# Patient Record
Sex: FEMALE | Race: WHITE | Employment: FULL TIME | URBAN - METROPOLITAN AREA
[De-identification: names, ages, dates, MRNs, and addresses within clinical notes are randomized per-mention and may not be internally consistent; named-entity substitution may affect disease eponyms.]

---

## 2019-12-24 ENCOUNTER — HOSPITAL ENCOUNTER (OUTPATIENT)
Dept: PREADMISSION TESTING | Age: 54
Discharge: HOME | DRG: 470 | End: 2019-12-28
Payer: COMMERCIAL

## 2019-12-24 VITALS
TEMPERATURE: 98.1 F | WEIGHT: 258.6 LBS | RESPIRATION RATE: 16 BRPM | BODY MASS INDEX: 44.15 KG/M2 | SYSTOLIC BLOOD PRESSURE: 161 MMHG | OXYGEN SATURATION: 98 % | HEART RATE: 68 BPM | HEIGHT: 64 IN | DIASTOLIC BLOOD PRESSURE: 69 MMHG

## 2019-12-24 LAB
ABO/RH: NORMAL
ANTIBODY SCREEN: NORMAL

## 2019-12-24 PROCEDURE — 86850 RBC ANTIBODY SCREEN: CPT

## 2019-12-24 PROCEDURE — 86901 BLOOD TYPING SEROLOGIC RH(D): CPT

## 2019-12-24 PROCEDURE — 82985 ASSAY OF GLYCATED PROTEIN: CPT

## 2019-12-24 PROCEDURE — 86900 BLOOD TYPING SEROLOGIC ABO: CPT

## 2019-12-24 RX ORDER — SODIUM CHLORIDE 0.9 % (FLUSH) 0.9 %
10 SYRINGE (ML) INJECTION PRN
Status: CANCELLED | OUTPATIENT
Start: 2019-12-30

## 2019-12-24 RX ORDER — CARVEDILOL 6.25 MG/1
6.25 TABLET ORAL 2 TIMES DAILY
COMMUNITY
Start: 2018-02-01

## 2019-12-24 RX ORDER — LISINOPRIL 5 MG/1
2.5 TABLET ORAL DAILY
COMMUNITY
Start: 2018-02-01

## 2019-12-24 RX ORDER — LIDOCAINE HYDROCHLORIDE 10 MG/ML
1 INJECTION, SOLUTION EPIDURAL; INFILTRATION; INTRACAUDAL; PERINEURAL
Status: CANCELLED | OUTPATIENT
Start: 2019-12-30 | End: 2019-12-30

## 2019-12-24 RX ORDER — SPIRONOLACTONE 25 MG/1
25 TABLET ORAL DAILY
COMMUNITY

## 2019-12-24 RX ORDER — VITAMIN E (DL,TOCOPHERYL ACET) 180 MG
500 CAPSULE ORAL DAILY
COMMUNITY

## 2019-12-24 RX ORDER — SERTRALINE HYDROCHLORIDE 100 MG/1
100 TABLET, FILM COATED ORAL DAILY
COMMUNITY

## 2019-12-24 RX ORDER — FUROSEMIDE 40 MG/1
40 TABLET ORAL DAILY
COMMUNITY
Start: 2018-07-22

## 2019-12-24 RX ORDER — SODIUM CHLORIDE, SODIUM LACTATE, POTASSIUM CHLORIDE, CALCIUM CHLORIDE 600; 310; 30; 20 MG/100ML; MG/100ML; MG/100ML; MG/100ML
INJECTION, SOLUTION INTRAVENOUS CONTINUOUS
Status: CANCELLED | OUTPATIENT
Start: 2019-12-30

## 2019-12-24 RX ORDER — SODIUM CHLORIDE 0.9 % (FLUSH) 0.9 %
10 SYRINGE (ML) INJECTION EVERY 12 HOURS SCHEDULED
Status: CANCELLED | OUTPATIENT
Start: 2019-12-30

## 2019-12-24 RX ORDER — POTASSIUM CHLORIDE 20 MEQ/1
40 TABLET, EXTENDED RELEASE ORAL DAILY
COMMUNITY

## 2019-12-27 LAB — FRUCTOSAMINE: 219 UMOL/L (ref 170–285)

## 2019-12-30 ENCOUNTER — HOSPITAL ENCOUNTER (OUTPATIENT)
Dept: GENERAL RADIOLOGY | Age: 54
Setting detail: SURGERY ADMIT
Discharge: HOME | DRG: 470 | End: 2020-01-01
Attending: ORTHOPAEDIC SURGERY
Payer: COMMERCIAL

## 2019-12-30 ENCOUNTER — ANESTHESIA (OUTPATIENT)
Dept: OPERATING ROOM | Age: 54
DRG: 470 | End: 2019-12-30
Payer: COMMERCIAL

## 2019-12-30 ENCOUNTER — APPOINTMENT (OUTPATIENT)
Dept: GENERAL RADIOLOGY | Age: 54
DRG: 470 | End: 2019-12-30
Attending: ORTHOPAEDIC SURGERY
Payer: COMMERCIAL

## 2019-12-30 ENCOUNTER — ANESTHESIA EVENT (OUTPATIENT)
Dept: OPERATING ROOM | Age: 54
DRG: 470 | End: 2019-12-30
Payer: COMMERCIAL

## 2019-12-30 ENCOUNTER — HOSPITAL ENCOUNTER (INPATIENT)
Age: 54
LOS: 2 days | Discharge: HOME HEALTH | DRG: 470 | End: 2020-01-01
Attending: ORTHOPAEDIC SURGERY | Admitting: ORTHOPAEDIC SURGERY
Payer: COMMERCIAL

## 2019-12-30 VITALS
OXYGEN SATURATION: 99 % | DIASTOLIC BLOOD PRESSURE: 50 MMHG | TEMPERATURE: 98.6 F | SYSTOLIC BLOOD PRESSURE: 83 MMHG | RESPIRATION RATE: 15 BRPM

## 2019-12-30 PROBLEM — M16.12 OSTEOARTHRITIS OF LEFT HIP: Status: ACTIVE | Noted: 2019-12-30

## 2019-12-30 PROCEDURE — 3700000001 HC ADD 15 MINUTES (ANESTHESIA): Performed by: ORTHOPAEDIC SURGERY

## 2019-12-30 PROCEDURE — 6360000002 HC RX W HCPCS: Performed by: ANESTHESIOLOGY

## 2019-12-30 PROCEDURE — C1776 JOINT DEVICE (IMPLANTABLE): HCPCS | Performed by: ORTHOPAEDIC SURGERY

## 2019-12-30 PROCEDURE — 2580000003 HC RX 258: Performed by: ORTHOPAEDIC SURGERY

## 2019-12-30 PROCEDURE — 7100000000 HC PACU RECOVERY - FIRST 15 MIN: Performed by: ORTHOPAEDIC SURGERY

## 2019-12-30 PROCEDURE — 3700000000 HC ANESTHESIA ATTENDED CARE: Performed by: ORTHOPAEDIC SURGERY

## 2019-12-30 PROCEDURE — 64450 NJX AA&/STRD OTHER PN/BRANCH: CPT | Performed by: ANESTHESIOLOGY

## 2019-12-30 PROCEDURE — 0SRB0JZ REPLACEMENT OF LEFT HIP JOINT WITH SYNTHETIC SUBSTITUTE, OPEN APPROACH: ICD-10-PCS | Performed by: ORTHOPAEDIC SURGERY

## 2019-12-30 PROCEDURE — 6370000000 HC RX 637 (ALT 250 FOR IP): Performed by: ORTHOPAEDIC SURGERY

## 2019-12-30 PROCEDURE — 2709999900 HC NON-CHARGEABLE SUPPLY: Performed by: ORTHOPAEDIC SURGERY

## 2019-12-30 PROCEDURE — 2500000003 HC RX 250 WO HCPCS: Performed by: NURSE ANESTHETIST, CERTIFIED REGISTERED

## 2019-12-30 PROCEDURE — 1210000000 HC MED SURG R&B

## 2019-12-30 PROCEDURE — 6360000002 HC RX W HCPCS: Performed by: ORTHOPAEDIC SURGERY

## 2019-12-30 PROCEDURE — C1729 CATH, DRAINAGE: HCPCS | Performed by: ORTHOPAEDIC SURGERY

## 2019-12-30 PROCEDURE — 7100000001 HC PACU RECOVERY - ADDTL 15 MIN: Performed by: ORTHOPAEDIC SURGERY

## 2019-12-30 PROCEDURE — 3600000014 HC SURGERY LEVEL 4 ADDTL 15MIN: Performed by: ORTHOPAEDIC SURGERY

## 2019-12-30 PROCEDURE — 3600000004 HC SURGERY LEVEL 4 BASE: Performed by: ORTHOPAEDIC SURGERY

## 2019-12-30 PROCEDURE — 73502 X-RAY EXAM HIP UNI 2-3 VIEWS: CPT

## 2019-12-30 PROCEDURE — 6360000002 HC RX W HCPCS: Performed by: NURSE ANESTHETIST, CERTIFIED REGISTERED

## 2019-12-30 PROCEDURE — 2580000003 HC RX 258: Performed by: NURSE ANESTHETIST, CERTIFIED REGISTERED

## 2019-12-30 PROCEDURE — 72170 X-RAY EXAM OF PELVIS: CPT

## 2019-12-30 PROCEDURE — 2500000003 HC RX 250 WO HCPCS: Performed by: NURSE PRACTITIONER

## 2019-12-30 DEVICE — CUP ACET DIA52MM HIP GRIPTION PRI CEMENTLESS FIX SECT SER: Type: IMPLANTABLE DEVICE | Site: HIP | Status: FUNCTIONAL

## 2019-12-30 DEVICE — IMPLANTABLE DEVICE: Type: IMPLANTABLE DEVICE | Site: HIP | Status: FUNCTIONAL

## 2019-12-30 DEVICE — SCREW BNE L25MM DIA6.5MM CANC HIP S STL GRIPTION FULL THRD: Type: IMPLANTABLE DEVICE | Site: HIP | Status: FUNCTIONAL

## 2019-12-30 DEVICE — LINER ACET OD52MM ID36MM HIP ALTRX PINN: Type: IMPLANTABLE DEVICE | Site: HIP | Status: FUNCTIONAL

## 2019-12-30 RX ORDER — POTASSIUM CHLORIDE 20 MEQ/1
40 TABLET, EXTENDED RELEASE ORAL DAILY
Status: DISCONTINUED | OUTPATIENT
Start: 2019-12-30 | End: 2020-01-01 | Stop reason: HOSPADM

## 2019-12-30 RX ORDER — METOCLOPRAMIDE HYDROCHLORIDE 5 MG/ML
10 INJECTION INTRAMUSCULAR; INTRAVENOUS
Status: DISCONTINUED | OUTPATIENT
Start: 2019-12-30 | End: 2019-12-30 | Stop reason: HOSPADM

## 2019-12-30 RX ORDER — ONDANSETRON 2 MG/ML
4 INJECTION INTRAMUSCULAR; INTRAVENOUS EVERY 6 HOURS PRN
Status: DISCONTINUED | OUTPATIENT
Start: 2019-12-30 | End: 2020-01-01 | Stop reason: HOSPADM

## 2019-12-30 RX ORDER — SENNA AND DOCUSATE SODIUM 50; 8.6 MG/1; MG/1
1 TABLET, FILM COATED ORAL 2 TIMES DAILY
Status: DISCONTINUED | OUTPATIENT
Start: 2019-12-30 | End: 2020-01-01 | Stop reason: HOSPADM

## 2019-12-30 RX ORDER — EPINEPHRINE 1 MG/ML
INJECTION, SOLUTION, CONCENTRATE INTRAVENOUS PRN
Status: DISCONTINUED | OUTPATIENT
Start: 2019-12-30 | End: 2019-12-30 | Stop reason: SDUPTHER

## 2019-12-30 RX ORDER — SODIUM CHLORIDE 9 MG/ML
INJECTION, SOLUTION INTRAVENOUS CONTINUOUS
Status: DISCONTINUED | OUTPATIENT
Start: 2019-12-30 | End: 2020-01-01 | Stop reason: HOSPADM

## 2019-12-30 RX ORDER — ROPIVACAINE HYDROCHLORIDE 5 MG/ML
INJECTION, SOLUTION EPIDURAL; INFILTRATION; PERINEURAL PRN
Status: DISCONTINUED | OUTPATIENT
Start: 2019-12-30 | End: 2019-12-30 | Stop reason: SDUPTHER

## 2019-12-30 RX ORDER — SODIUM CHLORIDE 0.9 % (FLUSH) 0.9 %
10 SYRINGE (ML) INJECTION EVERY 12 HOURS SCHEDULED
Status: DISCONTINUED | OUTPATIENT
Start: 2019-12-30 | End: 2019-12-30 | Stop reason: HOSPADM

## 2019-12-30 RX ORDER — SODIUM CHLORIDE, SODIUM LACTATE, POTASSIUM CHLORIDE, CALCIUM CHLORIDE 600; 310; 30; 20 MG/100ML; MG/100ML; MG/100ML; MG/100ML
INJECTION, SOLUTION INTRAVENOUS CONTINUOUS
Status: DISCONTINUED | OUTPATIENT
Start: 2019-12-30 | End: 2019-12-30

## 2019-12-30 RX ORDER — SODIUM CHLORIDE, SODIUM LACTATE, POTASSIUM CHLORIDE, CALCIUM CHLORIDE 600; 310; 30; 20 MG/100ML; MG/100ML; MG/100ML; MG/100ML
INJECTION, SOLUTION INTRAVENOUS CONTINUOUS PRN
Status: DISCONTINUED | OUTPATIENT
Start: 2019-12-30 | End: 2019-12-30 | Stop reason: SDUPTHER

## 2019-12-30 RX ORDER — SODIUM CHLORIDE 0.9 % (FLUSH) 0.9 %
10 SYRINGE (ML) INJECTION PRN
Status: DISCONTINUED | OUTPATIENT
Start: 2019-12-30 | End: 2019-12-30 | Stop reason: HOSPADM

## 2019-12-30 RX ORDER — FENTANYL CITRATE 50 UG/ML
50 INJECTION, SOLUTION INTRAMUSCULAR; INTRAVENOUS EVERY 10 MIN PRN
Status: DISCONTINUED | OUTPATIENT
Start: 2019-12-30 | End: 2019-12-30 | Stop reason: HOSPADM

## 2019-12-30 RX ORDER — TRANEXAMIC ACID 650 1/1
1950 TABLET ORAL ONCE
Status: COMPLETED | OUTPATIENT
Start: 2019-12-30 | End: 2019-12-30

## 2019-12-30 RX ORDER — ACETAMINOPHEN 500 MG
1000 TABLET ORAL EVERY 8 HOURS SCHEDULED
Status: DISCONTINUED | OUTPATIENT
Start: 2019-12-30 | End: 2020-01-01 | Stop reason: HOSPADM

## 2019-12-30 RX ORDER — SODIUM CHLORIDE 0.9 % (FLUSH) 0.9 %
10 SYRINGE (ML) INJECTION EVERY 12 HOURS SCHEDULED
Status: DISCONTINUED | OUTPATIENT
Start: 2019-12-30 | End: 2020-01-01 | Stop reason: HOSPADM

## 2019-12-30 RX ORDER — SERTRALINE HYDROCHLORIDE 100 MG/1
100 TABLET, FILM COATED ORAL DAILY
Status: DISCONTINUED | OUTPATIENT
Start: 2019-12-30 | End: 2020-01-01 | Stop reason: HOSPADM

## 2019-12-30 RX ORDER — CEFAZOLIN SODIUM 2 G/50ML
2 SOLUTION INTRAVENOUS EVERY 8 HOURS
Status: DISCONTINUED | OUTPATIENT
Start: 2019-12-30 | End: 2019-12-30

## 2019-12-30 RX ORDER — LIDOCAINE HYDROCHLORIDE 20 MG/ML
INJECTION, SOLUTION INTRAVENOUS PRN
Status: DISCONTINUED | OUTPATIENT
Start: 2019-12-30 | End: 2019-12-30 | Stop reason: SDUPTHER

## 2019-12-30 RX ORDER — MIDAZOLAM HYDROCHLORIDE 1 MG/ML
INJECTION INTRAMUSCULAR; INTRAVENOUS PRN
Status: DISCONTINUED | OUTPATIENT
Start: 2019-12-30 | End: 2019-12-30 | Stop reason: SDUPTHER

## 2019-12-30 RX ORDER — BUPIVACAINE HYDROCHLORIDE 7.5 MG/ML
INJECTION, SOLUTION INTRASPINAL PRN
Status: DISCONTINUED | OUTPATIENT
Start: 2019-12-30 | End: 2019-12-30 | Stop reason: SDUPTHER

## 2019-12-30 RX ORDER — DIPHENHYDRAMINE HYDROCHLORIDE 50 MG/ML
12.5 INJECTION INTRAMUSCULAR; INTRAVENOUS
Status: DISCONTINUED | OUTPATIENT
Start: 2019-12-30 | End: 2019-12-30 | Stop reason: HOSPADM

## 2019-12-30 RX ORDER — SODIUM CHLORIDE 0.9 % (FLUSH) 0.9 %
10 SYRINGE (ML) INJECTION PRN
Status: DISCONTINUED | OUTPATIENT
Start: 2019-12-30 | End: 2020-01-01 | Stop reason: HOSPADM

## 2019-12-30 RX ORDER — MAGNESIUM HYDROXIDE 1200 MG/15ML
LIQUID ORAL CONTINUOUS PRN
Status: COMPLETED | OUTPATIENT
Start: 2019-12-30 | End: 2019-12-30

## 2019-12-30 RX ORDER — HYDROCODONE BITARTRATE AND ACETAMINOPHEN 5; 325 MG/1; MG/1
1 TABLET ORAL EVERY 6 HOURS PRN
Status: DISCONTINUED | OUTPATIENT
Start: 2019-12-30 | End: 2020-01-01 | Stop reason: HOSPADM

## 2019-12-30 RX ORDER — TRAMADOL HYDROCHLORIDE 50 MG/1
50 TABLET ORAL EVERY 6 HOURS SCHEDULED
Status: DISCONTINUED | OUTPATIENT
Start: 2019-12-30 | End: 2020-01-01 | Stop reason: HOSPADM

## 2019-12-30 RX ORDER — ONDANSETRON 2 MG/ML
4 INJECTION INTRAMUSCULAR; INTRAVENOUS
Status: COMPLETED | OUTPATIENT
Start: 2019-12-30 | End: 2019-12-30

## 2019-12-30 RX ORDER — SPIRONOLACTONE 25 MG/1
25 TABLET ORAL DAILY
Status: DISCONTINUED | OUTPATIENT
Start: 2019-12-30 | End: 2020-01-01 | Stop reason: HOSPADM

## 2019-12-30 RX ORDER — ONDANSETRON 2 MG/ML
INJECTION INTRAMUSCULAR; INTRAVENOUS PRN
Status: DISCONTINUED | OUTPATIENT
Start: 2019-12-30 | End: 2019-12-30 | Stop reason: SDUPTHER

## 2019-12-30 RX ORDER — LIDOCAINE HYDROCHLORIDE 10 MG/ML
1 INJECTION, SOLUTION EPIDURAL; INFILTRATION; INTRACAUDAL; PERINEURAL
Status: COMPLETED | OUTPATIENT
Start: 2019-12-30 | End: 2019-12-30

## 2019-12-30 RX ORDER — LISINOPRIL 5 MG/1
2.5 TABLET ORAL DAILY
Status: DISCONTINUED | OUTPATIENT
Start: 2019-12-30 | End: 2020-01-01 | Stop reason: HOSPADM

## 2019-12-30 RX ORDER — TRANEXAMIC ACID 650 1/1
1300 TABLET ORAL ONCE
Status: DISCONTINUED | OUTPATIENT
Start: 2019-12-30 | End: 2019-12-30

## 2019-12-30 RX ORDER — PROPOFOL 10 MG/ML
INJECTION, EMULSION INTRAVENOUS CONTINUOUS PRN
Status: DISCONTINUED | OUTPATIENT
Start: 2019-12-30 | End: 2019-12-30 | Stop reason: SDUPTHER

## 2019-12-30 RX ORDER — SODIUM CHLORIDE, SODIUM LACTATE, POTASSIUM CHLORIDE, CALCIUM CHLORIDE 600; 310; 30; 20 MG/100ML; MG/100ML; MG/100ML; MG/100ML
INJECTION, SOLUTION INTRAVENOUS ONCE
Status: DISCONTINUED | OUTPATIENT
Start: 2019-12-30 | End: 2020-01-01 | Stop reason: HOSPADM

## 2019-12-30 RX ORDER — PHENYLEPHRINE HYDROCHLORIDE 10 MG/ML
INJECTION INTRAVENOUS PRN
Status: DISCONTINUED | OUTPATIENT
Start: 2019-12-30 | End: 2019-12-30 | Stop reason: SDUPTHER

## 2019-12-30 RX ORDER — DOCUSATE SODIUM 100 MG/1
100 CAPSULE, LIQUID FILLED ORAL 2 TIMES DAILY
Status: DISCONTINUED | OUTPATIENT
Start: 2019-12-30 | End: 2020-01-01 | Stop reason: HOSPADM

## 2019-12-30 RX ORDER — MEPERIDINE HYDROCHLORIDE 25 MG/ML
12.5 INJECTION INTRAMUSCULAR; INTRAVENOUS; SUBCUTANEOUS EVERY 5 MIN PRN
Status: DISCONTINUED | OUTPATIENT
Start: 2019-12-30 | End: 2019-12-30 | Stop reason: HOSPADM

## 2019-12-30 RX ORDER — MAGNESIUM HYDROXIDE 1200 MG/15ML
LIQUID ORAL PRN
Status: DISCONTINUED | OUTPATIENT
Start: 2019-12-30 | End: 2019-12-30 | Stop reason: ALTCHOICE

## 2019-12-30 RX ORDER — ASPIRIN 81 MG/1
81 TABLET ORAL 2 TIMES DAILY
Status: DISCONTINUED | OUTPATIENT
Start: 2019-12-30 | End: 2020-01-01 | Stop reason: HOSPADM

## 2019-12-30 RX ORDER — CARVEDILOL 6.25 MG/1
6.25 TABLET ORAL 2 TIMES DAILY
Status: DISCONTINUED | OUTPATIENT
Start: 2019-12-30 | End: 2020-01-01 | Stop reason: HOSPADM

## 2019-12-30 RX ORDER — FUROSEMIDE 40 MG/1
40 TABLET ORAL DAILY
Status: DISCONTINUED | OUTPATIENT
Start: 2019-12-30 | End: 2020-01-01 | Stop reason: HOSPADM

## 2019-12-30 RX ORDER — LANOLIN ALCOHOL/MO/W.PET/CERES
400 CREAM (GRAM) TOPICAL DAILY
Status: DISCONTINUED | OUTPATIENT
Start: 2019-12-30 | End: 2020-01-01 | Stop reason: HOSPADM

## 2019-12-30 RX ORDER — TRANEXAMIC ACID 650 1/1
1950 TABLET ORAL ONCE
Status: COMPLETED | OUTPATIENT
Start: 2019-12-31 | End: 2019-12-31

## 2019-12-30 RX ADMIN — MIDAZOLAM HYDROCHLORIDE 0.5 MG: 2 INJECTION, SOLUTION INTRAMUSCULAR; INTRAVENOUS at 13:55

## 2019-12-30 RX ADMIN — HYDROCODONE BITARTRATE AND ACETAMINOPHEN 1 TABLET: 5; 325 TABLET ORAL at 23:22

## 2019-12-30 RX ADMIN — BUPIVACAINE HYDROCHLORIDE 12 MG: 7.5 INJECTION, SOLUTION INTRASPINAL at 11:42

## 2019-12-30 RX ADMIN — SODIUM CHLORIDE: 9 INJECTION, SOLUTION INTRAVENOUS at 22:49

## 2019-12-30 RX ADMIN — LIDOCAINE HYDROCHLORIDE 0.1 ML: 10 INJECTION, SOLUTION EPIDURAL; INFILTRATION; INTRACAUDAL; PERINEURAL at 10:11

## 2019-12-30 RX ADMIN — ONDANSETRON 4 MG: 2 INJECTION INTRAMUSCULAR; INTRAVENOUS at 13:59

## 2019-12-30 RX ADMIN — VANCOMYCIN HYDROCHLORIDE 2 G: 1 INJECTION, POWDER, LYOPHILIZED, FOR SOLUTION INTRAVENOUS at 11:50

## 2019-12-30 RX ADMIN — TRAMADOL HYDROCHLORIDE 50 MG: 50 TABLET, FILM COATED ORAL at 20:29

## 2019-12-30 RX ADMIN — TRANEXAMIC ACID 1950 MG: 650 TABLET ORAL at 22:36

## 2019-12-30 RX ADMIN — SODIUM CHLORIDE, POTASSIUM CHLORIDE, SODIUM LACTATE AND CALCIUM CHLORIDE: 600; 310; 30; 20 INJECTION, SOLUTION INTRAVENOUS at 15:02

## 2019-12-30 RX ADMIN — FENTANYL CITRATE 50 MCG: 50 INJECTION, SOLUTION INTRAMUSCULAR; INTRAVENOUS at 16:05

## 2019-12-30 RX ADMIN — TRANEXAMIC ACID 1950 MG: 650 TABLET ORAL at 10:17

## 2019-12-30 RX ADMIN — ASPIRIN 81 MG: 81 TABLET, COATED ORAL at 22:36

## 2019-12-30 RX ADMIN — SODIUM CHLORIDE, POTASSIUM CHLORIDE, SODIUM LACTATE AND CALCIUM CHLORIDE: 600; 310; 30; 20 INJECTION, SOLUTION INTRAVENOUS at 12:22

## 2019-12-30 RX ADMIN — FENTANYL CITRATE 50 MCG: 50 INJECTION, SOLUTION INTRAMUSCULAR; INTRAVENOUS at 16:30

## 2019-12-30 RX ADMIN — LIDOCAINE HYDROCHLORIDE 50 MG: 20 INJECTION, SOLUTION INTRAVENOUS at 11:45

## 2019-12-30 RX ADMIN — MIDAZOLAM HYDROCHLORIDE 1 MG: 2 INJECTION, SOLUTION INTRAMUSCULAR; INTRAVENOUS at 12:18

## 2019-12-30 RX ADMIN — PHENYLEPHRINE HYDROCHLORIDE 100 MCG: 10 INJECTION INTRAVENOUS at 12:29

## 2019-12-30 RX ADMIN — FENTANYL CITRATE 50 MCG: 50 INJECTION, SOLUTION INTRAMUSCULAR; INTRAVENOUS at 16:20

## 2019-12-30 RX ADMIN — MIDAZOLAM HYDROCHLORIDE 0.5 MG: 2 INJECTION, SOLUTION INTRAMUSCULAR; INTRAVENOUS at 13:13

## 2019-12-30 RX ADMIN — MIDAZOLAM HYDROCHLORIDE 4 MG: 2 INJECTION, SOLUTION INTRAMUSCULAR; INTRAVENOUS at 10:55

## 2019-12-30 RX ADMIN — ACETAMINOPHEN 1000 MG: 500 TABLET ORAL at 22:37

## 2019-12-30 RX ADMIN — SERTRALINE 100 MG: 100 TABLET, FILM COATED ORAL at 22:44

## 2019-12-30 RX ADMIN — EPINEPHRINE 100 MCG: 1 INJECTION, SOLUTION INTRAMUSCULAR; SUBCUTANEOUS at 11:00

## 2019-12-30 RX ADMIN — Medication 400 MG: at 22:43

## 2019-12-30 RX ADMIN — ROPIVACAINE HYDROCHLORIDE 20 ML: 5 INJECTION, SOLUTION EPIDURAL; INFILTRATION; PERINEURAL at 11:00

## 2019-12-30 RX ADMIN — SPIRONOLACTONE 25 MG: 25 TABLET, FILM COATED ORAL at 22:51

## 2019-12-30 RX ADMIN — ONDANSETRON 4 MG: 2 INJECTION INTRAMUSCULAR; INTRAVENOUS at 16:10

## 2019-12-30 RX ADMIN — PHENYLEPHRINE HYDROCHLORIDE 100 MCG: 10 INJECTION INTRAVENOUS at 12:13

## 2019-12-30 RX ADMIN — PROPOFOL 100 MCG/KG/MIN: 10 INJECTION, EMULSION INTRAVENOUS at 11:50

## 2019-12-30 ASSESSMENT — PULMONARY FUNCTION TESTS
PIF_VALUE: 0

## 2019-12-30 ASSESSMENT — PAIN DESCRIPTION - LOCATION: LOCATION: HIP

## 2019-12-30 ASSESSMENT — PAIN SCALES - GENERAL
PAINLEVEL_OUTOF10: 6
PAINLEVEL_OUTOF10: 5
PAINLEVEL_OUTOF10: 8
PAINLEVEL_OUTOF10: 7
PAINLEVEL_OUTOF10: 3
PAINLEVEL_OUTOF10: 5
PAINLEVEL_OUTOF10: 6

## 2019-12-30 ASSESSMENT — PAIN DESCRIPTION - ORIENTATION: ORIENTATION: LEFT

## 2019-12-30 ASSESSMENT — PAIN DESCRIPTION - PAIN TYPE: TYPE: SURGICAL PAIN

## 2019-12-30 ASSESSMENT — LIFESTYLE VARIABLES: SMOKING_STATUS: 1

## 2019-12-30 NOTE — FLOWSHEET NOTE
Spoke with Elías Golden RN in Recovery to verify surgery end time, she states it was [email or web address], per pharmacy they need to time TXA correctly.

## 2019-12-30 NOTE — ANESTHESIA PROCEDURE NOTES
Spinal Block    Patient location during procedure: OR  Start time: 12/30/2019 11:39 AM  End time: 12/30/2019 11:43 AM  Reason for block: primary anesthetic  Staffing  Resident/CRNA: RIYA Kee CRNA  Performed: resident/CRNA   Preanesthetic Checklist  Completed: patient identified, site marked, surgical consent, pre-op evaluation, timeout performed, IV checked, risks and benefits discussed, monitors and equipment checked, anesthesia consent given, oxygen available and patient being monitored  Spinal Block  Patient position: sitting  Prep: ChloraPrep, site prepped and draped and x3  Patient monitoring: cardiac monitor, continuous pulse ox, continuous capnometry and frequent blood pressure checks  Approach: midline  Location: L3/L4  Provider prep: mask and sterile gloves  Local infiltration: lidocaine  Agent: bupivacaine  Dose: 1.6  Dose: 1.6  Needle  Needle type: Radha   Needle gauge: 22 G  Needle length: 3.5 in  Kit: benavides lima  Lot number: [id number]  Expiration date: 10/31/2020  Assessment  Sensory level: T6  Swirl obtained: Yes  CSF: clear  Attempts: 2  Hemodynamics: stable  Additional Notes  Attempt x1 with #24 pecan, needle buckled,attempt x1 22 radha, easy placement.

## 2019-12-30 NOTE — FLOWSHEET NOTE
Spoke to Dr Lisa Andersen in regards to patient left hip dressing drainage and patient stating left hip is burning, he states he will notify Dr oMnica Pepe of update

## 2019-12-31 LAB
ANION GAP SERPL CALCULATED.3IONS-SCNC: 12 MEQ/L (ref 9–15)
BUN BLDV-MCNC: 23 MG/DL (ref 6–20)
CALCIUM SERPL-MCNC: 8.2 MG/DL (ref 8.5–9.9)
CHLORIDE BLD-SCNC: 103 MEQ/L (ref 95–107)
CO2: 21 MEQ/L (ref 20–31)
CREAT SERPL-MCNC: 0.79 MG/DL (ref 0.5–0.9)
GFR AFRICAN AMERICAN: >60
GFR NON-AFRICAN AMERICAN: >60
GLUCOSE BLD-MCNC: 123 MG/DL (ref 70–99)
HCT VFR BLD CALC: 29.6 % (ref 37–47)
HEMOGLOBIN: 9.7 G/DL (ref 12–16)
MCH RBC QN AUTO: 29 PG (ref 27–31.3)
MCHC RBC AUTO-ENTMCNC: 32.6 % (ref 33–37)
MCV RBC AUTO: 89 FL (ref 82–100)
PDW BLD-RTO: 16 % (ref 11.5–14.5)
PLATELET # BLD: 171 K/UL (ref 130–400)
POTASSIUM REFLEX MAGNESIUM: 4.6 MEQ/L (ref 3.4–4.9)
RBC # BLD: 3.33 M/UL (ref 4.2–5.4)
SODIUM BLD-SCNC: 136 MEQ/L (ref 135–144)
WBC # BLD: 12.6 K/UL (ref 4.8–10.8)

## 2019-12-31 PROCEDURE — 6370000000 HC RX 637 (ALT 250 FOR IP): Performed by: ORTHOPAEDIC SURGERY

## 2019-12-31 PROCEDURE — 80048 BASIC METABOLIC PNL TOTAL CA: CPT

## 2019-12-31 PROCEDURE — 1210000000 HC MED SURG R&B

## 2019-12-31 PROCEDURE — 85027 COMPLETE CBC AUTOMATED: CPT

## 2019-12-31 PROCEDURE — 97535 SELF CARE MNGMENT TRAINING: CPT

## 2019-12-31 PROCEDURE — 6360000002 HC RX W HCPCS: Performed by: ORTHOPAEDIC SURGERY

## 2019-12-31 PROCEDURE — 2580000003 HC RX 258: Performed by: ORTHOPAEDIC SURGERY

## 2019-12-31 PROCEDURE — 97116 GAIT TRAINING THERAPY: CPT

## 2019-12-31 PROCEDURE — 97162 PT EVAL MOD COMPLEX 30 MIN: CPT

## 2019-12-31 PROCEDURE — 97166 OT EVAL MOD COMPLEX 45 MIN: CPT

## 2019-12-31 PROCEDURE — 94150 VITAL CAPACITY TEST: CPT

## 2019-12-31 PROCEDURE — 36415 COLL VENOUS BLD VENIPUNCTURE: CPT

## 2019-12-31 RX ORDER — HYDROCODONE BITARTRATE AND ACETAMINOPHEN 5; 325 MG/1; MG/1
1 TABLET ORAL EVERY 6 HOURS PRN
Qty: 20 TABLET | Refills: 0 | Status: SHIPPED | OUTPATIENT
Start: 2019-12-31 | End: 2020-01-05

## 2019-12-31 RX ORDER — ASPIRIN 81 MG/1
81 TABLET ORAL DAILY
Qty: 60 TABLET | Refills: 0 | Status: SHIPPED | OUTPATIENT
Start: 2019-12-31

## 2019-12-31 RX ADMIN — ASPIRIN 81 MG: 81 TABLET, COATED ORAL at 22:02

## 2019-12-31 RX ADMIN — ASPIRIN 81 MG: 81 TABLET, COATED ORAL at 08:04

## 2019-12-31 RX ADMIN — RIVAROXABAN 15 MG: 15 TABLET, FILM COATED ORAL at 17:20

## 2019-12-31 RX ADMIN — HYDROCODONE BITARTRATE AND ACETAMINOPHEN 1 TABLET: 5; 325 TABLET ORAL at 20:30

## 2019-12-31 RX ADMIN — ACETAMINOPHEN 1000 MG: 500 TABLET ORAL at 06:38

## 2019-12-31 RX ADMIN — VANCOMYCIN HYDROCHLORIDE 1000 MG: 1 INJECTION, POWDER, LYOPHILIZED, FOR SOLUTION INTRAVENOUS at 12:50

## 2019-12-31 RX ADMIN — TRAMADOL HYDROCHLORIDE 50 MG: 50 TABLET, FILM COATED ORAL at 02:11

## 2019-12-31 RX ADMIN — HYDROCODONE BITARTRATE AND ACETAMINOPHEN 1 TABLET: 5; 325 TABLET ORAL at 14:04

## 2019-12-31 RX ADMIN — TRANEXAMIC ACID 1950 MG: 650 TABLET ORAL at 06:39

## 2019-12-31 RX ADMIN — SERTRALINE 100 MG: 100 TABLET, FILM COATED ORAL at 08:04

## 2019-12-31 RX ADMIN — HYDROCODONE BITARTRATE AND ACETAMINOPHEN 1 TABLET: 5; 325 TABLET ORAL at 08:04

## 2019-12-31 RX ADMIN — TRAMADOL HYDROCHLORIDE 50 MG: 50 TABLET, FILM COATED ORAL at 20:30

## 2019-12-31 RX ADMIN — ACETAMINOPHEN 500 MG: 500 TABLET ORAL at 22:03

## 2019-12-31 RX ADMIN — CARVEDILOL 6.25 MG: 6.25 TABLET, FILM COATED ORAL at 22:02

## 2019-12-31 RX ADMIN — ACETAMINOPHEN 1000 MG: 500 TABLET ORAL at 14:04

## 2019-12-31 RX ADMIN — FUROSEMIDE 40 MG: 40 TABLET ORAL at 17:20

## 2019-12-31 RX ADMIN — TRAMADOL HYDROCHLORIDE 50 MG: 50 TABLET, FILM COATED ORAL at 14:04

## 2019-12-31 RX ADMIN — SPIRONOLACTONE 25 MG: 25 TABLET, FILM COATED ORAL at 22:02

## 2019-12-31 RX ADMIN — Medication 10 ML: at 22:04

## 2019-12-31 RX ADMIN — Medication 400 MG: at 08:05

## 2019-12-31 RX ADMIN — VANCOMYCIN HYDROCHLORIDE 1000 MG: 1 INJECTION, POWDER, LYOPHILIZED, FOR SOLUTION INTRAVENOUS at 00:09

## 2019-12-31 RX ADMIN — TRAMADOL HYDROCHLORIDE 50 MG: 50 TABLET, FILM COATED ORAL at 08:04

## 2019-12-31 RX ADMIN — Medication 10 ML: at 13:00

## 2019-12-31 ASSESSMENT — PAIN DESCRIPTION - PROGRESSION
CLINICAL_PROGRESSION: NOT CHANGED

## 2019-12-31 ASSESSMENT — PAIN DESCRIPTION - DESCRIPTORS
DESCRIPTORS: ACHING;SORE
DESCRIPTORS: ACHING;SORE

## 2019-12-31 ASSESSMENT — PAIN DESCRIPTION - ORIENTATION
ORIENTATION: LEFT

## 2019-12-31 ASSESSMENT — PAIN DESCRIPTION - LOCATION
LOCATION: HIP

## 2019-12-31 ASSESSMENT — PAIN DESCRIPTION - PAIN TYPE
TYPE: SURGICAL PAIN;ACUTE PAIN
TYPE: SURGICAL PAIN

## 2019-12-31 ASSESSMENT — PAIN SCALES - GENERAL
PAINLEVEL_OUTOF10: 7
PAINLEVEL_OUTOF10: 5
PAINLEVEL_OUTOF10: 2
PAINLEVEL_OUTOF10: 3
PAINLEVEL_OUTOF10: 4
PAINLEVEL_OUTOF10: 0
PAINLEVEL_OUTOF10: 8
PAINLEVEL_OUTOF10: 8
PAINLEVEL_OUTOF10: 5
PAINLEVEL_OUTOF10: 2
PAINLEVEL_OUTOF10: 1
PAINLEVEL_OUTOF10: 8
PAINLEVEL_OUTOF10: 8

## 2019-12-31 ASSESSMENT — PAIN DESCRIPTION - ONSET: ONSET: ON-GOING

## 2019-12-31 ASSESSMENT — PAIN DESCRIPTION - FREQUENCY: FREQUENCY: CONTINUOUS

## 2019-12-31 NOTE — PROGRESS NOTES
Department of Internal Medicine  General Internal Medicine  Attending Progress Note      SUBJECTIVE:  Pt seen and examined. Ambulating and tolerating diet. No complaints    OBJECTIVE      Medications    Current Facility-Administered Medications: magnesium oxide (MAG-OX) tablet 400 mg, 400 mg, Oral, Daily  carvedilol (COREG) tablet 6.25 mg, 6.25 mg, Oral, BID  furosemide (LASIX) tablet 40 mg, 40 mg, Oral, Daily  lisinopril (PRINIVIL;ZESTRIL) tablet 2.5 mg, 2.5 mg, Oral, Daily  potassium chloride (KLOR-CON M) extended release tablet 40 mEq, 40 mEq, Oral, Daily  rivaroxaban (XARELTO) tablet 15 mg, 15 mg, Oral, Daily  sertraline (ZOLOFT) tablet 100 mg, 100 mg, Oral, Daily  spironolactone (ALDACTONE) tablet 25 mg, 25 mg, Oral, Daily  0.9 % sodium chloride infusion, , Intravenous, Continuous  sodium chloride flush 0.9 % injection 10 mL, 10 mL, Intravenous, 2 times per day  sodium chloride flush 0.9 % injection 10 mL, 10 mL, Intravenous, PRN  acetaminophen (TYLENOL) tablet 1,000 mg, 1,000 mg, Oral, 3 times per day  docusate sodium (COLACE) capsule 100 mg, 100 mg, Oral, BID  sennosides-docusate sodium (SENOKOT-S) 8.6-50 MG tablet 1 tablet, 1 tablet, Oral, BID  magnesium hydroxide (MILK OF MAGNESIA) 400 MG/5ML suspension 30 mL, 30 mL, Oral, Daily PRN  ondansetron (ZOFRAN) injection 4 mg, 4 mg, Intravenous, Q6H PRN  aspirin EC tablet 81 mg, 81 mg, Oral, BID  traMADol (ULTRAM) tablet 50 mg, 50 mg, Oral, 4 times per day  HYDROmorphone (DILAUDID) injection 0.5 mg, 0.5 mg, Intravenous, Q3H PRN  HYDROcodone-acetaminophen (NORCO) 5-325 MG per tablet 1 tablet, 1 tablet, Oral, Q6H PRN  lactated ringers infusion, , Intravenous, Once  Physical    VITALS:  BP (!) 102/51   Pulse 94   Temp 97.7 °F (36.5 °C) (Oral)   Resp 18   Ht 5' 4\" (1.626 m)   Wt 258 lb 9 oz (117.3 kg)   SpO2 92%   BMI 44.38 kg/m²   Constitutional: Awake and alert in no acute distress.  Sitting in chair comfortably  Head: Normocephalic, atraumatic  Eyes: EOMI, PERRLA  ENT: moist mucous membranes  Neck: neck supple, trachea midline  Lungs: Good inspiratory effort, CTABL, no wheeze, no rhonchi, no rales  Heart: RRR, normal S1 and S2, no murmurs  GI: Soft, non-distended, non tender, no guarding, no rebound, +BS  MSK: no edema noted  Pulses: 2+ pulses bilaterally  Skin: warm, dry, good turgor, without lesions or rashes  Neuro: Intact motor and sensory, no focal deficits  Psych: appropriate affect     Data    CBC:   Lab Results   Component Value Date    WBC 12.6 12/31/2019    RBC 3.33 12/31/2019    HGB 9.7 12/31/2019    HCT 29.6 12/31/2019    MCV 89.0 12/31/2019    MCH 29.0 12/31/2019    MCHC 32.6 12/31/2019    RDW 16.0 12/31/2019     12/31/2019     CMP:    Lab Results   Component Value Date     12/31/2019    K 4.6 12/31/2019     12/31/2019    CO2 21 12/31/2019    BUN 23 12/31/2019    CREATININE 0.79 12/31/2019    GFRAA >60.0 12/31/2019    LABGLOM >60.0 12/31/2019    GLUCOSE 123 12/31/2019    CALCIUM 8.2 12/31/2019       ASSESSMENT AND PLAN      # Sp L KEVEN on 12/30  - ortho managing  - pain control, nausea  - PT/OT    # Hypotension  - normally 110/60 - has been 90s/50s  - holding home BP meds for SBP<100  - likely 2/2 pain meds - may need to hold on discharge  - continue to monitor    # Anemia - likely postoperative acute blood loss anemia  - no active bleeding   - continue to monitor   - baseline around 13, Hgb 8.6    Disposition: Patient sp L KEVEN. Ortho managing. Continuing to monitor BP. If BP improved, ok for discharge from medical stand. Will continue to follow.     Trina Cordero, DO  Internal Medicine

## 2019-12-31 NOTE — PROGRESS NOTES
Physical Therapy Med Surg Initial Assessment  Facility/Department: [facility]  Room: N228/N228-01       NAME: Ruby Jacobs  : 1965 (62 y.o.)  MRN: [id number]  CODE STATUS: Full Code    Date of Service: 2019    Patient Diagnosis(es): Osteoarthritis of left hip, unspecified osteoarthritis type [M16.12]   No chief complaint on file. Patient Active Problem List    Diagnosis Date Noted    Osteoarthritis of left hip 2019        Past Medical History:   Diagnosis Date    Arthritis     Hx of blood clots     PONV (postoperative nausea and vomiting)      Past Surgical History:   Procedure Laterality Date    CHOLECYSTECTOMY      OVARIAN CYST SURGERY         Chart Reviewed: Yes  Patient assessed for rehabilitation services?: Yes  Family / Caregiver Present: No  General Comment  Comments: Pt awake in bed, agreeable to PT eval    Restrictions:  Restrictions/Precautions: Weight Bearing, Fall Risk  Lower Extremity Weight Bearing Restrictions  Left Lower Extremity Weight Bearing: Weight Bearing As Tolerated  Position Activity Restriction  Hip Precautions: Posterior hip precautions     SUBJECTIVE: Subjective: \"I just had a pill, so no pain\"  Pre Treatment Pain Screening  Pain at present: 0  Intervention List: Patient able to continue with treatment; Pt educated regarding timing of pain meds    Post Treatment Pain Screening:   Pain Screening  Patient Currently in Pain: Denies  Pain Assessment  Pain Level: 5(\"at most\" during [address], otherwise pt reports is okay)  Pain Type: Surgical pain  Pain Location: Hip  Pain Orientation: Left    Prior Level of Function:  Social/Functional History  Lives With: Son, Spouse(and 15 YO grandson)  Type of Home: House  Home Layout: Laundry in basement, Two level, Able to Live on Main level with bedroom/bathroom  Home Access: Stairs to enter without rails  Entrance Stairs - Number of Steps: 1  Bathroom Shower/Tub: Tub/Shower unit  Home Equipment: Standard walker  ADL Assistance: Independent  Homemaking Assistance: Independent  Ambulation Assistance: Independent(st walker \"off and on\")  Transfer Assistance: Independent  Active : Yes  Occupation: Full time employment  Type of occupation: crew management    OBJECTIVE:   Vision/Hearing:  Vision: Within Functional Limits  Hearing: Within functional limits    Cognition:  Overall Orientation Status: Within Functional Limits  Follows Commands: Within Functional Limits         ROM:  RLE AROM: WFL  LLE AROM : WFL  LLE General AROM: within hip precautions    Strength:  Strength RLE  Strength RLE: WFL  Strength LLE  Comment: observed >/= 3/5    Neuro:  Balance  Sitting - Static: Good  Sitting - Dynamic: Good  Standing - Static: Fair;-  Standing - Dynamic: Fair;-(heavily reliant on UEs for standing balance)        Sensation  Overall Sensation Status: Impaired(L hand tingling)    Bed mobility  Supine to Sit: Stand by assistance(increased time to self-assist LLE to EOB)    Transfers  Sit to Stand: Contact guard assistance  Stand to sit: Contact guard assistance  Bed to Chair: Contact guard assistance(education with hip precautions through various transfers, safety with use of AD, sequencing)    Ambulation  Ambulation?: Yes  Ambulation 1  Surface: level tile  Device: Rolling Walker  Assistance: Contact guard assistance  Quality of Gait: antalgic gait, decreased step length, increased offloading onto UEs  Distance: 30 ft x2  Comments: increased effort to complete distances to bathroom; Activity Tolerance  Activity Tolerance: Patient Tolerated treatment well    Exercises  Comments: Pt instructed in anti-embolics while up in chair including ankle pumps, quad sets, and glute sets; educated with frequency and dosage of HEP and PT POC while in-house. ASSESSMENT:   Body structures, Functions, Activity limitations: Decreased functional mobility ; Decreased strength;Decreased endurance;Decreased balance; Increased pain  Decision Making: Medium Complexity  History: medium  Exam: medium  Clinical Presentation: medium    Prognosis: Good  PT Education: Goals;Precautions;PT Role;General Safety;Plan of Care;Transfer Training;Gait Training    DISCHARGE RECOMMENDATIONS:  Discharge Recommendations: Patient would benefit from continued therapy after discharge    Assessment: Pt demonstrates the above deficits and decline in functional mobility s/p L KEVEN placing them at increased risk for falls. Pt would benefit from physical therapy to address above deficits and allow for safe return home at highest level of function, decrease risk for falls, and improve QOL.     REQUIRES PT FOLLOW UP: Yes      PLAN OF CARE:  Plan  Times per week: 5-7  Times per day: Twice a day  Current Treatment Recommendations: Strengthening, Functional Mobility Training, Neuromuscular Re-education, Home Exercise Program, Equipment Evaluation, Education, & procurement, Transfer Training, Gait Training, Safety Education & Training, Balance Training, Endurance Training, Stair training, Pain Management, Patient/Caregiver Education & Training, Positioning, Modalities, Manual Therapy - Soft Tissue Mobilization  Safety Devices  Type of devices: Chair alarm in place, Call light within reach    Goals:  Patient goals : to go home  Long term goals  Long term goal 1: indep with bed mobility  Long term goal 2: supervision with transfers  Long term goal 3: pt to ambulate >100 ft with supervision [employer]  Long term goal 4: pt to navigate >2 steps with supervision    [facility] (6 CLICK) [place] [address] Raw Score : 14     Therapy Time:   Individual   Time In 0827   Time Out 0859   Minutes 32       Gait: 10 min    Sarwat Heart, PT, 12/31/19 at 9:03 AM

## 2019-12-31 NOTE — PROGRESS NOTES
[facility]CC[facility]ERAP[facility] ORTHOPEDIC TREATMENT NOTE     Date: 2019  Patient Name: Ruby Jacobs        MRN: [id number]  Account: [de-identified]   : 1965  (62 y.o.)  Room: [place] [address]    Chart Review:  Diagnosis:  There were no encounter diagnoses. Restrictions:    Restrictions/Precautions  Restrictions/Precautions: Weight Bearing, Fall Risk  Position Activity Restriction  Hip Precautions: Posterior hip precautions      Subjective:  Patient states: \"I have a good family. \"   Pain:  Start of tx:  Pre Treatment Pain Screening  Pain at present: 2  Scale Used: Numeric Score  Intervention List: Patient able to continue with treatment, Patient declined any intervention    End of tx:  Pain Assessment  Patient Currently in Pain: Yes  Pain Assessment: 0-10  Pain Level: 3  Pain Type: Surgical pain  Pain Location: Hip  Pain Orientation: Left  Pain Descriptors: Aching, Sore  Pain Frequency: Continuous  Clinical Progression: Not changed  Response to Pain Intervention: Patient Satisfied    Objective: Pt performed functional mobility throughout room environment using a FWW with SBA. ADL shower session completed as follows. ADL  ADL  Equipment Provided: Sock aid, Dressing stick  Grooming: Modified independent (To perform oral care and hair care while standing at sink)  UE Bathing: Modified independent (use of detachable shower head)  LE Bathing: Minimal assistance (Assistance to wash/dry bilateral legs)  UE Dressing: Unable to assess ([address] only )  LE Dressing: Stand by assistance (Educated pt in use of AE to don/doff bilateral socks. Pt able to return demonstration with Min difficulty and VCs for techniques. Pt declined to don underwear or pants.)  Toileting: Supervision    Toilet Transfers  Toilet - Technique: Ambulating  Equipment Used: Standard bedside commode  Toilet Transfer: Stand by assistance  Toilet Transfers Comments: Increased time and effort.  VCs for safe transfer technique      Shower

## 2019-12-31 NOTE — PROGRESS NOTES
Physical Therapy Med Surg Daily Treatment Note  [facility]  Room: N228/N228-01       NAME: Layla Anguiano  : 1965 (62 y.o.)  MRN: [id number]  CODE STATUS: Full Code    Date of Service: 2019    Patient Diagnosis(es): Osteoarthritis of left hip, unspecified osteoarthritis type [M16.12]   No chief complaint on file. Patient Active Problem List    Diagnosis Date Noted    Osteoarthritis of left hip 2019        Past Medical History:   Diagnosis Date    Arthritis     Hx of blood clots     PONV (postoperative nausea and vomiting)      Past Surgical History:   Procedure Laterality Date    CHOLECYSTECTOMY      OVARIAN CYST SURGERY            Restrictions:  Restrictions/Precautions: Weight Bearing, Fall Risk  Lower Extremity Weight Bearing Restrictions  Left Lower Extremity Weight Bearing: Weight Bearing As Tolerated  Position Activity Restriction  Hip Precautions: Posterior hip precautions    SUBJECTIVE:  General  Chart Reviewed: Yes  Family / Caregiver Present: No  Subjective  Subjective: \"I am due for a pill\"     Pre Pain Assessment:  Pre Treatment Pain Screening  Pain at present: 1  Scale Used: Numeric Score  Intervention List: Patient able to continue with treatment  Pain Screening  Patient Currently in Pain: Yes       Post Pain Assessment:   Pain Assessment  Pain Assessment: 0-10  Pain Level: 8  Pain Type: Surgical pain;Acute pain  Pain Location: Hip  Pain Orientation: Left       OBJECTIVE:         Bed mobility  Comment: DNT pt in chair before and after tx. Transfers  Sit to Stand: Stand by assistance  Stand to sit: Stand by assistance  Car Transfer: Minimal Assistance(assist lifting LLE into and out of car. )  Comment: vc's for hand placement and controlled decent into chair, pt maintains hip precautions well.      Ambulation  Ambulation?: Yes  Ambulation 1  Surface: level tile  Device: Rolling Walker  Assistance: Contact guard assistance  Quality of Gait: antalgic Time In 1348   Time Out 1428   Minutes 40      Gait: 25  Trsf: 10  Therex: [address] [facility], , [facility], 12/31/19 at 3:09 PM

## 2019-12-31 NOTE — CARE COORDINATION
[facility] Case Management Initial Discharge Assessment    Met with Patient to discuss discharge plan. PCP: Edelmira Diego MD                                Date of Last Visit: 30 days per pt    If no PCP, list provided? N/A    Discharge Planning    Living Arrangements: independently at home    Who do you live with? Spouse, son    Who helps you with your care:  family    If lives at home:     Do you have any barriers navigating in your home? no    Patient can perform ADL? Yes    Current Services (outpatient and in home) :  None    Dialysis: No    Is transportation available to get to your appointments? Yes    DME Equipment:  yes - walker    Respiratory equipment: None    Respiratory provider:  no     Pharmacy:  yes - drug [id number] [facility] with Medication Assistance Program?  Yes      Patient agreeable to [facility]? Yes, [address] [place] [address]    Patient agreeable to SNF/Rehab? No    Other discharge needs identified? N/A    Freedom of choice list provided with basic dialogue that supports the patient's individualized plan of care/goals and shares the quality data associated with the providers. Yes    Does Patient Have a High-Risk for Readmission Diagnosis (CHF, PN, MI, COPD)? No    If Yes,     Consult with pulmonologist? N/A   Consult with cardiologist? N/A   Cardiac Rehab referral if EF <35%? N/A   Consult with Pharmacy for medication assessment prior to discharge? N/A   Consult with Behavioral health to aid in depression, anxiety, or coping issues? N/A   Palliative Care Consult? N/A   Pulmonary Rehab order for COPD, PN, and CHF (if EF > 35%)? N/A    Does patient have a reliable scale and know how to read it (for CHF)? N/A   Nutrition consult for CHF? N/A   Respiratory therapy consult that includes bedside instruction on administration of nebulizers and/or inhalers, and assessment of oxygen and equipment needs in the home?  N/A    The plan for Transition of Care is related to the

## 2019-12-31 NOTE — PROGRESS NOTES
[facility] OCCUPATIONAL THERAPY EVALUATION - ACUTE     NAME: Carolina Oconnor  : 1965 (62 y.o.)  MRN: [id number]  CODE STATUS: Full Code  Room: [place] [address]    Date of Service: 2019    Patient Diagnosis(es): Osteoarthritis of left hip, unspecified osteoarthritis type [M16.12]   No chief complaint on file. Patient Active Problem List    Diagnosis Date Noted    Osteoarthritis of left hip 2019        Past Medical History:   Diagnosis Date    Arthritis     Hx of blood clots     PONV (postoperative nausea and vomiting)      Past Surgical History:   Procedure Laterality Date    CHOLECYSTECTOMY      OVARIAN CYST SURGERY          Restrictions  Restrictions/Precautions: Weight Bearing, Fall Risk  Lower Extremity Weight Bearing Restrictions  Left Lower Extremity Weight Bearing: Weight Bearing As Tolerated  Position Activity Restriction  Hip Precautions: Posterior hip precautions     Safety Devices: Safety Devices  Safety Devices in place: Yes  Type of devices:  All fall risk precautions in place    Subjective  Pre Treatment Pain Screening  Pain at present: 2  Scale Used: Numeric Score  Intervention List: Patient able to continue with treatment, Patient declined any intervention    Pain Reassessment:   Pain Assessment  Patient Currently in Pain: Yes  Pain Assessment: 0-10  Pain Level: 5(\"At most\" during weightbearing, otherwise reports 2)  Pain Type: Surgical pain  Pain Location: Hip  Pain Orientation: Left  Pain Descriptors: Aching, Sore       Prior Level of Function:  Social/Functional History  Lives With: Son, Spouse(and 15 YO grandson)  Type of Home: House  Home Layout: Laundry in basement, Two level, Able to Live on Main level with bedroom/bathroom  Home Access: Stairs to enter without rails  Entrance Stairs - Number of Steps: 1  Bathroom Shower/Tub: Tub/Shower unit  Home Equipment: Standard walker  ADL Assistance: Independent  Homemaking Assistance: Independent  Ambulation Assistance:

## 2019-12-31 NOTE — FLOWSHEET NOTE
Clarified with Dr. Lisa Andersen, on call for Dr. Monica Pepe, that patient is to get her TXA tonight and to hold the lovenox until tomorrow. Dr. Lisa Andersen was also made aware that patient's hemovac drain fell out when she got up to the bsc. No bleeding noted from the insertion site. Will continue to monitor.

## 2019-12-31 NOTE — CARE COORDINATION
LSW called  [facility] and they cannot take the pt. LSW called [facility] and made the [place] [address] referral.  Info was faxed but there was no C order at this time. Order will need to be faxed to [facility] at [phone].

## 2019-12-31 NOTE — CONSULTS
Consult Note    Reason for Consult:  Management of osteoarthritis, tobacco abuse and history of DVT    Requesting Physician:  Vila Jeans, MD    HISTORY OF PRESENT ILLNESS:    The patient is a 62 y.o. female who is s/p left KEVEN per Dr. Dharmesh Aldridge      Past Medical History:   Diagnosis Date    Arthritis     Hx of blood clots     PONV (postoperative nausea and vomiting)        Past Surgical History:   Procedure Laterality Date    CHOLECYSTECTOMY      OVARIAN CYST SURGERY         Prior to Admission medications    Medication Sig Start Date End Date Taking? Authorizing Provider   carvedilol (COREG) 6.25 MG tablet Take 6.25 mg by mouth 2 times daily 2/1/18  Yes Historical Provider, MD   furosemide (LASIX) 40 MG tablet Take 40 mg by mouth Daily 7/22/18  Yes Historical Provider, MD   lisinopril (PRINIVIL;ZESTRIL) 5 MG tablet Take 2.5 mg by mouth daily 2/1/18  Yes Historical Provider, MD   sertraline (ZOLOFT) 100 MG tablet Take 100 mg by mouth daily   Yes Historical Provider, MD   spironolactone (ALDACTONE) 25 MG tablet Take 25 mg by mouth Daily   Yes Historical Provider, MD   enoxaparin (LOVENOX) 40 MG/0.4ML injection Inject into the skin 2 times daily [facility] preop- patient will clarify dose.    Yes Historical Provider, MD   Magnesium Oxide 500 MG CAPS Take 500 mg by mouth daily    Historical Provider, MD   potassium chloride (KLOR-CON M) 20 MEQ extended release tablet Take 40 mEq by mouth daily    Historical Provider, MD   rivaroxaban (XARELTO) 20 MG TABS tablet Take 15 mg by mouth daily 2/13/19   Historical Provider, MD       Scheduled Meds:   magnesium oxide  400 mg Oral Daily    carvedilol  6.25 mg Oral BID    furosemide  40 mg Oral Daily    lisinopril  2.5 mg Oral Daily    potassium chloride  40 mEq Oral Daily    [START ON 12/31/2019] rivaroxaban  15 mg Oral Daily    sertraline  100 mg Oral Daily    spironolactone  25 mg Oral Daily    enoxaparin  40 mg Subcutaneous Daily    sodium chloride flush  10 mL Intravenous 2 times per day    acetaminophen  1,000 mg Oral 3 times per day    docusate sodium  100 mg Oral BID    sennosides-docusate sodium  1 tablet Oral BID    aspirin  81 mg Oral BID    traMADol  50 mg Oral 4 times per day    tranexamic acid  1,950 mg Oral Once    [START ON 12/31/2019] tranexamic acid  1,950 mg Oral Once    vancomycin  1,000 mg Intravenous Q12H     Continuous Infusions:   sodium chloride       PRN Meds:sodium chloride flush, magnesium hydroxide, ondansetron, HYDROmorphone, HYDROcodone 5 mg - acetaminophen    Allergies   Allergen Reactions    Codeine Shortness Of Breath    Penicillins Hives     Anaphlaxis    Hydrogen Peroxide Rash       Social History     Socioeconomic History    Marital status:      Spouse name: Not on file    Number of children: Not on file    Years of education: Not on file    Highest education level: Not on file   Occupational History    Not on file   Social Needs    Financial resource strain: Not on file    Food insecurity:     Worry: Not on file     Inability: Not on file    Transportation needs:     Medical: Not on file     Non-medical: Not on file   Tobacco Use    Smoking status: Current Every Day Smoker     Packs/day: 0.50    Smokeless tobacco: Never Used   Substance and Sexual Activity    Alcohol use: Not Currently    Drug use: Never    Sexual activity: Not on file   Lifestyle    Physical activity:     Days per week: Not on file     Minutes per session: Not on file    Stress: Not on file   Relationships    Social connections:     Talks on phone: Not on file     Gets together: Not on file     Attends Sabianist service: Not on file     Active member of club or organization: Not on file     Attends meetings of clubs or organizations: Not on file     Relationship status: Not on file    Intimate partner violence:     Fear of current or ex partner: Not on file     Emotionally abused: Not on file     Physically abused: Not on file Forced sexual activity: Not on file   Other Topics Concern    Not on file   Social History Narrative    Not on file       History reviewed. No pertinent family history. Review Of Systems:   CONSTITUTIONAL:  negative for  fevers, chills and sweats  HEENT:  negative for  hearing loss, tinnitus and ear drainage  RESPIRATORY:  negative for  dry cough, cough with sputum and dyspnea  CARDIOVASCULAR:  negative for  chest pain, dyspnea, palpitations  GASTROINTESTINAL:  negative for nausea, vomiting and change in bowel habits  MUSCULOSKELETAL:  negative for  myalgias, arthralgias and pain  NEUROLOGICAL:  negative for headaches, dizziness and seizures  BEHAVIOR/PSYCH:  negative for poor appetite, increased appetite and decreased sleep    Physical Exam:  Vitals:    12/30/19 1816 12/30/19 1832 12/30/19 1844 12/30/19 1845   BP: (!) 92/42 (!) 84/44 (!) 99/47    Pulse: 60 (!) 44 72 61   Resp:   16    Temp:       TempSrc:       SpO2: 96% 93% 95% 97%   Weight:       Height:           CONSTITUTIONAL:  awake, alert, cooperative, no apparent distress, and appears stated age  NECK:  Supple, symmetrical, trachea midline, no adenopathy, thyroid symmetric, not enlarged and no tenderness, skin normal  BACK:  Symmetric, no curvature, spinous processes are non-tender on palpation, paraspinous muscles are non-tender on palpation, no costal vertebral tenderness  LUNGS:  No increased work of breathing, good air exchange, clear to auscultation bilaterally, no crackles or wheezing  CARDIOVASCULAR:  Normal apical impulse, regular rate and rhythm, normal S1 and S2, no S3 or S4, and no murmur noted  ABDOMEN:  No scars, normal bowel sounds, soft, non-distended, non-tender, no masses palpated, no hepatosplenomegally  MUSCULOSKELETAL:  There is no redness, warmth, of the joints  NEUROLOGIC:  Awake, alert, oriented to name, place and time. Cranial nerves II-XII are grossly intact. Motor is 5 out of 5 bilaterally.   Cerebellar finger to nose, heel

## 2019-12-31 NOTE — PLAN OF CARE
See OT evaluation for all goals and OT POC.  Electronically signed by GABRIEL Smith/L on 12/31/2019 at 9:33 AM

## 2020-01-01 VITALS
HEIGHT: 64 IN | SYSTOLIC BLOOD PRESSURE: 125 MMHG | OXYGEN SATURATION: 99 % | HEART RATE: 83 BPM | DIASTOLIC BLOOD PRESSURE: 50 MMHG | BODY MASS INDEX: 44.14 KG/M2 | TEMPERATURE: 98.4 F | WEIGHT: 258.56 LBS | RESPIRATION RATE: 16 BRPM

## 2020-01-01 LAB
HCT VFR BLD CALC: 25.7 % (ref 37–47)
HEMOGLOBIN: 8.6 G/DL (ref 12–16)
MCH RBC QN AUTO: 29.4 PG (ref 27–31.3)
MCHC RBC AUTO-ENTMCNC: 33.6 % (ref 33–37)
MCV RBC AUTO: 87.5 FL (ref 82–100)
PDW BLD-RTO: 15.3 % (ref 11.5–14.5)
PLATELET # BLD: 133 K/UL (ref 130–400)
RBC # BLD: 2.94 M/UL (ref 4.2–5.4)
WBC # BLD: 8.2 K/UL (ref 4.8–10.8)

## 2020-01-01 PROCEDURE — 97535 SELF CARE MNGMENT TRAINING: CPT

## 2020-01-01 PROCEDURE — 97116 GAIT TRAINING THERAPY: CPT

## 2020-01-01 PROCEDURE — 6370000000 HC RX 637 (ALT 250 FOR IP): Performed by: ORTHOPAEDIC SURGERY

## 2020-01-01 PROCEDURE — 36415 COLL VENOUS BLD VENIPUNCTURE: CPT

## 2020-01-01 PROCEDURE — 97110 THERAPEUTIC EXERCISES: CPT

## 2020-01-01 PROCEDURE — 85027 COMPLETE CBC AUTOMATED: CPT

## 2020-01-01 RX ORDER — PSEUDOEPHEDRINE HCL 30 MG
100 TABLET ORAL 2 TIMES DAILY
Qty: 60 CAPSULE | Refills: 0 | Status: SHIPPED | OUTPATIENT
Start: 2020-01-01

## 2020-01-01 RX ORDER — SENNA AND DOCUSATE SODIUM 50; 8.6 MG/1; MG/1
1 TABLET, FILM COATED ORAL 2 TIMES DAILY
Qty: 60 TABLET | Refills: 0 | Status: SHIPPED | OUTPATIENT
Start: 2020-01-01

## 2020-01-01 RX ADMIN — SERTRALINE 100 MG: 100 TABLET, FILM COATED ORAL at 08:38

## 2020-01-01 RX ADMIN — LISINOPRIL 2.5 MG: 5 TABLET ORAL at 08:38

## 2020-01-01 RX ADMIN — TRAMADOL HYDROCHLORIDE 50 MG: 50 TABLET, FILM COATED ORAL at 08:41

## 2020-01-01 RX ADMIN — ACETAMINOPHEN 1000 MG: 500 TABLET ORAL at 06:00

## 2020-01-01 RX ADMIN — TRAMADOL HYDROCHLORIDE 50 MG: 50 TABLET, FILM COATED ORAL at 02:33

## 2020-01-01 RX ADMIN — DOCUSATE SODIUM 100 MG: 100 CAPSULE, LIQUID FILLED ORAL at 08:37

## 2020-01-01 RX ADMIN — POTASSIUM CHLORIDE 40 MEQ: 20 TABLET, EXTENDED RELEASE ORAL at 08:38

## 2020-01-01 RX ADMIN — CARVEDILOL 6.25 MG: 6.25 TABLET, FILM COATED ORAL at 08:38

## 2020-01-01 RX ADMIN — SENNOSIDES AND DOCUSATE SODIUM 1 TABLET: 8.6; 5 TABLET ORAL at 08:37

## 2020-01-01 RX ADMIN — HYDROCODONE BITARTRATE AND ACETAMINOPHEN 1 TABLET: 5; 325 TABLET ORAL at 02:34

## 2020-01-01 RX ADMIN — ACETAMINOPHEN 1000 MG: 500 TABLET ORAL at 14:15

## 2020-01-01 RX ADMIN — HYDROCODONE BITARTRATE AND ACETAMINOPHEN 1 TABLET: 5; 325 TABLET ORAL at 08:37

## 2020-01-01 RX ADMIN — Medication 400 MG: at 08:38

## 2020-01-01 RX ADMIN — ASPIRIN 81 MG: 81 TABLET, COATED ORAL at 08:38

## 2020-01-01 ASSESSMENT — PAIN SCALES - GENERAL
PAINLEVEL_OUTOF10: 4
PAINLEVEL_OUTOF10: 2
PAINLEVEL_OUTOF10: 5
PAINLEVEL_OUTOF10: 3
PAINLEVEL_OUTOF10: 4
PAINLEVEL_OUTOF10: 2
PAINLEVEL_OUTOF10: 4
PAINLEVEL_OUTOF10: 4

## 2020-01-01 ASSESSMENT — PAIN DESCRIPTION - PROGRESSION
CLINICAL_PROGRESSION: NOT CHANGED

## 2020-01-01 ASSESSMENT — PAIN DESCRIPTION - DESCRIPTORS
DESCRIPTORS: ACHING
DESCRIPTORS: ACHING

## 2020-01-01 ASSESSMENT — PAIN DESCRIPTION - ORIENTATION
ORIENTATION: LEFT
ORIENTATION: LEFT

## 2020-01-01 ASSESSMENT — PAIN DESCRIPTION - LOCATION
LOCATION: HIP
LOCATION: HIP

## 2020-01-01 NOTE — DISCHARGE INSTR - COC
Continuity of Care Form    Patient Name: Deep Chan   :  1965  MRN:  [id number]    Admit date:  2019  Discharge date:  2020    Code Status Order: Full Code   Advance Directives:   [facility] Documentation     Date/Time Healthcare Directive Type of Healthcare Directive Copy in [facility] [address] Agent's Name Healthcare Agent's Phone Number    19 0175  No, patient does not have an advance directive for healthcare treatment -- -- -- -- --    19 0942  No, patient does not have an advance directive for healthcare treatment -- -- -- -- --          Admitting Physician:  Oliverio Ramos MD  PCP: Alfonzo Douglas MD    Discharging Nurse: [facility] Unit/Room#: I594/S037-95  Discharging Unit Phone Number: [phone]    Emergency Contact:   Extended Emergency Contact Information  Primary Emergency Contact: Robson Cai  Home Phone: [phone]  Relation: Spouse    Past Surgical History:  Past Surgical History:   Procedure Laterality Date    CHOLECYSTECTOMY      OVARIAN CYST SURGERY         Immunization History: There is no immunization history on file for this patient.     Active Problems:  Patient Active Problem List   Diagnosis Code    Osteoarthritis of left hip M16.12       Isolation/Infection:   Isolation          No Isolation        Patient Infection Status     None to display          Nurse Assessment:  Last Vital Signs: BP (!) 101/44   Pulse 92   Temp 98.4 °F (36.9 °C) (Oral)   Resp 18   Ht 5' 4\" (1.626 m)   Wt 258 lb 9 oz (117.3 kg)   SpO2 93%   BMI 44.38 kg/m²     Last documented pain score (0-10 scale): Pain Level: 5  Last Weight:   Wt Readings from Last 1 Encounters:   19 258 lb 9 oz (117.3 kg)     Mental Status:  oriented and alert    IV Access:  - None    Nursing Mobility/ADLs:  Walking   Assisted  Transfer  Assisted  Bathing  Assisted  Dressing  Assisted  Toileting  Assisted  Feeding  Independent  Med [facility] Delivery   whole    Wound Care Documentation and Therapy:        Elimination:  Continence:   · Bowel: Yes  · Bladder: Yes  Urinary Catheter: None   Colostomy/Ileostomy/Ileal Conduit: No       Date of Last BM: 12/30/2019    Intake/Output Summary (Last 24 hours) at 1/1/2020 1136  Last data filed at 1/1/2020 0641  Gross per 24 hour   Intake 1700 ml   Output 1150 ml   Net 550 ml     I/O last 3 completed shifts: In: 3110.8 [P.O.:1680; I.V.:1430.8]  Out: 1150 [Urine:1150]    Safety Concerns: At Risk for Falls    Impairments/Disabilities:      None    Nutrition Therapy:  Current Nutrition Therapy:   - Oral Diet:  General    Routes of Feeding: Oral  Liquids: Thin Liquids  Daily Fluid Restriction: no  Last Modified Barium Swallow with Video (Video Swallowing Test): not done    Treatments at the Time of Hospital Discharge:   Respiratory Treatments: None  Oxygen Therapy:  is not on home oxygen therapy.   Ventilator:    - No ventilator support    Rehab Therapies: Physical Therapy and Occupational Therapy  Weight Bearing Status/Restrictions: No weight bearing restirctions  Other Medical Equipment (for information only, NOT a DME order):  walker  Other Treatments: None    Patient's personal belongings (please select all that are sent with patient):  None    RN SIGNATURE:  Electronically signed by Janene Downey RN on 1/1/20 at 1:29 PM    CASE MANAGEMENT/SOCIAL WORK SECTION    Inpatient Status Date: ***    Readmission Risk Assessment Score:  Readmission Risk              Risk of Unplanned Readmission:        11           · [facility]  · [phone]    / signature: {Esignature:[id number]}    PHYSICIAN SECTION    Prognosis: Good    Condition at Discharge: Stable    Rehab Potential (if transferring to Rehab): Good    Recommended Labs or Other Treatments After Discharge:     Physician Certification: I certify the above information and transfer of

## 2020-01-01 NOTE — FLOWSHEET NOTE
2550- Shift assessment completed at this time, Pt A&Ox4, denies chest pain/SOB, denies nausea/vomiting, denies numbness/tingling, 2 Aquacel dressings to left hip with moderate amount of drainage at borders, drain site insertion with suture present, swelling present to bilateral lower extremities left lower more edematous than right, pt denies any difficulty with urinating and bowel movements, states pain is low when sitting and increases with movement and standing, will medicate accordingly, pt denies further needs at this time-[facility]  0900- Dr. Melodie Cheek made aware of pt having suture, per Dr. Melodie Cheek okay to leave suture in drain site until pt follow up appointment, new order to change aquacel dressing due to saturation-[facility]  1400- AVS faxed to [facility] home care-[facility]  1405- Aquacel dressing changed to left hip, pt tolerated well, edges well approximated with staples intact, pt denies wanting to wait for pain medication and will just get pain medication once she gets home-[facility]  1406- Discharge instructions given to pt, pt denies any questions at this time, EDILBERTO hose on pt, pt verbalizes understanding of discharge instructions-[facility]  1420- Pt escorted off unit via transporter to main entrance-[facility]  Electronically signed by Sheila Henson RN on 1/1/2020 at 2:31 PM

## 2020-01-01 NOTE — CARE COORDINATION
FAXED 015 Sid Molina RN TO FAX AVS.  CALL PLACED TO ADVISE OF DISCHARGE AND NEED FOR START OF CARE ON 1/2/20

## 2020-01-01 NOTE — PROGRESS NOTES
Physical Therapy Med Surg Daily Treatment Note  Facility/Department: [facility]  Room: N228/N228-01       NAME: Teresa Patel  : 1965 (62 y.o.)  MRN: [id number]  CODE STATUS: Full Code    Date of Service: 2020    Patient Diagnosis(es): Osteoarthritis of left hip, unspecified osteoarthritis type [M16.12]   No chief complaint on file. Patient Active Problem List    Diagnosis Date Noted    Osteoarthritis of left hip 2019        Past Medical History:   Diagnosis Date    Arthritis     Hx of blood clots     PONV (postoperative nausea and vomiting)      Past Surgical History:   Procedure Laterality Date    CHOLECYSTECTOMY      OVARIAN CYST SURGERY           Restrictions  Restrictions/Precautions: Weight Bearing, Fall Risk  Lower Extremity Weight Bearing Restrictions  Left Lower Extremity Weight Bearing: Weight Bearing As Tolerated  Position Activity Restriction  Hip Precautions: Posterior hip precautions    Subjective   General  Chart Reviewed: Yes  Subjective  Subjective: Pt expresses fearf of walking \"I don't know why\" States she will be going home today most likely. Pain Screening  Patient Currently in Pain: Yes     Pain Reassessment:   Pain Assessment  Pain Level: 5  Pain Location: Hip  Pain Orientation: Left  Pain Descriptors: Aching       Orientation  Orientation  Overall Orientation Status: Within Functional Limits    Objective   Bed mobility  Comment: Not tested- pt up in chair before and after tx    Transfers  Sit to Stand: Stand by assistance  Stand to sit: Stand by assistance  Comment: VCs for Lt LE placement prior to sitting    Ambulation 1  Surface: level tile  Device: Rolling Walker  Assistance: Stand by assistance  Quality of Gait: Increased UE WBing on [employer], decreased stride length.  VCs to not place [employer] too far forward  Distance: 40ft, 20ft  Stairs/Curb  Stairs?: No    Neuromuscular Education  Neuromuscular Comments: Standing weightshifts before gait     Exercises  Hip Flexion: x10 in standing (Lt)  Knee Long Arc Quad: x10  Neurodevelopmental Techniques: Standing HR to improve equal WBing                     Assessment    Post-Pain  Pain rating (0-10 scale):4/10  Location and pain description same as pre-treatment unless indicated. Action: [] N/A  [x] Nursing notified  [] Pt declined intervention    Body structures, Functions, Activity limitations: Decreased functional mobility ; Decreased strength;Decreased endurance;Decreased balance; Increased pain  Assessment: Pt able to increase ambulatory distance today with improved tolerance. Occ VCs to avoid twisting with standing conversation and with change in directions using [employer]. Education on medication, and increasing activity at home to further progress pt.    Prognosis: Good  REQUIRES PT FOLLOW UP: Yes     Discharge Recommendations:  Patient would benefit from continued therapy after discharge    Goals  Long term goals  Long term goal 1: indep with bed mobility  Long term goal 2: supervision with transfers  Long term goal 3: pt to ambulate >100 ft with supervision [employer]  Long term goal 4: pt to navigate >2 steps with supervision  Patient Goals   Patient goals : to go home    Plan    Plan  Times per week: 5-7  Times per day: Twice a day  Current Treatment Recommendations: Strengthening, Functional Mobility Training, Neuromuscular Re-education, Home Exercise Program, Equipment Evaluation, Education, & procurement, Transfer Training, Gait Training, Safety Education & Training, Balance Training, Endurance Training, Stair training, Pain Management, Patient/Caregiver Education & Training, Positioning, Modalities, Manual Therapy - Soft Tissue Mobilization  Safety Devices  Type of devices: Chair alarm in place, Call light within reach     [facility] (6 CLICK) [address] Mobility Raw Score : 18     Therapy Time   Individual   Time In 0848   Time Out 0911   Minutes 23     Timed Code Treatment Minutes: 23 Minutes   Gait: 10  Trans:

## 2020-01-01 NOTE — PROGRESS NOTES
Physical Therapy Rehab Treatment Note  Facility/Department[facility]  Room: N228/N228-01       NAME: Finesse Larose  : 1965 (62 y.o.)  MRN: [id number]  CODE STATUS: Full Code    Date of Service: 2020  Chart Reviewed: Yes    Restrictions:  Restrictions/Precautions: Weight Bearing, Fall Risk  Lower Extremity Weight Bearing Restrictions  Left Lower Extremity Weight Bearing: Weight Bearing As Tolerated  Position Activity Restriction  Hip Precautions: Posterior hip precautions       SUBJECTIVE: Subjective: Pt reports discomfort, has been sitting up all day. Pain Screening  Patient Currently in Pain: Yes  Pre Treatment Pain Screening  Intervention List: Patient able to continue with treatment    Post Treatment Pain Screening:  Pain Assessment  Pain Level: 2  Pain Location: Hip  Pain Orientation: Left  Pain Descriptors: Aching    OBJECTIVE:   Overall Orientation Status: Within Functional Limits           Neuromuscular Education  Neuromuscular Comments: Standing weightshifts at [employer]: forward    Bed mobility  Supine to Sit: Stand by assistance  Sit to Supine: Minimal assistance  Comment: VCs to improve technique such as with scooting, using step stool     Transfers  Sit to Stand: Stand by assistance  Stand to sit: Stand by assistance  Comment: VCs for Lt LE placement prior to sitting    Ambulation  Ambulation?: Yes  Ambulation 1  Surface: level tile  Device: Rolling Walker  Assistance: Stand by assistance  Quality of Gait: Improved WBing, decreased step length  Distance: 40ft  Stairs/Curb  Stairs?: No                   Exercises  Hip Flexion: x10 in standing (Lt)  Knee Long Arc Quad: x10  Neurodevelopmental Techniques: Standing HR to improve equal WBing      Post-Pain  Pain rating (0-10 scale):3/10  Location and pain description same as pre-treatment unless indicated.   Action: [] N/A  [] Nursing notified  [x] Pt declined intervention    ASSESSMENT/COMMENTS:  Body structures, Functions, Activity limitations: Decreased functional mobility ; Decreased strength;Decreased endurance;Decreased balance; Increased pain  Assessment: Pt with improved WBing with ambulation, decreased heavy WBing on [employer]. Initiated weightshifting with fair tolerance. Increased difficulty with sit to supine transition.    Prognosis: Good  REQUIRES PT FOLLOW UP: Yes    PLAN OF CARE/Safety:   Safety Devices  Type of devices: Chair alarm in place;Call light within reach      Therapy Time:   Individual   Time In 1247   Time Out 1310   Minutes 23     Minutes:23      Transfer/Bed mobility trainin      Gait training: 10      Neuro re education:      Therapeutic ex:      Mylene Lerma PTA, 20 at 1:14 PM     Electronically signed by Mylene Lerma PTA on 2020 at 1:15 PM

## 2023-04-12 DIAGNOSIS — R92.8 ABNORMAL MAMMOGRAM OF LEFT BREAST: Primary | ICD-10-CM

## 2023-04-12 DIAGNOSIS — R91.1 LUNG NODULE: ICD-10-CM

## 2023-04-12 DIAGNOSIS — F17.210 CIGARETTE NICOTINE DEPENDENCE WITHOUT COMPLICATION: ICD-10-CM

## 2023-04-21 DIAGNOSIS — Z00.00 ROUTINE GENERAL MEDICAL EXAMINATION AT A HEALTH CARE FACILITY: Primary | ICD-10-CM

## 2023-04-21 RX ORDER — KETOCONAZOLE 20 MG/G
CREAM TOPICAL DAILY
Qty: 30 G | Refills: 0 | Status: SHIPPED | OUTPATIENT
Start: 2023-04-21

## 2023-04-21 RX ORDER — METHYLPREDNISOLONE 4 MG/1
TABLET ORAL
Qty: 1 EACH | Refills: 0 | Status: SHIPPED | OUTPATIENT
Start: 2023-04-21 | End: 2023-04-26 | Stop reason: ALTCHOICE

## 2023-04-26 ENCOUNTER — OFFICE VISIT (OUTPATIENT)
Dept: PRIMARY CARE | Facility: CLINIC | Age: 58
End: 2023-04-26
Payer: COMMERCIAL

## 2023-04-26 VITALS
HEIGHT: 64 IN | OXYGEN SATURATION: 97 % | HEART RATE: 100 BPM | WEIGHT: 278.4 LBS | BODY MASS INDEX: 47.53 KG/M2 | TEMPERATURE: 96 F | RESPIRATION RATE: 22 BRPM | SYSTOLIC BLOOD PRESSURE: 94 MMHG | DIASTOLIC BLOOD PRESSURE: 74 MMHG

## 2023-04-26 DIAGNOSIS — R06.02 SOB (SHORTNESS OF BREATH): ICD-10-CM

## 2023-04-26 DIAGNOSIS — I50.9 CONGESTIVE HEART FAILURE, UNSPECIFIED HF CHRONICITY, UNSPECIFIED HEART FAILURE TYPE (MULTI): Primary | ICD-10-CM

## 2023-04-26 DIAGNOSIS — R60.9 EDEMA, UNSPECIFIED TYPE: ICD-10-CM

## 2023-04-26 DIAGNOSIS — I89.0 LYMPHEDEMA: ICD-10-CM

## 2023-04-26 DIAGNOSIS — R14.0 ABDOMINAL DISTENSION: ICD-10-CM

## 2023-04-26 PROBLEM — F41.0 PANIC ATTACKS: Status: ACTIVE | Noted: 2023-04-26

## 2023-04-26 PROBLEM — F32.A DEPRESSION: Status: ACTIVE | Noted: 2023-04-26

## 2023-04-26 PROBLEM — I10 HYPERTENSION: Status: ACTIVE | Noted: 2023-04-26

## 2023-04-26 PROBLEM — I42.0 NONISCHEMIC DILATED CARDIOMYOPATHY (MULTI): Status: ACTIVE | Noted: 2023-04-26

## 2023-04-26 PROBLEM — E28.2 PCOD (POLYCYSTIC OVARIAN DISEASE): Status: ACTIVE | Noted: 2023-04-26

## 2023-04-26 PROBLEM — I82.890 JUGULAR VEIN THROMBOSIS: Status: ACTIVE | Noted: 2023-04-26

## 2023-04-26 PROBLEM — K21.9 GERD (GASTROESOPHAGEAL REFLUX DISEASE): Status: ACTIVE | Noted: 2023-04-26

## 2023-04-26 PROBLEM — F17.210 NICOTINE DEPENDENCE, CIGARETTES, UNCOMPLICATED: Status: ACTIVE | Noted: 2023-04-26

## 2023-04-26 PROBLEM — G47.00 INSOMNIA: Status: ACTIVE | Noted: 2023-04-26

## 2023-04-26 PROBLEM — I42.9 CARDIOMYOPATHY (MULTI): Status: ACTIVE | Noted: 2023-04-26

## 2023-04-26 PROBLEM — I50.33 ACUTE ON CHRONIC DIASTOLIC HEART FAILURE WITH PRESERVED EJECTION FRACTION (MULTI): Status: ACTIVE | Noted: 2023-04-26

## 2023-04-26 PROBLEM — J44.9 CHRONIC OBSTRUCTIVE PULMONARY DISEASE (MULTI): Status: ACTIVE | Noted: 2023-04-26

## 2023-04-26 PROCEDURE — 3074F SYST BP LT 130 MM HG: CPT | Performed by: FAMILY MEDICINE

## 2023-04-26 PROCEDURE — 3008F BODY MASS INDEX DOCD: CPT | Performed by: FAMILY MEDICINE

## 2023-04-26 PROCEDURE — 3078F DIAST BP <80 MM HG: CPT | Performed by: FAMILY MEDICINE

## 2023-04-26 PROCEDURE — 99214 OFFICE O/P EST MOD 30 MIN: CPT | Performed by: FAMILY MEDICINE

## 2023-04-26 RX ORDER — SPIRONOLACTONE 25 MG/1
1 TABLET ORAL DAILY
COMMUNITY
Start: 2018-08-02 | End: 2023-07-27 | Stop reason: ALTCHOICE

## 2023-04-26 RX ORDER — FLUTICASONE PROPIONATE 50 MCG
2 SPRAY, SUSPENSION (ML) NASAL DAILY
COMMUNITY
Start: 2022-01-03

## 2023-04-26 RX ORDER — AMMONIUM LACTATE 12 G/100G
LOTION TOPICAL AS NEEDED
COMMUNITY
Start: 2022-02-23

## 2023-04-26 RX ORDER — HYDROXYZINE HYDROCHLORIDE 25 MG/1
25 TABLET, FILM COATED ORAL 4 TIMES DAILY
COMMUNITY
Start: 2022-03-13 | End: 2023-07-27 | Stop reason: ALTCHOICE

## 2023-04-26 RX ORDER — ALBUTEROL SULFATE 90 UG/1
2 AEROSOL, METERED RESPIRATORY (INHALATION) EVERY 4 HOURS PRN
Qty: 18 G | Refills: 2 | Status: SHIPPED | OUTPATIENT
Start: 2023-04-26 | End: 2023-08-24 | Stop reason: ALTCHOICE

## 2023-04-26 RX ORDER — SERTRALINE HYDROCHLORIDE 100 MG/1
100 TABLET, FILM COATED ORAL DAILY
COMMUNITY
End: 2023-08-30

## 2023-04-26 RX ORDER — RIVAROXABAN 20 MG/1
20 TABLET, FILM COATED ORAL DAILY
COMMUNITY

## 2023-04-26 RX ORDER — FUROSEMIDE 40 MG/1
40 TABLET ORAL 2 TIMES DAILY
COMMUNITY
Start: 2018-08-02 | End: 2023-07-27 | Stop reason: ALTCHOICE

## 2023-04-26 RX ORDER — ALBUTEROL SULFATE 90 UG/1
2 AEROSOL, METERED RESPIRATORY (INHALATION) EVERY 4 HOURS PRN
COMMUNITY
Start: 2022-12-15 | End: 2023-04-26 | Stop reason: SDUPTHER

## 2023-04-26 ASSESSMENT — PATIENT HEALTH QUESTIONNAIRE - PHQ9
1. LITTLE INTEREST OR PLEASURE IN DOING THINGS: SEVERAL DAYS
2. FEELING DOWN, DEPRESSED OR HOPELESS: NOT AT ALL
SUM OF ALL RESPONSES TO PHQ9 QUESTIONS 1 & 2: 1
10. IF YOU CHECKED OFF ANY PROBLEMS, HOW DIFFICULT HAVE THESE PROBLEMS MADE IT FOR YOU TO DO YOUR WORK, TAKE CARE OF THINGS AT HOME, OR GET ALONG WITH OTHER PEOPLE: SOMEWHAT DIFFICULT

## 2023-04-26 NOTE — PROGRESS NOTES
"Subjective   Patient ID: Lisa Guerrero is a 58 y.o. female who presents for Edema (Lower legs oozing fluid, abdominal distension, SOB,) and Shortness of Breath.    Saw cardiologist 3 weeks ago and adjusted medication   changed lisinopril to ? Seeing again soon  Get SOB and feels from abdomen   ducloax helped with distension   stools are hard   Pulse ox 97% reviewed CT and ECHO         Review of Systems    Objective   BP 94/74 (BP Location: Right arm, Patient Position: Sitting, BP Cuff Size: Large adult)   Pulse 100   Temp 35.6 °C (96 °F) (Temporal)   Resp 22   Ht 1.626 m (5' 4\")   Wt 126 kg (278 lb 6.4 oz)   SpO2 97%   BMI 47.79 kg/m²     Physical Exam  Vitals and nursing note reviewed.   Constitutional:       General: She is not in acute distress.     Appearance: She is not ill-appearing.   HENT:      Head: Normocephalic and atraumatic.      Mouth/Throat:      Mouth: Mucous membranes are moist.   Eyes:      Conjunctiva/sclera: Conjunctivae normal.   Cardiovascular:      Rate and Rhythm: Normal rate and regular rhythm.      Heart sounds: Normal heart sounds.      Comments: Clear yellow fluid drainage from left  Pulmonary:      Effort: Pulmonary effort is normal.      Breath sounds: Normal breath sounds.   Abdominal:      General: There is distension.      Comments: Edema soft tissue    Musculoskeletal:      Left lower le+ Edema present.   Skin:     General: Skin is warm.   Neurological:      Mental Status: She is alert.   Psychiatric:         Mood and Affect: Mood normal.         Thought Content: Thought content normal.         Judgment: Judgment normal.         Assessment/Plan   Problem List Items Addressed This Visit          Circulatory    Congestive heart failure (CMS/HCC) - Primary    Relevant Orders    CBC    Comprehensive Metabolic Panel     Other Visit Diagnoses       Edema, unspecified type        Relevant Orders    CBC    Thyroid Stimulating Hormone    Lymphedema        Relevant Orders    " Referral to Physical Therapy    Abdominal distension        Relevant Orders    XR abdomen 1 view    SOB (shortness of breath)        Relevant Medications    albuterol 90 mcg/actuation inhaler    Other Relevant Orders    Pulmonary function testing    Follow Up In [facility] - PCP

## 2023-04-27 ENCOUNTER — TELEPHONE (OUTPATIENT)
Dept: PRIMARY CARE | Facility: CLINIC | Age: 58
End: 2023-04-27
Payer: COMMERCIAL

## 2023-05-30 ENCOUNTER — DOCUMENTATION (OUTPATIENT)
Dept: PRIMARY CARE | Facility: CLINIC | Age: 58
End: 2023-05-30

## 2023-05-31 ENCOUNTER — PATIENT OUTREACH (OUTPATIENT)
Dept: PRIMARY CARE | Facility: CLINIC | Age: 58
End: 2023-05-31
Payer: COMMERCIAL

## 2023-05-31 NOTE — PROGRESS NOTES
Discharge Facility: [facility]  Discharge Diagnosis: acute on chronic systolic heart failure  Admission Date:5/11/2023  Discharge Date: 5/29/2023    PCP Appointment Date:NONE  Specialist Appointment Date: GI 6/12/2023  Hospital Encounter and Summary: Linked

## 2023-06-22 ENCOUNTER — PATIENT OUTREACH (OUTPATIENT)
Dept: PRIMARY CARE | Facility: CLINIC | Age: 58
End: 2023-06-22
Payer: COMMERCIAL

## 2023-06-22 NOTE — PROGRESS NOTES
Unable to reach patient for call back 14 days after  after patient's hospital discharge. No follow up appointment with PCP.   LVM with call back number for patient to call if needed   If no voicemail available call attempts x 2 were made to contact the patient to assist with any questions or concerns patient may have.

## 2023-07-18 ENCOUNTER — PATIENT OUTREACH (OUTPATIENT)
Dept: PRIMARY CARE | Facility: CLINIC | Age: 58
End: 2023-07-18

## 2023-07-18 ENCOUNTER — APPOINTMENT (OUTPATIENT)
Dept: PRIMARY CARE | Facility: CLINIC | Age: 58
End: 2023-07-18
Payer: COMMERCIAL

## 2023-07-19 NOTE — PROGRESS NOTES
Discharge Facility:[facility]  Discharge Diagnosis:Acute on chronic left systolic heart failure  Admission Date:7/3/2023  Discharge Date: 7/17/2023    PCP Appointment Date:NONE  Specialist Appointment Date: NONE  Hospital Encounter and Summary: Linked

## 2023-07-25 DIAGNOSIS — I50.9 CONGESTIVE HEART FAILURE, UNSPECIFIED HF CHRONICITY, UNSPECIFIED HEART FAILURE TYPE (MULTI): Primary | ICD-10-CM

## 2023-07-27 ENCOUNTER — TELEMEDICINE (OUTPATIENT)
Dept: PHARMACY | Facility: HOSPITAL | Age: 58
End: 2023-07-27
Payer: COMMERCIAL

## 2023-07-27 DIAGNOSIS — I50.9 CONGESTIVE HEART FAILURE, UNSPECIFIED HF CHRONICITY, UNSPECIFIED HEART FAILURE TYPE (MULTI): ICD-10-CM

## 2023-07-27 RX ORDER — POTASSIUM CHLORIDE 750 MG/1
10 TABLET, FILM COATED, EXTENDED RELEASE ORAL DAILY
COMMUNITY

## 2023-07-27 RX ORDER — CALCIUM CARBONATE 300MG(750)
400 TABLET,CHEWABLE ORAL DAILY
COMMUNITY

## 2023-07-27 RX ORDER — PANTOPRAZOLE SODIUM 40 MG/1
40 TABLET, DELAYED RELEASE ORAL 2 TIMES DAILY
COMMUNITY
End: 2024-05-01 | Stop reason: WASHOUT

## 2023-07-27 RX ORDER — BUMETANIDE 2 MG/1
TABLET ORAL 2 TIMES DAILY
COMMUNITY

## 2023-07-27 RX ORDER — METOPROLOL SUCCINATE 100 MG/1
100 TABLET, EXTENDED RELEASE ORAL DAILY
COMMUNITY

## 2023-07-27 RX ORDER — GABAPENTIN 300 MG/1
300 CAPSULE ORAL 2 TIMES DAILY
COMMUNITY
End: 2023-10-16 | Stop reason: ALTCHOICE

## 2023-07-27 RX ORDER — SACUBITRIL AND VALSARTAN 24; 26 MG/1; MG/1
1 TABLET, FILM COATED ORAL 2 TIMES DAILY
COMMUNITY
End: 2023-10-06 | Stop reason: SDUPTHER

## 2023-07-27 NOTE — PROGRESS NOTES
Pharmacy Post-Discharge Visit  Lisa Guerrero is a 58 y.o. female was referred to Clinical Pharmacy Team to complete a post-discharge medication optimization and monitoring visit.  The patient was referred for their Congestive Heart Failure. Medication reconciliation completed. Denies adverse effects or issues with medication. C/o nighttime urination with bedtime Bumex dose. Using pillbox, no issue with adherence.     Admission Date: 7/3/23  Discharge Date: 7/17/23    CHF Assessment  -Most recent ejection fraction: 40-45%  -Weight changes?: No. Daily weight checks.  -Denies chest pain or SOB    Guideline-Directed Medical Therapy:  -ARNI: Yes, describe: Entresto 24/265mg BID  -Beta Blocker: Yes, Toprol XL 100mg QD  -MRA: No  -SGLT2i: No    Secondary Prevention:  -The 10-year ASCVD risk score (Matthias SAUNDERS, et al., 2019) is: 5.8%    Values used to calculate the score:      Age: 58 years      Sex: Female      Is Non- : No      Diabetic: No      Tobacco smoker: Yes      Systolic Blood Pressure: 94 mmHg      Is BP treated: Yes      HDL Cholesterol: 33.1 mg/dL      Total Cholesterol: 156 mg/dL   -Aspirin 81mg? no  -Statin?: No - due for lipid panel  -HTN?: Yes reported /88. Denies s/s of high or low BP    -Quit smoking 3 months ago    Referring Provider: Nathalia Mehta MD    Subjective   Allergies   Allergen Reactions    Ceftriaxone Hives    Codeine Hives    Hydrogen Peroxide Unknown    Penicillins Hives    Sulfamethoxazole-Trimethoprim Hives       [id number] - [place], [place] - [address] [facility]  [address] [facility] [address]  Phone: [phone] Fax: [phone]      Social History     Social History Narrative    Not on file      Notable Medication changes following discharge:  Start: Entresto, Toprol XL, Bumex  Stop: Sprionolactone, Valsartan, Farxiga, Metolazone    Objective   LAB  Lab Results   Component Value Date    BILITOT 2.1 (H) 07/12/2023    CALCIUM  CANCELED 07/18/2023    CO2 CANCELED 07/18/2023    CL CANCELED 07/18/2023    CREATININE CANCELED 07/18/2023    GLUCOSE CANCELED 07/18/2023    ALKPHOS 76 07/12/2023    K CANCELED 07/18/2023    PROT 6.3 (L) 07/12/2023    NA CANCELED 07/18/2023    AST 7 (L) 07/12/2023    ALT 8 07/12/2023    BUN CANCELED 07/18/2023    ANIONGAP CANCELED 07/18/2023    MG CANCELED 07/18/2023    PHOS CANCELED 07/18/2023    ALBUMIN CANCELED 07/18/2023    GFRF CANCELED 07/18/2023    GFRMALE CANCELED 07/18/2023     Lab Results   Component Value Date    TRIG CANCELED 07/03/2023    CHOL CANCELED 07/03/2023    HDL CANCELED 07/03/2023     Lab Results   Component Value Date    HGBA1C CANCELED 07/03/2023       Current Outpatient Medications on File Prior to Visit   Medication Sig Dispense Refill    albuterol 90 mcg/actuation inhaler Inhale 2 puffs every 4 hours if needed for shortness of breath. 18 g 2    ammonium lactate (Lac-Hydrin) 12 % lotion Apply topically if needed.      bumetanide (Bumex) 2 mg tablet Take by mouth 2 times a day.      fluticasone (Flonase) 50 mcg/actuation nasal spray Administer 2 sprays into affected nostril(s) once daily.      gabapentin (Neurontin) 300 mg capsule Take 1 capsule (300 mg) by mouth 2 times a day.      ketoconazole (NIZOral) 2 % cream Apply topically once daily. 30 g 0    magnesium oxide (Mag-Ox) 400 mg tablet Take 1 tablet (400 mg) by mouth once daily.      metoprolol succinate XL (Toprol-XL) 100 mg 24 hr tablet Take 1 tablet (100 mg) by mouth once daily. Do not crush or chew.      pantoprazole (ProtoNix) 40 mg EC tablet Take 1 tablet (40 mg) by mouth 2 times a day. Do not crush, chew, or split.      potassium chloride CR 10 mEq ER tablet Take 1 tablet (10 mEq) by mouth once daily. Do not crush, chew, or split.      sacubitriL-valsartan (Entresto) 24-26 mg tablet Take 1 tablet by mouth 2 times a day.      sertraline (Zoloft) 100 mg tablet Take 1 tablet (100 mg) by mouth once daily.      Xarelto 20 mg tablet  Take 1 tablet (20 mg) by mouth once daily.      [DISCONTINUED] furosemide (Lasix) 40 mg tablet Take 1 tablet (40 mg) by mouth 2 times a day.      [DISCONTINUED] hydrOXYzine HCL (Atarax) 25 mg tablet Take 1 tablet (25 mg) by mouth 4 times a day.      [DISCONTINUED] spironolactone (Aldactone) 25 mg tablet Take 1 tablet (25 mg) by mouth once daily.       No current facility-administered medications on file prior to visit.        Assessment/Plan   Problem List Items Addressed This Visit       Congestive heart failure (CMS/HCC)    Relevant Medications    metoprolol succinate XL (Toprol-XL) 100 mg 24 hr tablet    sacubitriL-valsartan (Entresto) 24-26 mg tablet       - Continue all meds under the continuation of care with the referring provider and clinical pharmacy team. No refills needed at this time.  - Take second dose of Bumex before dinner to prevent nocturia   - Recommend updated labs including CMP and lipid panel   - Discuss re-initiation of Farxiga with cardiologist (appt 7/31)    Follow up with pharmD: as needed    Sadia Jimenez, Lisy     Verbal consent to manage patient's drug therapy was obtained from [the patient and/or an individual authorized to act on behalf of a patient]. They were informed they may decline to participate or withdraw from participation in pharmacy services at any time.

## 2023-07-31 LAB
ANION GAP IN SER/PLAS: 15 MMOL/L (ref 10–20)
BASOPHILS (10*3/UL) IN BLOOD BY AUTOMATED COUNT: 0.04 X10E9/L (ref 0–0.1)
BASOPHILS/100 LEUKOCYTES IN BLOOD BY AUTOMATED COUNT: 0.6 % (ref 0–2)
CALCIUM (MG/DL) IN SER/PLAS: 9 MG/DL (ref 8.6–10.3)
CARBON DIOXIDE, TOTAL (MMOL/L) IN SER/PLAS: 24 MMOL/L (ref 21–32)
CHLORIDE (MMOL/L) IN SER/PLAS: 104 MMOL/L (ref 98–107)
CREATININE (MG/DL) IN SER/PLAS: 1.09 MG/DL (ref 0.5–1.05)
EOSINOPHILS (10*3/UL) IN BLOOD BY AUTOMATED COUNT: 0.33 X10E9/L (ref 0–0.7)
EOSINOPHILS/100 LEUKOCYTES IN BLOOD BY AUTOMATED COUNT: 4.7 % (ref 0–6)
ERYTHROCYTE DISTRIBUTION WIDTH (RATIO) BY AUTOMATED COUNT: 18.8 % (ref 11.5–14.5)
ERYTHROCYTE MEAN CORPUSCULAR HEMOGLOBIN CONCENTRATION (G/DL) BY AUTOMATED: 31.1 G/DL (ref 32–36)
ERYTHROCYTE MEAN CORPUSCULAR VOLUME (FL) BY AUTOMATED COUNT: 92 FL (ref 80–100)
ERYTHROCYTES (10*6/UL) IN BLOOD BY AUTOMATED COUNT: 4.95 X10E12/L (ref 4–5.2)
GFR FEMALE: 58 ML/MIN/1.73M2
GLUCOSE (MG/DL) IN SER/PLAS: 117 MG/DL (ref 74–99)
HEMATOCRIT (%) IN BLOOD BY AUTOMATED COUNT: 45.4 % (ref 36–46)
HEMOGLOBIN (G/DL) IN BLOOD: 14.1 G/DL (ref 12–16)
IMMATURE GRANULOCYTES/100 LEUKOCYTES IN BLOOD BY AUTOMATED COUNT: 0.3 % (ref 0–0.9)
LEUKOCYTES (10*3/UL) IN BLOOD BY AUTOMATED COUNT: 7 X10E9/L (ref 4.4–11.3)
LYMPHOCYTES (10*3/UL) IN BLOOD BY AUTOMATED COUNT: 0.98 X10E9/L (ref 1.2–4.8)
LYMPHOCYTES/100 LEUKOCYTES IN BLOOD BY AUTOMATED COUNT: 14 % (ref 13–44)
MAGNESIUM (MG/DL) IN SER/PLAS: 1.47 MG/DL (ref 1.6–2.4)
MONOCYTES (10*3/UL) IN BLOOD BY AUTOMATED COUNT: 0.45 X10E9/L (ref 0.1–1)
MONOCYTES/100 LEUKOCYTES IN BLOOD BY AUTOMATED COUNT: 6.4 % (ref 2–10)
NATRIURETIC PEPTIDE B (PG/ML) IN SER/PLAS: 1052 PG/ML (ref 0–99)
NEUTROPHILS (10*3/UL) IN BLOOD BY AUTOMATED COUNT: 5.16 X10E9/L (ref 1.2–7.7)
NEUTROPHILS/100 LEUKOCYTES IN BLOOD BY AUTOMATED COUNT: 74 % (ref 40–80)
NRBC (PER 100 WBCS) BY AUTOMATED COUNT: 0 /100 WBC (ref 0–0)
PLATELETS (10*3/UL) IN BLOOD AUTOMATED COUNT: 183 X10E9/L (ref 150–450)
POTASSIUM (MMOL/L) IN SER/PLAS: 4.1 MMOL/L (ref 3.5–5.3)
SODIUM (MMOL/L) IN SER/PLAS: 139 MMOL/L (ref 136–145)
UREA NITROGEN (MG/DL) IN SER/PLAS: 37 MG/DL (ref 6–23)

## 2023-08-16 ENCOUNTER — PATIENT OUTREACH (OUTPATIENT)
Dept: PRIMARY CARE | Facility: CLINIC | Age: 58
End: 2023-08-16
Payer: COMMERCIAL

## 2023-08-16 NOTE — PROGRESS NOTES
Unable to reach patient for call back 14 DAYS after patient's hospital follow up. No follow up appointment with PCP.   LVM with call back number for patient to call if needed

## 2023-08-24 ENCOUNTER — OFFICE VISIT (OUTPATIENT)
Dept: PRIMARY CARE | Facility: CLINIC | Age: 58
End: 2023-08-24
Payer: COMMERCIAL

## 2023-08-24 VITALS
TEMPERATURE: 97.5 F | SYSTOLIC BLOOD PRESSURE: 118 MMHG | BODY MASS INDEX: 41.32 KG/M2 | HEIGHT: 64 IN | DIASTOLIC BLOOD PRESSURE: 76 MMHG | HEART RATE: 78 BPM | WEIGHT: 242 LBS | RESPIRATION RATE: 16 BRPM

## 2023-08-24 DIAGNOSIS — Z12.11 COLON CANCER SCREENING: ICD-10-CM

## 2023-08-24 DIAGNOSIS — I50.9 CONGESTIVE HEART FAILURE, UNSPECIFIED HF CHRONICITY, UNSPECIFIED HEART FAILURE TYPE (MULTI): ICD-10-CM

## 2023-08-24 DIAGNOSIS — R20.0 NUMBNESS AND TINGLING IN BOTH HANDS: ICD-10-CM

## 2023-08-24 DIAGNOSIS — R20.2 NUMBNESS AND TINGLING IN BOTH HANDS: ICD-10-CM

## 2023-08-24 DIAGNOSIS — Z00.00 ROUTINE GENERAL MEDICAL EXAMINATION AT A HEALTH CARE FACILITY: ICD-10-CM

## 2023-08-24 DIAGNOSIS — M25.511 ACUTE PAIN OF RIGHT SHOULDER: Primary | ICD-10-CM

## 2023-08-24 DIAGNOSIS — M51.9 LUMBAR DISC DISEASE: ICD-10-CM

## 2023-08-24 PROBLEM — I50.33 ACUTE ON CHRONIC DIASTOLIC HEART FAILURE WITH PRESERVED EJECTION FRACTION (MULTI): Status: RESOLVED | Noted: 2023-04-26 | Resolved: 2023-08-24

## 2023-08-24 PROBLEM — M46.1 ARTHRITIS OF SACROILIAC JOINT OF BOTH SIDES: Status: ACTIVE | Noted: 2023-08-24

## 2023-08-24 PROBLEM — M47.816 LUMBAR FACET ARTHROPATHY: Status: ACTIVE | Noted: 2023-08-24

## 2023-08-24 PROBLEM — M47.818 ARTHRITIS OF SACROILIAC JOINT OF BOTH SIDES: Status: ACTIVE | Noted: 2023-08-24

## 2023-08-24 PROBLEM — M54.16 CHRONIC LEFT-SIDED LUMBAR RADICULOPATHY: Status: ACTIVE | Noted: 2023-08-24

## 2023-08-24 PROCEDURE — 3074F SYST BP LT 130 MM HG: CPT | Performed by: FAMILY MEDICINE

## 2023-08-24 PROCEDURE — 3078F DIAST BP <80 MM HG: CPT | Performed by: FAMILY MEDICINE

## 2023-08-24 PROCEDURE — 99214 OFFICE O/P EST MOD 30 MIN: CPT | Performed by: FAMILY MEDICINE

## 2023-08-24 PROCEDURE — 3008F BODY MASS INDEX DOCD: CPT | Performed by: FAMILY MEDICINE

## 2023-08-24 NOTE — PROGRESS NOTES
"Subjective   Patient ID: Lisa Guerrero is a 58 y.o. female who presents for Follow-up (Patient here for F/U for hospital at [facility] on June 21-23-7-3-23 then transferred to [facility]. CHF and swelling, SOB. ), Congestive Heart Failure, and Leg Swelling.    EF 40-45%  seeing cardiologist October  Has pain legs and numbness hands   Has swelling in legs when stands and gets pain in back so is limited with movement   Can't bend over   has numbness hands   Hx lumbar arthritis with radiation down left leg   Problems with right shoulder since in hospital  On CPAP and is helping    Reviewed CT and labs hospital          Review of Systems    Objective   /76   Pulse 78   Temp 36.4 °C (97.5 °F)   Resp 16   Ht 1.626 m (5' 4\")   Wt 110 kg (242 lb)   BMI 41.54 kg/m²     Physical Exam  Vitals and nursing note reviewed.   Constitutional:       General: She is not in acute distress.     Appearance: She is not ill-appearing.   HENT:      Head: Normocephalic and atraumatic.      Mouth/Throat:      Mouth: Mucous membranes are moist.   Eyes:      Conjunctiva/sclera: Conjunctivae normal.   Cardiovascular:      Rate and Rhythm: Normal rate and regular rhythm.      Heart sounds: Normal heart sounds.   Pulmonary:      Effort: Pulmonary effort is normal.      Breath sounds: Normal breath sounds.   Skin:     General: Skin is warm.   Neurological:      Mental Status: She is alert.   Psychiatric:         Mood and Affect: Mood normal.         Thought Content: Thought content normal.         Judgment: Judgment normal.         Assessment/Plan   Problem List Items Addressed This Visit       Congestive heart failure (CMS/HCC)    Lumbar disc disease     Other Visit Diagnoses       Acute pain of right shoulder    -  Primary    Relevant Orders    XR shoulder right 2+ views    Referral to Sports Medicine    Numbness and tingling in both hands        Relevant Orders    EMG & nerve conduction    Routine general medical examination at a [facility] " care facility        Relevant Orders    Follow Up In [facility] - PCP - Health Maintenance    Colon cancer screening        Relevant Orders    Cologuard® colon cancer screening

## 2023-08-30 DIAGNOSIS — F32.A DEPRESSION, UNSPECIFIED: ICD-10-CM

## 2023-08-30 RX ORDER — SERTRALINE HYDROCHLORIDE 100 MG/1
100 TABLET, FILM COATED ORAL DAILY
Qty: 90 TABLET | Refills: 1 | Status: SHIPPED | OUTPATIENT
Start: 2023-08-30 | End: 2024-02-19

## 2023-09-15 ENCOUNTER — PATIENT OUTREACH (OUTPATIENT)
Dept: PRIMARY CARE | Facility: CLINIC | Age: 58
End: 2023-09-15
Payer: COMMERCIAL

## 2023-09-15 NOTE — PROGRESS NOTES
Unable to reach patient for one month post discharge follow up call. LVM with call back number for patient to call if needed

## 2023-10-06 PROBLEM — D68.51 FACTOR V LEIDEN (MULTI): Status: ACTIVE | Noted: 2023-05-12

## 2023-10-06 PROBLEM — I50.23 ACUTE ON CHRONIC HFREF (HEART FAILURE WITH REDUCED EJECTION FRACTION) (MULTI): Status: ACTIVE | Noted: 2023-05-11

## 2023-10-06 PROBLEM — R06.00 DYSPNEA: Status: ACTIVE | Noted: 2018-01-26

## 2023-10-06 PROBLEM — J18.9 PNEUMONIA: Status: ACTIVE | Noted: 2022-12-12

## 2023-10-06 PROBLEM — M16.10 ARTHRITIS OF HIP: Status: ACTIVE | Noted: 2018-07-19

## 2023-10-06 PROBLEM — I50.43: Status: ACTIVE | Noted: 2023-10-06

## 2023-10-06 PROBLEM — Z98.890 HISTORY OF REPAIR OF HIP JOINT: Status: ACTIVE | Noted: 2023-05-11

## 2023-10-06 PROBLEM — E66.01 MORBID OBESITY WITH BMI OF 60.0-69.9, ADULT (MULTI): Status: ACTIVE | Noted: 2023-10-06

## 2023-10-06 PROBLEM — R13.19 ESOPHAGEAL DYSPHAGIA: Status: ACTIVE | Noted: 2023-05-24

## 2023-10-06 PROBLEM — Z86.718 PERSONAL HISTORY OF DVT (DEEP VEIN THROMBOSIS): Status: ACTIVE | Noted: 2023-05-11

## 2023-10-06 PROBLEM — R57.9 SHOCK (MULTI): Status: ACTIVE | Noted: 2023-10-06

## 2023-10-06 PROBLEM — B33.24 VIRAL CARDIOMYOPATHY (MULTI): Status: ACTIVE | Noted: 2018-02-01

## 2023-10-06 PROBLEM — E87.20 LACTIC ACIDOSIS: Status: ACTIVE | Noted: 2023-10-06

## 2023-10-06 PROBLEM — D68.2 FACTOR V DEFICIENCY (MULTI): Status: ACTIVE | Noted: 2023-10-06

## 2023-10-06 PROBLEM — M16.12 OSTEOARTHRITIS OF LEFT HIP: Status: ACTIVE | Noted: 2019-12-30

## 2023-10-06 PROBLEM — I34.0 MITRAL REGURGITATION: Status: ACTIVE | Noted: 2023-05-12

## 2023-10-06 PROBLEM — K76.0 FATTY LIVER: Status: ACTIVE | Noted: 2023-05-11

## 2023-10-06 PROBLEM — I50.22 CHRONIC SYSTOLIC HEART FAILURE (MULTI): Status: ACTIVE | Noted: 2023-10-06

## 2023-10-06 PROBLEM — I82.90 VENOUS THROMBOSIS: Status: ACTIVE | Noted: 2023-04-26

## 2023-10-06 PROBLEM — I50.810 RIGHT HEART FAILURE (MULTI): Status: ACTIVE | Noted: 2023-10-06

## 2023-10-06 PROBLEM — D50.9 IRON DEFICIENCY ANEMIA: Status: ACTIVE | Noted: 2023-05-14

## 2023-10-06 PROBLEM — N28.0 RENAL INFARCTION (MULTI): Status: ACTIVE | Noted: 2018-07-01

## 2023-10-06 PROBLEM — E87.5 HYPERKALEMIA: Status: ACTIVE | Noted: 2023-10-06

## 2023-10-06 PROBLEM — E66.813 OBESITY, CLASS III, BMI 40-49.9 (MORBID OBESITY): Status: ACTIVE | Noted: 2018-07-19

## 2023-10-06 PROBLEM — M19.90 ARTHRITIS: Status: ACTIVE | Noted: 2023-05-11

## 2023-10-06 PROBLEM — G47.30 SLEEP APNEA, UNSPECIFIED: Status: ACTIVE | Noted: 2023-10-06

## 2023-10-06 PROBLEM — R94.31 ABNORMAL EKG: Status: ACTIVE | Noted: 2023-06-25

## 2023-10-06 PROBLEM — R16.0 LIVER MASS: Status: ACTIVE | Noted: 2023-05-11

## 2023-10-06 PROBLEM — E66.01 OBESITY, CLASS III, BMI 40-49.9 (MORBID OBESITY) (MULTI): Status: ACTIVE | Noted: 2018-07-19

## 2023-10-06 PROBLEM — R09.02 HYPOXIA: Status: ACTIVE | Noted: 2023-05-11

## 2023-10-06 PROBLEM — R06.09 DYSPNEA ON MINIMAL EXERTION: Status: ACTIVE | Noted: 2023-10-06

## 2023-10-06 PROBLEM — I95.9 HYPOTENSION: Status: ACTIVE | Noted: 2023-10-06

## 2023-10-06 PROBLEM — I47.29 NONSUSTAINED VENTRICULAR TACHYCARDIA (MULTI): Status: ACTIVE | Noted: 2023-10-06

## 2023-10-06 PROBLEM — M70.62 TROCHANTERIC BURSITIS OF LEFT HIP: Status: ACTIVE | Noted: 2023-05-11

## 2023-10-06 PROBLEM — J81.1 PULMONARY EDEMA (HHS-HCC): Status: ACTIVE | Noted: 2023-10-06

## 2023-10-06 PROBLEM — N18.9 CHRONIC KIDNEY DISEASE: Status: ACTIVE | Noted: 2023-10-06

## 2023-10-06 PROBLEM — E87.6 HYPOKALEMIA: Status: ACTIVE | Noted: 2023-10-06

## 2023-10-06 RX ORDER — CARVEDILOL 12.5 MG/1
1 TABLET ORAL 2 TIMES DAILY
COMMUNITY
Start: 2023-01-12 | End: 2023-10-16

## 2023-10-06 RX ORDER — LISINOPRIL 10 MG/1
1 TABLET ORAL DAILY
COMMUNITY
Start: 2022-12-04 | End: 2023-10-16

## 2023-10-06 RX ORDER — TORSEMIDE 20 MG/1
20 TABLET ORAL DAILY
COMMUNITY
Start: 2023-05-29 | End: 2023-10-16

## 2023-10-06 RX ORDER — LISINOPRIL 2.5 MG/1
1 TABLET ORAL DAILY
COMMUNITY
Start: 2023-01-20 | End: 2023-10-16

## 2023-10-06 RX ORDER — VALSARTAN 40 MG/1
TABLET ORAL
COMMUNITY
Start: 2023-05-29 | End: 2023-10-16

## 2023-10-06 RX ORDER — PREDNISONE 20 MG/1
2 TABLET ORAL DAILY
COMMUNITY
Start: 2022-12-14 | End: 2023-10-16

## 2023-10-06 RX ORDER — ALBUTEROL SULFATE 90 UG/1
1-2 AEROSOL, METERED RESPIRATORY (INHALATION) EVERY 4 HOURS PRN
COMMUNITY

## 2023-10-06 RX ORDER — SUCRALFATE 1 G/1
TABLET ORAL
COMMUNITY
Start: 2023-05-29 | End: 2024-05-01 | Stop reason: WASHOUT

## 2023-10-16 ENCOUNTER — OFFICE VISIT (OUTPATIENT)
Dept: CARDIOLOGY | Facility: CLINIC | Age: 58
End: 2023-10-16
Payer: COMMERCIAL

## 2023-10-16 VITALS
SYSTOLIC BLOOD PRESSURE: 122 MMHG | HEIGHT: 64 IN | DIASTOLIC BLOOD PRESSURE: 78 MMHG | BODY MASS INDEX: 40.63 KG/M2 | WEIGHT: 238 LBS | HEART RATE: 101 BPM | OXYGEN SATURATION: 97 %

## 2023-10-16 DIAGNOSIS — I50.22 CHRONIC SYSTOLIC HEART FAILURE (MULTI): Primary | ICD-10-CM

## 2023-10-16 DIAGNOSIS — D68.2 FACTOR V DEFICIENCY (MULTI): ICD-10-CM

## 2023-10-16 DIAGNOSIS — G47.30 SLEEP APNEA, UNSPECIFIED: ICD-10-CM

## 2023-10-16 DIAGNOSIS — E83.42 HYPOMAGNESEMIA: ICD-10-CM

## 2023-10-16 PROBLEM — I50.9 CONGESTIVE HEART FAILURE (MULTI): Status: RESOLVED | Noted: 2023-04-26 | Resolved: 2023-10-16

## 2023-10-16 PROBLEM — I42.9 CARDIOMYOPATHY (MULTI): Status: RESOLVED | Noted: 2023-04-26 | Resolved: 2023-10-16

## 2023-10-16 PROBLEM — D68.51 FACTOR V LEIDEN (MULTI): Status: RESOLVED | Noted: 2023-05-12 | Resolved: 2023-10-16

## 2023-10-16 PROBLEM — I50.23 ACUTE ON CHRONIC HFREF (HEART FAILURE WITH REDUCED EJECTION FRACTION) (MULTI): Status: RESOLVED | Noted: 2023-05-11 | Resolved: 2023-10-16

## 2023-10-16 PROBLEM — R57.9 SHOCK (MULTI): Status: RESOLVED | Noted: 2023-10-06 | Resolved: 2023-10-16

## 2023-10-16 PROBLEM — B33.24 VIRAL CARDIOMYOPATHY (MULTI): Status: RESOLVED | Noted: 2018-02-01 | Resolved: 2023-10-16

## 2023-10-16 PROBLEM — I50.810 RIGHT HEART FAILURE (MULTI): Status: RESOLVED | Noted: 2023-10-06 | Resolved: 2023-10-16

## 2023-10-16 PROBLEM — I50.9 ACUTE ON CHRONIC CONGESTIVE HEART FAILURE (MULTI): Status: RESOLVED | Noted: 2023-08-24 | Resolved: 2023-10-16

## 2023-10-16 PROCEDURE — 3008F BODY MASS INDEX DOCD: CPT | Performed by: INTERNAL MEDICINE

## 2023-10-16 PROCEDURE — 99214 OFFICE O/P EST MOD 30 MIN: CPT | Performed by: INTERNAL MEDICINE

## 2023-10-16 PROCEDURE — 3078F DIAST BP <80 MM HG: CPT | Performed by: INTERNAL MEDICINE

## 2023-10-16 PROCEDURE — 3074F SYST BP LT 130 MM HG: CPT | Performed by: INTERNAL MEDICINE

## 2023-10-16 ASSESSMENT — ENCOUNTER SYMPTOMS
DEPRESSION: 0
OCCASIONAL FEELINGS OF UNSTEADINESS: 1
LOSS OF SENSATION IN FEET: 0

## 2023-10-16 ASSESSMENT — PAIN SCALES - GENERAL: PAINLEVEL: 0-NO PAIN

## 2023-10-16 NOTE — PROGRESS NOTES
Chief Complaint:   No chief complaint on file.     History Of Present Illness:    Lisa Guerrero is a 58 y.o. 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    She really has been doing well.  She slipped yesterday and had some Swedish meatballs but otherwise has not been eating salt at all.  Her weight has been stable.  Her legs have not been swelling.     She was first diagnosed with a nonischemic dilated cardiomyopathy in January 2018. She was admitted to the hospital and underwent catheterization which reportedly demonstrated no significant coronary disease. EF was 20%. She was initiated on heart failure regimen and was asked to wear a LifeVest. LV function improved with aggressive medical therapy and no ICD was required. She followed with  for some time before he retired.     She was diagnosed with factor V Leiden deficiency and is remained on Xarelto 20 mg ever since. She has not had a recurrent thromboembolic events since that time. She denies any bleeding issues with Xarelto.     Her daughter, Kash, used to work in our office.     Right heart catheterization 7/5/2023: RA 38, RV 73/35, wedge 52, PA 72/33 (50). CO/CI 3.4/1.5     Echocardiogram 6/22/2023: EF 40 to 45%. Moderately enlarged RV with moderately reduced systolic function. Moderate left atrial enlargement. Moderate MR and TR. RVSP 44 mmHg.     Echocardiogram 12/12/2022: Dilated left ventricle (LVIDD 7.3 cm) with EF 55 to 60%. Grade 1 diastolic dysfunction. Moderate left atrial enlargement. Moderate MR.      Echocardiogram 7/21/2018: EF 30 to 40% with grade 3 diastolic dysfunction. Severe left atrial enlargement. Severe MR. Moderate TR.      Past Medical History:  She has a past medical history of Acute on chronic  "diastolic heart failure with preserved ejection fraction (CMS/HCC) (04/26/2023) and Hepatomegaly, not elsewhere classified.    Past Surgical History:  She has a past surgical history that includes Other surgical history (12/02/2019) and Other surgical history (12/02/2019).      Social History:  She reports that she has been smoking cigarettes. She has never used smokeless tobacco. She reports that she does not currently use alcohol. She reports that she does not use drugs.    Family History:  Family History   Problem Relation Name Age of Onset    Aneurysm Mother      No Known Problems Father          Allergies:  Ceftriaxone, Codeine, Hydrogen peroxide, Latex, Penicillins, Sacubitril-valsartan, and Sulfamethoxazole-trimethoprim    Outpatient Medications:  Current Outpatient Medications   Medication Instructions    albuterol 90 mcg/actuation inhaler 1-2 puffs, inhalation, Every 4 hours PRN    ammonium lactate (Lac-Hydrin) 12 % lotion Topical, As needed    bumetanide (Bumex) 2 mg tablet oral, 2 times daily    fluticasone (Flonase) 50 mcg/actuation nasal spray 2 sprays, nasal, Daily    gabapentin (NEURONTIN) 300 mg, oral, 2 times daily    ketoconazole (NIZOral) 2 % cream Topical, Daily    magnesium oxide (MAG-OX) 400 mg, oral, Daily    metoprolol succinate XL (TOPROL-XL) 100 mg, oral, Daily, Do not crush or chew.    pantoprazole (PROTONIX) 40 mg, oral, 2 times daily, Do not crush, chew, or split.    potassium chloride CR 10 mEq ER tablet 10 mEq, oral, Daily, Do not crush, chew, or split.    sacubitriL-valsartan (Entresto) 24-26 mg tablet TAKE 1 TABLET BY MOUTH TWO TIMES A DAY    sertraline (ZOLOFT) 100 mg, oral, Daily    sucralfate (Carafate) 1 gram tablet Take 1 tablet by mouth before meals and at bedtime.    TURMERIC ORAL 1 capsule, oral, Daily    Xarelto 20 mg, oral, Daily       Last Recorded Vitals:  Visit Vitals  /78 (BP Location: Right arm, Patient Position: Sitting)   Pulse 101   Ht 1.626 m (5' 4\")   Wt 108 " kg (238 lb)   SpO2 97%   BMI 40.85 kg/m²   Smoking Status Some Days   BSA 2.21 m²        Physical Exam:  In general: alert and in no acute distress.   HEENT: Mucous membranes moist, carotid upstrokes normal with no bruits. JVP is normal. No cervical lymphadenopathy. Cranial nerves II-XII grossly intact.  Pulmonary: Clear to auscultation bilaterally.  Cardiovascular: S1,S2, regular. No appreciable murmurs, rubs or gallops.   Abdomen: Striations with mild edema.  Lower extremities: Warm. 2+ distal pulses. No significant edema.  Skin: warm and dry. No obvious rashes visualized.  Psychiatric: Oriented to person, place and time. No obvious agitation.     Last Labs:  CBC -  Lab Results   Component Value Date    WBC 7.0 07/31/2023    HGB 14.1 07/31/2023    HCT 45.4 07/31/2023    MCV 92 07/31/2023     07/31/2023       CMP -  Lab Results   Component Value Date    CALCIUM 9.0 07/31/2023    PHOS CANCELED 07/18/2023    PROT 6.3 (L) 07/12/2023    ALBUMIN CANCELED 07/18/2023    AST 7 (L) 07/12/2023    ALT 8 07/12/2023    ALKPHOS 76 07/12/2023    BILITOT 2.1 (H) 07/12/2023       LIPID PANEL -   Lab Results   Component Value Date    CHOL CANCELED 07/03/2023    TRIG CANCELED 07/03/2023    HDL CANCELED 07/03/2023    CHHDL CANCELED 07/03/2023    LDLF CANCELED 07/03/2023    VLDL CANCELED 07/03/2023    NHDL CANCELED 07/03/2023       RENAL FUNCTION PANEL -   Lab Results   Component Value Date    GLUCOSE 117 (H) 07/31/2023     07/31/2023    K 4.1 07/31/2023     07/31/2023    CO2 24 07/31/2023    ANIONGAP 15 07/31/2023    BUN 37 (H) 07/31/2023    CREATININE 1.09 (H) 07/31/2023    GFRMALE CANCELED 07/18/2023    CALCIUM 9.0 07/31/2023    PHOS CANCELED 07/18/2023    ALBUMIN CANCELED 07/18/2023        Lab Results   Component Value Date    BNP 1,052 (H) 07/31/2023    HGBA1C CANCELED 07/03/2023    HGBA1C 7.1 (H) 05/22/2023           Assessment/Plan   1) chronic systolic heart failure: Really doing well.  Continue with her  current medicines.  We will check blood work again including a BMP and magnesium level.     2) factor V Leiden deficiency with history of jugular vein thrombosis: Continue Xarelto.     3) HERNANDO: Patient has an outpatient sleep study ordered.     4) hypomagnesemia: Magnesium level ordered.    5) headaches: Unlikely secondary to medications.  Continue to observe.    6) follow-up: 6 months or sooner if needed       Nathan Feldman MD

## 2023-10-17 ENCOUNTER — PATIENT OUTREACH (OUTPATIENT)
Dept: PRIMARY CARE | Facility: CLINIC | Age: 58
End: 2023-10-17
Payer: COMMERCIAL

## 2023-10-19 ENCOUNTER — PATIENT OUTREACH (OUTPATIENT)
Dept: RADIOLOGY | Facility: HOSPITAL | Age: 58
End: 2023-10-19
Payer: COMMERCIAL

## 2024-02-01 DIAGNOSIS — I42.9 CARDIOMYOPATHY, UNSPECIFIED (MULTI): ICD-10-CM

## 2024-02-02 RX ORDER — SACUBITRIL AND VALSARTAN 24; 26 MG/1; MG/1
1 TABLET, FILM COATED ORAL 2 TIMES DAILY
Qty: 60 TABLET | Refills: 11 | Status: SHIPPED | OUTPATIENT
Start: 2024-02-02

## 2024-02-12 ENCOUNTER — OFFICE VISIT (OUTPATIENT)
Dept: ORTHOPEDIC SURGERY | Facility: CLINIC | Age: 59
End: 2024-02-12
Payer: COMMERCIAL

## 2024-02-12 ENCOUNTER — HOSPITAL ENCOUNTER (OUTPATIENT)
Dept: RADIOLOGY | Facility: CLINIC | Age: 59
Discharge: HOME | End: 2024-02-12
Payer: COMMERCIAL

## 2024-02-12 DIAGNOSIS — M25.551 RIGHT HIP PAIN: ICD-10-CM

## 2024-02-12 DIAGNOSIS — M54.50 LOW BACK PAIN, UNSPECIFIED BACK PAIN LATERALITY, UNSPECIFIED CHRONICITY, UNSPECIFIED WHETHER SCIATICA PRESENT: ICD-10-CM

## 2024-02-12 PROCEDURE — 3008F BODY MASS INDEX DOCD: CPT | Performed by: STUDENT IN AN ORGANIZED HEALTH CARE EDUCATION/TRAINING PROGRAM

## 2024-02-12 PROCEDURE — 73502 X-RAY EXAM HIP UNI 2-3 VIEWS: CPT | Mod: RT

## 2024-02-12 PROCEDURE — 99203 OFFICE O/P NEW LOW 30 MIN: CPT | Performed by: STUDENT IN AN ORGANIZED HEALTH CARE EDUCATION/TRAINING PROGRAM

## 2024-02-12 PROCEDURE — 99213 OFFICE O/P EST LOW 20 MIN: CPT | Mod: 25,27 | Performed by: STUDENT IN AN ORGANIZED HEALTH CARE EDUCATION/TRAINING PROGRAM

## 2024-02-12 PROCEDURE — 73502 X-RAY EXAM HIP UNI 2-3 VIEWS: CPT | Mod: RIGHT SIDE | Performed by: STUDENT IN AN ORGANIZED HEALTH CARE EDUCATION/TRAINING PROGRAM

## 2024-02-12 NOTE — PROGRESS NOTES
Chief Complaint   Patient presents with    Right Hip - New Patient Visit, Pain     X-rays today       HPI  58-year-old female presents today for evaluation of her right hip.  Patient states that she has longstanding hip and posterior buttock pain for quite some time.  Does also have prior history of sciatica.  States that the pain is about the posterior lateral aspect hip and occasionally radiates down the leg.  Some days better than others.  Has not attempted any therapy for this yet.  Ambulates independently.    Past Medical History:   Diagnosis Date    Acute on chronic diastolic heart failure with preserved ejection fraction (CMS/HCC) 04/26/2023    Hepatomegaly, not elsewhere classified     Liver mass       Past Surgical History:   Procedure Laterality Date    OTHER SURGICAL HISTORY  12/02/2019    Tubal ligation    OTHER SURGICAL HISTORY  12/02/2019    Cholecystectomy        Allergies   Allergen Reactions    Ceftriaxone Hives    Codeine Hives    Hydrogen Peroxide Unknown    Latex Other    Penicillins Hives    Sacubitril-Valsartan Other     headache, dizziness, nightmares, dry mouth    Sulfamethoxazole-Trimethoprim Hives        Physical exam    General: Alert and oriented to place, person, and time.  No acute distress and breathing comfortably; pleasant and cooperative with the examination.  HEENT: Head is normocephalic and atraumatic.  Neck: Supple, no visible swelling.  Cardiovascular: Good perfusion to the affected extremity.  Lungs: No audible wheezing or labored breathing.  Abdomen: Nondistended  HEME/Lymph : No visible abnormalities bilateral lower extremity    Extremity:  Right hip:  Normal gait  Negative Trendelenburg sign  Skin healthy and intact  No tenderness to palpation over lumbar spine  No tenderness over greater trochanter  Exquisite tenderness palpation about the SI joint.  Positive Dana's point  Full forward flexion  Symmetric motion, no loss of internal rotation   No weakness with resisted  hip flexion, abduction or adduction     Negative flexion/adduction/internal rotation  Negative flexion/abduction/external rotation test  Negative straight leg raise  Neurovascular exam normal distally    Diagnostics:  X-rays collected.  See formal dictated read    Procedure:  Procedures    Assessment:  58-year-old female with SI joint pain, hip and back pain    Treatment plan:  The natural history of the condition and its associated treatment alternatives including surgical and nonsurgical options were discussed with the patient at length.  Will give patient a prescription for physical therapy  Will provide referral for SI joint injection  Discussed x-ray findings today  Follow-up in 2 months time  If fails to improve recommend x-rays of the lumbar spine  All of the patient's questions were answered.

## 2024-02-19 DIAGNOSIS — F32.A DEPRESSION, UNSPECIFIED: ICD-10-CM

## 2024-02-19 RX ORDER — SERTRALINE HYDROCHLORIDE 100 MG/1
100 TABLET, FILM COATED ORAL DAILY
Qty: 90 TABLET | Refills: 1 | Status: SHIPPED | OUTPATIENT
Start: 2024-02-19

## 2024-02-26 ENCOUNTER — PATIENT OUTREACH (OUTPATIENT)
Dept: CARE COORDINATION | Facility: CLINIC | Age: 59
End: 2024-02-26
Payer: COMMERCIAL

## 2024-02-27 ENCOUNTER — APPOINTMENT (OUTPATIENT)
Dept: PRIMARY CARE | Facility: CLINIC | Age: 59
End: 2024-02-27
Payer: COMMERCIAL

## 2024-02-28 ENCOUNTER — PATIENT OUTREACH (OUTPATIENT)
Dept: CARE COORDINATION | Facility: CLINIC | Age: 59
End: 2024-02-28
Payer: COMMERCIAL

## 2024-04-12 ENCOUNTER — APPOINTMENT (OUTPATIENT)
Dept: ORTHOPEDIC SURGERY | Facility: CLINIC | Age: 59
End: 2024-04-12
Payer: COMMERCIAL

## 2024-04-22 ENCOUNTER — HOSPITAL ENCOUNTER (OUTPATIENT)
Dept: RADIOLOGY | Facility: HOSPITAL | Age: 59
Discharge: HOME | End: 2024-04-22
Payer: COMMERCIAL

## 2024-04-22 ENCOUNTER — OFFICE VISIT (OUTPATIENT)
Dept: PRIMARY CARE | Facility: CLINIC | Age: 59
End: 2024-04-22
Payer: COMMERCIAL

## 2024-04-22 ENCOUNTER — HOSPITAL ENCOUNTER (OUTPATIENT)
Dept: CARDIOLOGY | Facility: HOSPITAL | Age: 59
Discharge: HOME | End: 2024-04-22
Payer: COMMERCIAL

## 2024-04-22 ENCOUNTER — LAB (OUTPATIENT)
Dept: LAB | Facility: LAB | Age: 59
End: 2024-04-22
Payer: COMMERCIAL

## 2024-04-22 VITALS
OXYGEN SATURATION: 95 % | TEMPERATURE: 97.7 F | DIASTOLIC BLOOD PRESSURE: 75 MMHG | WEIGHT: 250.6 LBS | SYSTOLIC BLOOD PRESSURE: 117 MMHG | HEART RATE: 71 BPM | BODY MASS INDEX: 43.02 KG/M2

## 2024-04-22 DIAGNOSIS — M79.672 ACUTE FOOT PAIN, LEFT: ICD-10-CM

## 2024-04-22 DIAGNOSIS — M25.572 ACUTE LEFT ANKLE PAIN: ICD-10-CM

## 2024-04-22 DIAGNOSIS — I50.22 CHRONIC SYSTOLIC HEART FAILURE (MULTI): ICD-10-CM

## 2024-04-22 DIAGNOSIS — M79.89 OTHER SPECIFIED SOFT TISSUE DISORDERS: ICD-10-CM

## 2024-04-22 DIAGNOSIS — I50.9 CONGESTIVE HEART FAILURE, UNSPECIFIED HF CHRONICITY, UNSPECIFIED HEART FAILURE TYPE (MULTI): ICD-10-CM

## 2024-04-22 DIAGNOSIS — M79.662 PAIN AND SWELLING OF LOWER LEG, LEFT: Primary | ICD-10-CM

## 2024-04-22 DIAGNOSIS — M79.89 PAIN AND SWELLING OF LOWER LEG, LEFT: Primary | ICD-10-CM

## 2024-04-22 DIAGNOSIS — R60.9 EDEMA, UNSPECIFIED TYPE: ICD-10-CM

## 2024-04-22 DIAGNOSIS — E83.42 HYPOMAGNESEMIA: ICD-10-CM

## 2024-04-22 LAB
ALBUMIN SERPL BCP-MCNC: 4.2 G/DL (ref 3.4–5)
ALP SERPL-CCNC: 77 U/L (ref 33–136)
ALT SERPL W P-5'-P-CCNC: 10 U/L (ref 7–45)
ANION GAP SERPL CALC-SCNC: 16 MMOL/L (ref 10–20)
AST SERPL W P-5'-P-CCNC: 6 U/L (ref 9–39)
BILIRUB SERPL-MCNC: 0.5 MG/DL (ref 0–1.2)
BUN SERPL-MCNC: 20 MG/DL (ref 6–23)
CALCIUM SERPL-MCNC: 9.4 MG/DL (ref 8.6–10.3)
CHLORIDE SERPL-SCNC: 104 MMOL/L (ref 98–107)
CO2 SERPL-SCNC: 22 MMOL/L (ref 21–32)
CREAT SERPL-MCNC: 0.87 MG/DL (ref 0.5–1.05)
EGFRCR SERPLBLD CKD-EPI 2021: 76 ML/MIN/1.73M*2
ERYTHROCYTE [DISTWIDTH] IN BLOOD BY AUTOMATED COUNT: 15 % (ref 11.5–14.5)
GLUCOSE SERPL-MCNC: 111 MG/DL (ref 74–99)
HCT VFR BLD AUTO: 47.9 % (ref 36–46)
HGB BLD-MCNC: 15.7 G/DL (ref 12–16)
MAGNESIUM SERPL-MCNC: 1.76 MG/DL (ref 1.6–2.4)
MCH RBC QN AUTO: 28.5 PG (ref 26–34)
MCHC RBC AUTO-ENTMCNC: 32.8 G/DL (ref 32–36)
MCV RBC AUTO: 87 FL (ref 80–100)
NRBC BLD-RTO: 0 /100 WBCS (ref 0–0)
PLATELET # BLD AUTO: 182 X10*3/UL (ref 150–450)
POTASSIUM SERPL-SCNC: 4.2 MMOL/L (ref 3.5–5.3)
PROT SERPL-MCNC: 7.2 G/DL (ref 6.4–8.2)
RBC # BLD AUTO: 5.5 X10*6/UL (ref 4–5.2)
SODIUM SERPL-SCNC: 138 MMOL/L (ref 136–145)
TSH SERPL-ACNC: 0.48 MIU/L (ref 0.44–3.98)
URATE SERPL-MCNC: 7.7 MG/DL (ref 2.3–6.7)
WBC # BLD AUTO: 10.2 X10*3/UL (ref 4.4–11.3)

## 2024-04-22 PROCEDURE — 84550 ASSAY OF BLOOD/URIC ACID: CPT

## 2024-04-22 PROCEDURE — 36415 COLL VENOUS BLD VENIPUNCTURE: CPT

## 2024-04-22 PROCEDURE — 93971 EXTREMITY STUDY: CPT | Performed by: INTERNAL MEDICINE

## 2024-04-22 PROCEDURE — 83735 ASSAY OF MAGNESIUM: CPT

## 2024-04-22 PROCEDURE — 3078F DIAST BP <80 MM HG: CPT | Performed by: NURSE PRACTITIONER

## 2024-04-22 PROCEDURE — 85027 COMPLETE CBC AUTOMATED: CPT

## 2024-04-22 PROCEDURE — 99213 OFFICE O/P EST LOW 20 MIN: CPT | Performed by: NURSE PRACTITIONER

## 2024-04-22 PROCEDURE — 84443 ASSAY THYROID STIM HORMONE: CPT

## 2024-04-22 PROCEDURE — 80053 COMPREHEN METABOLIC PANEL: CPT

## 2024-04-22 PROCEDURE — 3074F SYST BP LT 130 MM HG: CPT | Performed by: NURSE PRACTITIONER

## 2024-04-22 PROCEDURE — 3008F BODY MASS INDEX DOCD: CPT | Performed by: NURSE PRACTITIONER

## 2024-04-22 PROCEDURE — 73630 X-RAY EXAM OF FOOT: CPT | Mod: LT

## 2024-04-22 PROCEDURE — 73630 X-RAY EXAM OF FOOT: CPT | Mod: LEFT SIDE | Performed by: RADIOLOGY

## 2024-04-22 PROCEDURE — 93971 EXTREMITY STUDY: CPT

## 2024-04-22 RX ORDER — METHYLPREDNISOLONE 4 MG/1
TABLET ORAL
Qty: 21 TABLET | Refills: 0 | Status: SHIPPED | OUTPATIENT
Start: 2024-04-22 | End: 2024-05-04 | Stop reason: HOSPADM

## 2024-04-22 ASSESSMENT — ENCOUNTER SYMPTOMS
JOINT SWELLING: 1
FATIGUE: 0
ACTIVITY CHANGE: 0
DIARRHEA: 0
SLEEP DISTURBANCE: 1
FEVER: 0
VOMITING: 0
CONSTIPATION: 0
MYALGIAS: 1
CHILLS: 0
APPETITE CHANGE: 0
NAUSEA: 0

## 2024-04-22 NOTE — ASSESSMENT & PLAN NOTE
Blood work and xray order placed; Will follow up on results  Will assist in scheduling a lower leg Ultrasound  Rx for steroid taper sent in; Start in the morning with food and take as directed  Follow up with PCP if not improving and testing is negative  ER for any SOB, difficulty breathing, or worsening of symptoms

## 2024-04-22 NOTE — PROGRESS NOTES
Subjective   Patient ID: Lisa Guerrero is a 59 y.o. female who presents for Gout (Possibley/Took 2 prednisone and motrin).    Swelling of foot/ankle/lower leg  Painful  2/10 sitting in office  10/10 pain this morning upon waking  Unable to wear shoes  No recent travel  No dietary changes    Happened approx 1.5 months  Pain went away  Swelling had never gone away    Pt on xarlto daily; Hx of blood clots    OTC- took 20mg of prednisone/ motrin / ice       Review of Systems   Constitutional:  Negative for activity change, appetite change, chills, fatigue and fever.   Gastrointestinal:  Negative for constipation, diarrhea, nausea and vomiting.   Musculoskeletal:  Positive for joint swelling and myalgias.   Psychiatric/Behavioral:  Positive for sleep disturbance.        Objective   /75   Pulse 71   Temp 36.5 °C (97.7 °F)   Wt 114 kg (250 lb 9.6 oz)   SpO2 95%   BMI 43.02 kg/m²     Physical Exam  Vitals reviewed.   Constitutional:       Appearance: Normal appearance.   HENT:      Head: Normocephalic.   Cardiovascular:      Rate and Rhythm: Normal rate and regular rhythm.      Pulses: Normal pulses.      Heart sounds: Normal heart sounds.   Pulmonary:      Effort: Pulmonary effort is normal.      Breath sounds: Normal breath sounds.   Musculoskeletal:         General: Swelling present.      Cervical back: Normal range of motion and neck supple.      Right lower leg: Normal.      Left lower leg: Swelling present. Edema present.      Right ankle: Normal.      Left ankle: Swelling present. No deformity. Normal range of motion. Normal pulse.      Right foot: Normal.      Left foot: Normal range of motion. Swelling present.   Skin:     General: Skin is warm.      Capillary Refill: Capillary refill takes less than 2 seconds.   Neurological:      General: No focal deficit present.      Mental Status: She is alert and oriented to person, place, and time.   Psychiatric:         Mood and Affect: Mood normal.          Behavior: Behavior normal.       Assessment/Plan   Problem List Items Addressed This Visit    None  Visit Diagnoses         Codes    Pain and swelling of lower leg, left    -  Primary M79.662, M79.89    Relevant Medications    methylPREDNISolone (Medrol Dospak) 4 mg tablets    Other Relevant Orders    Uric acid    XR foot left 3+ views    Lower extremity venous duplex left (Completed)

## 2024-04-23 ENCOUNTER — APPOINTMENT (OUTPATIENT)
Dept: RADIOLOGY | Facility: HOSPITAL | Age: 59
End: 2024-04-23
Payer: COMMERCIAL

## 2024-04-23 ENCOUNTER — HOSPITAL ENCOUNTER (EMERGENCY)
Facility: HOSPITAL | Age: 59
Discharge: HOME | End: 2024-04-23
Attending: EMERGENCY MEDICINE
Payer: COMMERCIAL

## 2024-04-23 VITALS
HEIGHT: 64 IN | HEART RATE: 96 BPM | BODY MASS INDEX: 42.34 KG/M2 | RESPIRATION RATE: 16 BRPM | TEMPERATURE: 98.1 F | DIASTOLIC BLOOD PRESSURE: 65 MMHG | WEIGHT: 248 LBS | OXYGEN SATURATION: 95 % | SYSTOLIC BLOOD PRESSURE: 163 MMHG

## 2024-04-23 DIAGNOSIS — S82.002A CLOSED DISPLACED FRACTURE OF LEFT PATELLA, UNSPECIFIED FRACTURE MORPHOLOGY, INITIAL ENCOUNTER: Primary | ICD-10-CM

## 2024-04-23 PROCEDURE — 2500000001 HC RX 250 WO HCPCS SELF ADMINISTERED DRUGS (ALT 637 FOR MEDICARE OP): Performed by: EMERGENCY MEDICINE

## 2024-04-23 PROCEDURE — 73562 X-RAY EXAM OF KNEE 3: CPT | Mod: LEFT SIDE | Performed by: RADIOLOGY

## 2024-04-23 PROCEDURE — 73562 X-RAY EXAM OF KNEE 3: CPT | Mod: LT

## 2024-04-23 PROCEDURE — 99283 EMERGENCY DEPT VISIT LOW MDM: CPT

## 2024-04-23 PROCEDURE — 99284 EMERGENCY DEPT VISIT MOD MDM: CPT | Performed by: EMERGENCY MEDICINE

## 2024-04-23 RX ORDER — OXYCODONE AND ACETAMINOPHEN 5; 325 MG/1; MG/1
1 TABLET ORAL EVERY 8 HOURS PRN
Qty: 9 TABLET | Refills: 0 | Status: SHIPPED | OUTPATIENT
Start: 2024-04-23 | End: 2024-05-04 | Stop reason: HOSPADM

## 2024-04-23 RX ORDER — OXYCODONE AND ACETAMINOPHEN 5; 325 MG/1; MG/1
2 TABLET ORAL ONCE
Status: COMPLETED | OUTPATIENT
Start: 2024-04-23 | End: 2024-04-23

## 2024-04-23 RX ADMIN — OXYCODONE HYDROCHLORIDE AND ACETAMINOPHEN 2 TABLET: 5; 325 TABLET ORAL at 20:05

## 2024-04-23 ASSESSMENT — COLUMBIA-SUICIDE SEVERITY RATING SCALE - C-SSRS
2. HAVE YOU ACTUALLY HAD ANY THOUGHTS OF KILLING YOURSELF?: NO
1. IN THE PAST MONTH, HAVE YOU WISHED YOU WERE DEAD OR WISHED YOU COULD GO TO SLEEP AND NOT WAKE UP?: NO
6. HAVE YOU EVER DONE ANYTHING, STARTED TO DO ANYTHING, OR PREPARED TO DO ANYTHING TO END YOUR LIFE?: NO

## 2024-04-23 ASSESSMENT — PAIN SCALES - GENERAL
PAINLEVEL_OUTOF10: 7
PAINLEVEL_OUTOF10: 7

## 2024-04-23 ASSESSMENT — LIFESTYLE VARIABLES
HAVE YOU EVER FELT YOU SHOULD CUT DOWN ON YOUR DRINKING: NO
EVER FELT BAD OR GUILTY ABOUT YOUR DRINKING: NO
HAVE PEOPLE ANNOYED YOU BY CRITICIZING YOUR DRINKING: NO
TOTAL SCORE: 0
EVER HAD A DRINK FIRST THING IN THE MORNING TO STEADY YOUR NERVES TO GET RID OF A HANGOVER: NO

## 2024-04-23 ASSESSMENT — PAIN - FUNCTIONAL ASSESSMENT: PAIN_FUNCTIONAL_ASSESSMENT: 0-10

## 2024-04-23 NOTE — ED PROVIDER NOTES
HPI   Chief Complaint   Patient presents with    Knee Pain       HPI     59-year-old female patient with history of requiring anticoagulation on Xarelto arriving with significant left knee pain.  She says that she fell while grabbing a basket and fell onto cement with a twisting motion.  She denies loss of consciousness.  She says she has difficulty walking.  She has a prior left hip replacement.  She is currently on Medrol Dosepak.  She has been having difficulty ambulating.  She has a history of gout and surgical history of cholecystectomy.  She endorses cigarette use.  She denies alcohol or drug use.          Linda Coma Scale Score: 15                     Patient History   Past Medical History:   Diagnosis Date    Acute on chronic diastolic heart failure with preserved ejection fraction (Multi) 04/26/2023    Hepatomegaly, not elsewhere classified     Liver mass     Past Surgical History:   Procedure Laterality Date    OTHER SURGICAL HISTORY  12/02/2019    Tubal ligation    OTHER SURGICAL HISTORY  12/02/2019    Cholecystectomy     Family History   Problem Relation Name Age of Onset    Aneurysm Mother      No Known Problems Father       Social History     Tobacco Use    Smoking status: Some Days     Types: Cigarettes    Smokeless tobacco: Never   Substance Use Topics    Alcohol use: Not Currently    Drug use: Never       Physical Exam   ED Triage Vitals [04/23/24 1818]   Temperature Heart Rate Respirations BP   36.7 °C (98.1 °F) 92 18 155/68      Pulse Ox Temp Source Heart Rate Source Patient Position   99 % Temporal Monitor Sitting      BP Location FiO2 (%)     Right arm --       Physical Exam  General: Alert, no acute distress, well developed, Well hydrated  Head: NCAT  Eyes: Clear conjunctiva, EOMI  ENT: Moist mucosa, no lymphadenopathy  Respiratory: Normal respiratory effort. CTAB. Symmetric aeration. No wheezes, rales, or Rhonchi.  CV: Normal rhythm, Normal Rate, pulses present bilaterally  GI: Soft, NT, no  guarding, BS normoactive, No distention, No hernia, No palpable mass.  : no flank tenderness, no cva tenderness  MSK:pain upon hip flexion while attempting to  her leg.  There is decreased range of motion of left hip flexion.  She has suprapatellar tenderness with edema.  There is mild left knee medial and lateral joint line tenderness .  Skin: Warm, c/d/I, bilateral lower extremity discoloration   Neuro: AOx3, facial symmetry,   sensorimotor intact in extremities,   CN intact, Nonfocal neurological exam          ED Course & MDM   Diagnoses as of 04/25/24 0545   Closed displaced fracture of left patella, unspecified fracture morphology, initial encounter       Medical Decision Making       59-year-old female patient with history of requiring anticoagulation on Xarelto arriving with significant left knee pain.  She says that she fell while grabbing a basket and fell onto cement with a twisting motion.  She denies loss of consciousness.  She says she has difficulty walking.  She has a prior left hip replacement.  She is currently on Medrol Dosepak.  She has been having difficulty ambulating.  She has a history of gout.    Exam is significant for bilateral lower extremity discoloration that is chronic.  She says she has had this for 20 years.  She has pain upon hip flexion while attempting to  her leg.  There is decreased range of motion of left hip flexion.  She has suprapatellar tenderness with edema.  There is mild left knee medial and lateral joint line tenderness     Analgesia provided here.  X-ray of the knee was significant for a displaced patella fracture.  Discussed with orthopedics Dr. Sibley who advised that she was placed in a immobilizer and then seen by orthopedics outpatient.  She was given Percocet prescription and told not to take more than 3 pills daily.  She was told to follow-up with her PCP as well as with orthopedics.  She was given return precautions and anticipatory guidance and  was in agreement with the plan.    External Records Reviewed: I reviewed recent and relevant outside records including: prior  Independent Interpretation of Studies: I independently interpreted: As above  Social Determinants Affecting Care: Diagnostic testing considered: As above    I reviewed the case with the attending ER physician. Patient and/or patient´s representative was counseled regarding labs, imaging, likely diagnosis, and plan.     Ed Narvaez MD MS  PGY-1, Emergency Medicine    The above documentation was completed with the use of speech recognition software. It may contain dictation errors secondary to limitations of the software.        Ed Narvaez MD  Resident  04/24/24 3399     Ed Narvaez MD  Resident  04/25/24 0732

## 2024-04-24 ENCOUNTER — TELEPHONE (OUTPATIENT)
Dept: ORTHOPEDIC SURGERY | Facility: CLINIC | Age: 59
End: 2024-04-24

## 2024-04-24 ENCOUNTER — OFFICE VISIT (OUTPATIENT)
Dept: ORTHOPEDIC SURGERY | Facility: CLINIC | Age: 59
End: 2024-04-24
Payer: COMMERCIAL

## 2024-04-24 DIAGNOSIS — S82.002A CLOSED DISPLACED FRACTURE OF LEFT PATELLA, UNSPECIFIED FRACTURE MORPHOLOGY, INITIAL ENCOUNTER: ICD-10-CM

## 2024-04-24 DIAGNOSIS — S82.033A: Primary | ICD-10-CM

## 2024-04-24 DIAGNOSIS — R11.0 NAUSEA: Primary | ICD-10-CM

## 2024-04-24 DIAGNOSIS — R25.2 CRAMP IN LIMB: ICD-10-CM

## 2024-04-24 PROCEDURE — 3008F BODY MASS INDEX DOCD: CPT | Performed by: ORTHOPAEDIC SURGERY

## 2024-04-24 PROCEDURE — 99214 OFFICE O/P EST MOD 30 MIN: CPT | Performed by: ORTHOPAEDIC SURGERY

## 2024-04-24 PROCEDURE — 99214 OFFICE O/P EST MOD 30 MIN: CPT | Mod: 57 | Performed by: ORTHOPAEDIC SURGERY

## 2024-04-24 RX ORDER — OXYCODONE AND ACETAMINOPHEN 5; 325 MG/1; MG/1
1 TABLET ORAL EVERY 8 HOURS PRN
Qty: 20 TABLET | Refills: 0 | Status: CANCELLED | OUTPATIENT
Start: 2024-04-24 | End: 2024-05-01

## 2024-04-24 RX ORDER — OXYCODONE AND ACETAMINOPHEN 5; 325 MG/1; MG/1
1 TABLET ORAL EVERY 6 HOURS PRN
Qty: 28 TABLET | Refills: 0 | Status: SHIPPED | OUTPATIENT
Start: 2024-04-24 | End: 2024-05-04 | Stop reason: HOSPADM

## 2024-04-24 RX ORDER — ONDANSETRON 4 MG/1
8 TABLET, FILM COATED ORAL EVERY 8 HOURS PRN
Qty: 20 TABLET | Refills: 0 | Status: SHIPPED | OUTPATIENT
Start: 2024-04-24 | End: 2024-05-04 | Stop reason: HOSPADM

## 2024-04-24 NOTE — DISCHARGE INSTRUCTIONS
Follow-up with your primary care provider as well as with orthopedics.  Maintain your immobilizer on your left knee until cleared by orthopedics take Percocet as needed for pain not more than 3 pills daily.  Discussed this medication with your pharmacist.  Return to the ER for any persistent pain persistent difficulty walking or other concerning or worsening symptoms.

## 2024-04-24 NOTE — PROGRESS NOTES
History of Present Illness:  Patient presents for evaluation of left patella.  The patient sustained an acute injury and notes pain and swelling.    The pain is sharp in nature, severe, worse with movement and better with rest.    She takes Xarelto, smoking history, vascular disease.  Pain is severe 10 out of 10.    Past Medical History:   Diagnosis Date    Acute on chronic diastolic heart failure with preserved ejection fraction (Multi) 04/26/2023    Hepatomegaly, not elsewhere classified     Liver mass     Past Surgical History:   Procedure Laterality Date    OTHER SURGICAL HISTORY  12/02/2019    Tubal ligation    OTHER SURGICAL HISTORY  12/02/2019    Cholecystectomy       Current Outpatient Medications:     albuterol 90 mcg/actuation inhaler, Inhale 1-2 puffs every 4 hours if needed., Disp: , Rfl:     ammonium lactate (Lac-Hydrin) 12 % lotion, Apply topically if needed., Disp: , Rfl:     bumetanide (Bumex) 2 mg tablet, Take by mouth 2 times a day., Disp: , Rfl:     Entresto 24-26 mg tablet, Take 1 (ONE) tablet twice daily, Disp: 60 tablet, Rfl: 11    fluticasone (Flonase) 50 mcg/actuation nasal spray, Administer 2 sprays into affected nostril(s) once daily., Disp: , Rfl:     ketoconazole (NIZOral) 2 % cream, Apply topically once daily., Disp: 30 g, Rfl: 0    magnesium oxide (Mag-Ox) 400 mg tablet, Take 1 tablet (400 mg) by mouth once daily., Disp: , Rfl:     methylPREDNISolone (Medrol Dospak) 4 mg tablets, Take as directed on package., Disp: 21 tablet, Rfl: 0    metoprolol succinate XL (Toprol-XL) 100 mg 24 hr tablet, Take 1 tablet (100 mg) by mouth once daily. Do not crush or chew., Disp: , Rfl:     oxyCODONE-acetaminophen (Percocet) 5-325 mg tablet, Take 1 tablet by mouth every 8 hours if needed for severe pain (7 - 10) for up to 3 days., Disp: 9 tablet, Rfl: 0    pantoprazole (ProtoNix) 40 mg EC tablet, Take 1 tablet (40 mg) by mouth 2 times a day. Do not crush, chew, or split., Disp: , Rfl:     potassium  chloride CR 10 mEq ER tablet, Take 1 tablet (10 mEq) by mouth once daily. Do not crush, chew, or split., Disp: , Rfl:     sertraline (Zoloft) 100 mg tablet, TAKE 1 TABLET BY MOUTH DAILY, Disp: 90 tablet, Rfl: 1    sucralfate (Carafate) 1 gram tablet, Take 1 tablet by mouth before meals and at bedtime., Disp: , Rfl:     TURMERIC ORAL, Take 1 capsule by mouth once daily., Disp: , Rfl:     Xarelto 20 mg tablet, Take 1 tablet (20 mg) by mouth once daily., Disp: , Rfl:   No current facility-administered medications for this visit.    Review of Systems   GENERAL: Negative for malaise, significant weight loss, fever  MUSCULOSKELETAL: see HPI  NEURO:  Negative    Physical Examination:  Left knee:  Skin healthy and intact  Swelling noted , fracture blister noted anteriorly  Tenderness: Diffuse  Intact flexion and extension of digits  Neurovascular exam normal distally  2+ dorsalis pedis pulse and good cap refill    Imaging:  Comminuted displaced patella fracture    Assessment:  Patient with a comminuted left patella fracture    Plan:  Discussed surgical management including the risks and benefits of surgery.  Reviewed the course of treatment including pre-op evaluation, anesthesia,   Based on the fracture blister we will need to schedule surgery in approximately 9 to 10 days.  Discussed rest ice elevation.  Will have to hold his Xarelto before we reviewed with her vascular disease concern for infection nonunion etc.  Does have a palpable pulse today    Follow up:  1 week    I have personally reviewed the [facility] report for this patient. This report is scanned into  the electronic medical record. I have considered the risks of abuse, dependence, addiction, and diversion. They currently report a pain of 10.

## 2024-04-25 ENCOUNTER — TELEPHONE (OUTPATIENT)
Dept: CARDIOLOGY | Facility: CLINIC | Age: 59
End: 2024-04-25
Payer: COMMERCIAL

## 2024-04-25 RX ORDER — CELECOXIB 50 MG/1
200 CAPSULE ORAL ONCE
Status: CANCELLED | OUTPATIENT
Start: 2024-04-25 | End: 2024-04-25

## 2024-04-25 RX ORDER — ACETAMINOPHEN 325 MG/1
650 TABLET ORAL ONCE
Status: CANCELLED | OUTPATIENT
Start: 2024-04-25 | End: 2024-04-25

## 2024-04-25 RX ORDER — SODIUM CHLORIDE, SODIUM LACTATE, POTASSIUM CHLORIDE, CALCIUM CHLORIDE 600; 310; 30; 20 MG/100ML; MG/100ML; MG/100ML; MG/100ML
100 INJECTION, SOLUTION INTRAVENOUS CONTINUOUS
Status: CANCELLED | OUTPATIENT
Start: 2024-04-25

## 2024-04-25 NOTE — H&P
History Of Present Illness  Lisa Guerrero is a 59 y.o. female presenting with left knee pain and loss of motion.     Past Medical History  She has a past medical history of Acute on chronic diastolic heart failure with preserved ejection fraction (Multi) (04/26/2023) and Hepatomegaly, not elsewhere classified.    Surgical History  She has a past surgical history that includes Other surgical history (12/02/2019) and Other surgical history (12/02/2019).     Social History  She reports that she has been smoking cigarettes. She has never used smokeless tobacco. She reports that she does not currently use alcohol. She reports that she does not use drugs.    Family History  Family History   Problem Relation Name Age of Onset    Aneurysm Mother      No Known Problems Father          Allergies  Ceftriaxone, Codeine, Hydrogen peroxide, Latex, Penicillins, Sacubitril-valsartan, and Sulfamethoxazole-trimethoprim    Review of Systems   CONSTITUTIONAL: Denies weight loss, fever and chills.    - HEENT: Denies changes in vision and hearing.    - RESPIRATORY: Denies SOB and cough.    - CV: Denies palpitations and CP.    - GI: Denies abdominal pain, nausea, vomiting and diarrhea.    - : Denies dysuria and urinary frequency.    - MSK: See physical exam findings     - SKIN: Denies rash and pruritus.    - NEUROLOGICAL: Denies headache and syncope.    - PSYCHIATRIC: Denies recent changes in mood. Denies anxiety and depression.      Physical Exam  - GENERAL: Alert and oriented x 3. No acute distress. Well-nourished.    - EYES: EOMI. Anicteric.    - HENT: Moist mucous membranes. No scleral icterus. No cervical lymphadenopathy.    - LUNGS: Clear to auscultation bilaterally. No accessory muscle use.    - CARDIOVASCULAR: Regular rate and rhythm. No murmur. No JVD.    - ABDOMEN: Soft, non-tender and non-distended. No palpable masses.    - EXTREMITIES: Swelling and blistering of left knee     - NEUROLOGIC: No focal neurological deficits. CN  II-XII grossly intact, but not individually tested.    - PSYCHIATRIC: Cooperative. Appropriate mood and affect.      Last Recorded Vitals  There were no vitals taken for this visit.    Relevant Results      Scheduled medications    Continuous medications    PRN medications    No results found for this or any previous visit (from the past 24 hour(s)).    Assessment/Plan   Principal Problem:    Unspecified fracture of left patella, initial encounter for closed fracture      Discussed left patella ORIF, agreeable.        I spent 10 minutes in the professional and overall care of this patient.      Estiven Goodrich PA-C

## 2024-04-25 NOTE — TELEPHONE ENCOUNTER
Telephone encounter 4/24/24 regarding pain medication and anti-nausea medication after appointment same-day, patella fracture.     Estiven Goodrich PA-C

## 2024-04-25 NOTE — TELEPHONE ENCOUNTER
----- Message from Nathan Feldman MD sent at 4/23/2024  6:29 PM EDT -----  Please let the patient know that her magnesium is normal.  No changes needed.  Follow-up as scheduled

## 2024-04-26 ENCOUNTER — APPOINTMENT (OUTPATIENT)
Dept: RADIOLOGY | Facility: CLINIC | Age: 59
End: 2024-04-26
Payer: COMMERCIAL

## 2024-04-29 ENCOUNTER — PATIENT OUTREACH (OUTPATIENT)
Dept: PRIMARY CARE | Facility: CLINIC | Age: 59
End: 2024-04-29

## 2024-04-29 ENCOUNTER — APPOINTMENT (OUTPATIENT)
Dept: CARDIOLOGY | Facility: CLINIC | Age: 59
End: 2024-04-29
Payer: COMMERCIAL

## 2024-04-29 NOTE — PROGRESS NOTES
This RN EUSEBIO received a referral from [facility] Care Manager. This RN EUSEBIO researched Lisa's chart. Determined that she is qualified for Chronic care management. Reached out to her insurance, [facility]. It is a commercial plan. Spoke to Marilyn, call reference number [id number]. Her plan requires a $25.00/month copay and then CCM is covered at 100% afterwards.     Reached out to Lisa, introduced myself and the chronic care management services. Explained her coverage and copay requirement. At this time Lisa declines opting into services due to cost requirement and she is pending surgery for her knee cap. Explained that if she changes her mind at anytime she can reach out or ask Nelia to refer to the services.     She is scheduled for surgery on Friday and then has a follow up appointment with Nelia on 5/24/24. Explained in the meantime I will send an informational packet and my business card to her to ensure she has my contact information.     Thanked her for her time. Also updated the O care manager as well.

## 2024-04-30 ENCOUNTER — APPOINTMENT (OUTPATIENT)
Dept: VASCULAR MEDICINE | Facility: CLINIC | Age: 59
End: 2024-04-30
Payer: COMMERCIAL

## 2024-04-30 ENCOUNTER — ANCILLARY PROCEDURE (OUTPATIENT)
Dept: CARDIOLOGY | Facility: CLINIC | Age: 59
End: 2024-04-30
Payer: COMMERCIAL

## 2024-04-30 ENCOUNTER — APPOINTMENT (OUTPATIENT)
Dept: CARDIOLOGY | Facility: CLINIC | Age: 59
End: 2024-04-30
Payer: COMMERCIAL

## 2024-04-30 DIAGNOSIS — I73.9 PERIPHERAL VASCULAR DISEASE, UNSPECIFIED (CMS-HCC): ICD-10-CM

## 2024-04-30 DIAGNOSIS — R25.2 CRAMP IN LIMB: ICD-10-CM

## 2024-04-30 PROCEDURE — 93922 UPR/L XTREMITY ART 2 LEVELS: CPT | Performed by: INTERNAL MEDICINE

## 2024-04-30 PROCEDURE — 93922 UPR/L XTREMITY ART 2 LEVELS: CPT

## 2024-05-01 ENCOUNTER — OFFICE VISIT (OUTPATIENT)
Dept: ORTHOPEDIC SURGERY | Facility: CLINIC | Age: 59
End: 2024-05-01
Payer: COMMERCIAL

## 2024-05-01 DIAGNOSIS — M25.562 ACUTE PAIN OF LEFT KNEE: Primary | ICD-10-CM

## 2024-05-01 PROCEDURE — 3008F BODY MASS INDEX DOCD: CPT | Performed by: ORTHOPAEDIC SURGERY

## 2024-05-01 PROCEDURE — 99213 OFFICE O/P EST LOW 20 MIN: CPT | Mod: 57 | Performed by: ORTHOPAEDIC SURGERY

## 2024-05-01 PROCEDURE — 99214 OFFICE O/P EST MOD 30 MIN: CPT | Performed by: ORTHOPAEDIC SURGERY

## 2024-05-01 NOTE — PROGRESS NOTES
History of Present Illness:   Patient returns today for evaluation of her left knee she has a significantly displaced patella fracture.  She notes the pain is improved.  Had a vascular studies done yesterday.    Review of Systems   GENERAL: Negative for malaise, significant weight loss, fever  MUSCULOSKELETAL: see HPI  NEURO:  Negative    Physical Examination:  Blisters ruptured in the knee  Significant swelling in the leg  Tolerates gentle motion, negative Lorri test  Normal neurovascular exam distally    Imaging:  Deferred    Assessment:   Patient status post left displaced patella fracture    Plan:  We reviewed with the patient that the incision can be made lateral to the blister now that it is ruptured.  We discussed wound care.  Based on the swelling and venous insufficiency and smoking high concern for infection and wound issues but with a significantly displaced patella fracture and extensor lag would limit her function significantly and do anticipate very poor prognosis without surgical intervention, ORIF versus partial patellectomy me.    We discussed holding Xarelto for only 1 day, risk of DVT PE etc.    Patient amenable to this treatment plan.

## 2024-05-01 NOTE — PREPROCEDURE INSTRUCTIONS
Reviewed medical history and current medications. Aware to hold Xarelto prior to procedure and take metoprolol morning of procedure. NPO after midnight. Patient verbalized understanding.

## 2024-05-02 ENCOUNTER — APPOINTMENT (OUTPATIENT)
Dept: VASCULAR MEDICINE | Facility: CLINIC | Age: 59
End: 2024-05-02
Payer: COMMERCIAL

## 2024-05-02 ENCOUNTER — ANESTHESIA EVENT (OUTPATIENT)
Dept: OPERATING ROOM | Facility: HOSPITAL | Age: 59
End: 2024-05-02
Payer: COMMERCIAL

## 2024-05-03 ENCOUNTER — HOSPITAL ENCOUNTER (OUTPATIENT)
Facility: HOSPITAL | Age: 59
Setting detail: OBSERVATION
Discharge: HOME | End: 2024-05-04
Attending: ORTHOPAEDIC SURGERY | Admitting: ORTHOPAEDIC SURGERY
Payer: COMMERCIAL

## 2024-05-03 ENCOUNTER — APPOINTMENT (OUTPATIENT)
Dept: RADIOLOGY | Facility: HOSPITAL | Age: 59
End: 2024-05-03
Payer: COMMERCIAL

## 2024-05-03 ENCOUNTER — ANESTHESIA (OUTPATIENT)
Dept: OPERATING ROOM | Facility: HOSPITAL | Age: 59
End: 2024-05-03
Payer: COMMERCIAL

## 2024-05-03 DIAGNOSIS — Z87.81 HISTORY OF PATELLAR FRACTURE: ICD-10-CM

## 2024-05-03 DIAGNOSIS — S82.002A UNSPECIFIED FRACTURE OF LEFT PATELLA, INITIAL ENCOUNTER FOR CLOSED FRACTURE: Primary | ICD-10-CM

## 2024-05-03 PROBLEM — G47.33 OSA (OBSTRUCTIVE SLEEP APNEA): Status: ACTIVE | Noted: 2023-10-06

## 2024-05-03 PROBLEM — E66.813 CLASS 3 SEVERE OBESITY IN ADULT: Status: ACTIVE | Noted: 2023-10-06

## 2024-05-03 PROCEDURE — G0378 HOSPITAL OBSERVATION PER HR: HCPCS

## 2024-05-03 PROCEDURE — 96376 TX/PRO/DX INJ SAME DRUG ADON: CPT | Mod: 59

## 2024-05-03 PROCEDURE — 96365 THER/PROPH/DIAG IV INF INIT: CPT | Mod: 59

## 2024-05-03 PROCEDURE — 2500000002 HC RX 250 W HCPCS SELF ADMINISTERED DRUGS (ALT 637 FOR MEDICARE OP, ALT 636 FOR OP/ED): Performed by: STUDENT IN AN ORGANIZED HEALTH CARE EDUCATION/TRAINING PROGRAM

## 2024-05-03 PROCEDURE — 2780000003 HC OR 278 NO HCPCS: Performed by: ORTHOPAEDIC SURGERY

## 2024-05-03 PROCEDURE — 2500000005 HC RX 250 GENERAL PHARMACY W/O HCPCS: Performed by: ORTHOPAEDIC SURGERY

## 2024-05-03 PROCEDURE — 27524 TREAT KNEECAP FRACTURE: CPT | Performed by: ORTHOPAEDIC SURGERY

## 2024-05-03 PROCEDURE — 2500000005 HC RX 250 GENERAL PHARMACY W/O HCPCS: Performed by: ANESTHESIOLOGY

## 2024-05-03 PROCEDURE — A4217 STERILE WATER/SALINE, 500 ML: HCPCS | Performed by: ORTHOPAEDIC SURGERY

## 2024-05-03 PROCEDURE — 7100000001 HC RECOVERY ROOM TIME - INITIAL BASE CHARGE: Performed by: ORTHOPAEDIC SURGERY

## 2024-05-03 PROCEDURE — 2500000004 HC RX 250 GENERAL PHARMACY W/ HCPCS (ALT 636 FOR OP/ED): Performed by: ANESTHESIOLOGY

## 2024-05-03 PROCEDURE — 2720000007 HC OR 272 NO HCPCS: Performed by: ORTHOPAEDIC SURGERY

## 2024-05-03 PROCEDURE — 2500000002 HC RX 250 W HCPCS SELF ADMINISTERED DRUGS (ALT 637 FOR MEDICARE OP, ALT 636 FOR OP/ED): Performed by: ANESTHESIOLOGY

## 2024-05-03 PROCEDURE — 2500000001 HC RX 250 WO HCPCS SELF ADMINISTERED DRUGS (ALT 637 FOR MEDICARE OP): Performed by: STUDENT IN AN ORGANIZED HEALTH CARE EDUCATION/TRAINING PROGRAM

## 2024-05-03 PROCEDURE — 3700000001 HC GENERAL ANESTHESIA TIME - INITIAL BASE CHARGE: Performed by: ORTHOPAEDIC SURGERY

## 2024-05-03 PROCEDURE — 2500000004 HC RX 250 GENERAL PHARMACY W/ HCPCS (ALT 636 FOR OP/ED): Performed by: STUDENT IN AN ORGANIZED HEALTH CARE EDUCATION/TRAINING PROGRAM

## 2024-05-03 PROCEDURE — A6213 FOAM DRG >16<=48 SQ IN W/BDR: HCPCS | Performed by: ORTHOPAEDIC SURGERY

## 2024-05-03 PROCEDURE — 7100000002 HC RECOVERY ROOM TIME - EACH INCREMENTAL 1 MINUTE: Performed by: ORTHOPAEDIC SURGERY

## 2024-05-03 PROCEDURE — 3600000003 HC OR TIME - INITIAL BASE CHARGE - PROCEDURE LEVEL THREE: Performed by: ORTHOPAEDIC SURGERY

## 2024-05-03 PROCEDURE — 2500000001 HC RX 250 WO HCPCS SELF ADMINISTERED DRUGS (ALT 637 FOR MEDICARE OP): Performed by: ANESTHESIOLOGY

## 2024-05-03 PROCEDURE — 2500000001 HC RX 250 WO HCPCS SELF ADMINISTERED DRUGS (ALT 637 FOR MEDICARE OP): Performed by: ORTHOPAEDIC SURGERY

## 2024-05-03 PROCEDURE — 3600000008 HC OR TIME - EACH INCREMENTAL 1 MINUTE - PROCEDURE LEVEL THREE: Performed by: ORTHOPAEDIC SURGERY

## 2024-05-03 PROCEDURE — 2500000004 HC RX 250 GENERAL PHARMACY W/ HCPCS (ALT 636 FOR OP/ED): Performed by: ORTHOPAEDIC SURGERY

## 2024-05-03 PROCEDURE — 2500000006 HC RX 250 W HCPCS SELF ADMINISTERED DRUGS (ALT 637 FOR ALL PAYERS): Performed by: ORTHOPAEDIC SURGERY

## 2024-05-03 PROCEDURE — 3700000002 HC GENERAL ANESTHESIA TIME - EACH INCREMENTAL 1 MINUTE: Performed by: ORTHOPAEDIC SURGERY

## 2024-05-03 PROCEDURE — C1713 ANCHOR/SCREW BN/BN,TIS/BN: HCPCS | Performed by: ORTHOPAEDIC SURGERY

## 2024-05-03 DEVICE — SCREW, LOCK VA 2.7 X 40 ST T8: Type: IMPLANTABLE DEVICE | Site: PATELLA | Status: FUNCTIONAL

## 2024-05-03 DEVICE — IMPLANTABLE DEVICE: Type: IMPLANTABLE DEVICE | Site: PATELLA | Status: FUNCTIONAL

## 2024-05-03 DEVICE — SCREW, LOCK 2.7 X 18 VA ST T8 STARDRIVE RECESS: Type: IMPLANTABLE DEVICE | Site: PATELLA | Status: FUNCTIONAL

## 2024-05-03 DEVICE — SCREW, LOCK VA 2.7 X 16 ST T8 SDRV: Type: IMPLANTABLE DEVICE | Site: PATELLA | Status: FUNCTIONAL

## 2024-05-03 DEVICE — SCREW, LOCK 2.7 X 14 VA ST T8 STARDRIVE RECESS: Type: IMPLANTABLE DEVICE | Site: PATELLA | Status: FUNCTIONAL

## 2024-05-03 DEVICE — SCREW, LOCK 2.7 X 20 VA ST T8 STARDRIVE RECESS: Type: IMPLANTABLE DEVICE | Site: PATELLA | Status: FUNCTIONAL

## 2024-05-03 DEVICE — SCREW, LOCK VA 2.7 X 38 ST T8: Type: IMPLANTABLE DEVICE | Site: PATELLA | Status: FUNCTIONAL

## 2024-05-03 DEVICE — SCREW, LOCK VA 2.7 X 12 ST T8 SDRV: Type: IMPLANTABLE DEVICE | Site: PATELLA | Status: FUNCTIONAL

## 2024-05-03 RX ORDER — PROCHLORPERAZINE EDISYLATE 5 MG/ML
10 INJECTION INTRAMUSCULAR; INTRAVENOUS EVERY 6 HOURS PRN
Status: DISCONTINUED | OUTPATIENT
Start: 2024-05-03 | End: 2024-05-04 | Stop reason: HOSPADM

## 2024-05-03 RX ORDER — SODIUM CHLORIDE, SODIUM LACTATE, POTASSIUM CHLORIDE, CALCIUM CHLORIDE 600; 310; 30; 20 MG/100ML; MG/100ML; MG/100ML; MG/100ML
100 INJECTION, SOLUTION INTRAVENOUS CONTINUOUS
Status: DISCONTINUED | OUTPATIENT
Start: 2024-05-03 | End: 2024-05-03 | Stop reason: HOSPADM

## 2024-05-03 RX ORDER — POTASSIUM CHLORIDE 750 MG/1
10 TABLET, FILM COATED, EXTENDED RELEASE ORAL DAILY
Status: DISCONTINUED | OUTPATIENT
Start: 2024-05-03 | End: 2024-05-04 | Stop reason: HOSPADM

## 2024-05-03 RX ORDER — CELECOXIB 200 MG/1
200 CAPSULE ORAL ONCE
Status: COMPLETED | OUTPATIENT
Start: 2024-05-03 | End: 2024-05-03

## 2024-05-03 RX ORDER — METOPROLOL SUCCINATE 50 MG/1
100 TABLET, EXTENDED RELEASE ORAL ONCE
Status: COMPLETED | OUTPATIENT
Start: 2024-05-03 | End: 2024-05-03

## 2024-05-03 RX ORDER — SERTRALINE HYDROCHLORIDE 50 MG/1
100 TABLET, FILM COATED ORAL DAILY
Status: DISCONTINUED | OUTPATIENT
Start: 2024-05-03 | End: 2024-05-04 | Stop reason: HOSPADM

## 2024-05-03 RX ORDER — FENTANYL CITRATE 50 UG/ML
25 INJECTION, SOLUTION INTRAMUSCULAR; INTRAVENOUS EVERY 5 MIN PRN
Status: DISCONTINUED | OUTPATIENT
Start: 2024-05-03 | End: 2024-05-03 | Stop reason: HOSPADM

## 2024-05-03 RX ORDER — MORPHINE SULFATE 2 MG/ML
2 INJECTION, SOLUTION INTRAMUSCULAR; INTRAVENOUS EVERY 2 HOUR PRN
Status: DISCONTINUED | OUTPATIENT
Start: 2024-05-03 | End: 2024-05-04 | Stop reason: HOSPADM

## 2024-05-03 RX ORDER — SODIUM CHLORIDE, SODIUM LACTATE, POTASSIUM CHLORIDE, CALCIUM CHLORIDE 600; 310; 30; 20 MG/100ML; MG/100ML; MG/100ML; MG/100ML
50 INJECTION, SOLUTION INTRAVENOUS CONTINUOUS
Status: DISCONTINUED | OUTPATIENT
Start: 2024-05-03 | End: 2024-05-04 | Stop reason: HOSPADM

## 2024-05-03 RX ORDER — IPRATROPIUM BROMIDE AND ALBUTEROL SULFATE 2.5; .5 MG/3ML; MG/3ML
SOLUTION RESPIRATORY (INHALATION) AS NEEDED
Status: DISCONTINUED | OUTPATIENT
Start: 2024-05-03 | End: 2024-05-03

## 2024-05-03 RX ORDER — PROCHLORPERAZINE 25 MG/1
25 SUPPOSITORY RECTAL EVERY 12 HOURS PRN
Status: DISCONTINUED | OUTPATIENT
Start: 2024-05-03 | End: 2024-05-04 | Stop reason: HOSPADM

## 2024-05-03 RX ORDER — PROCHLORPERAZINE MALEATE 5 MG
10 TABLET ORAL EVERY 6 HOURS PRN
Status: DISCONTINUED | OUTPATIENT
Start: 2024-05-03 | End: 2024-05-04 | Stop reason: HOSPADM

## 2024-05-03 RX ORDER — DOCUSATE SODIUM 100 MG/1
100 CAPSULE, LIQUID FILLED ORAL 2 TIMES DAILY
Status: DISCONTINUED | OUTPATIENT
Start: 2024-05-03 | End: 2024-05-04 | Stop reason: HOSPADM

## 2024-05-03 RX ORDER — NALOXONE HYDROCHLORIDE 1 MG/ML
0.2 INJECTION INTRAMUSCULAR; INTRAVENOUS; SUBCUTANEOUS EVERY 5 MIN PRN
Status: DISCONTINUED | OUTPATIENT
Start: 2024-05-03 | End: 2024-05-03 | Stop reason: SDUPTHER

## 2024-05-03 RX ORDER — DIPHENHYDRAMINE HYDROCHLORIDE 50 MG/ML
12.5 INJECTION INTRAMUSCULAR; INTRAVENOUS ONCE AS NEEDED
Status: COMPLETED | OUTPATIENT
Start: 2024-05-03 | End: 2024-05-03

## 2024-05-03 RX ORDER — APREPITANT 40 MG/1
CAPSULE ORAL AS NEEDED
Status: DISCONTINUED | OUTPATIENT
Start: 2024-05-03 | End: 2024-05-03

## 2024-05-03 RX ORDER — DIPHENHYDRAMINE HYDROCHLORIDE 50 MG/ML
INJECTION INTRAMUSCULAR; INTRAVENOUS AS NEEDED
Status: DISCONTINUED | OUTPATIENT
Start: 2024-05-03 | End: 2024-05-03

## 2024-05-03 RX ORDER — DIPHENHYDRAMINE HYDROCHLORIDE 50 MG/ML
12.5 INJECTION INTRAMUSCULAR; INTRAVENOUS EVERY 6 HOURS PRN
Status: DISCONTINUED | OUTPATIENT
Start: 2024-05-03 | End: 2024-05-04 | Stop reason: HOSPADM

## 2024-05-03 RX ORDER — MEPERIDINE HYDROCHLORIDE 25 MG/ML
12.5 INJECTION INTRAMUSCULAR; INTRAVENOUS; SUBCUTANEOUS EVERY 10 MIN PRN
Status: DISCONTINUED | OUTPATIENT
Start: 2024-05-03 | End: 2024-05-03 | Stop reason: HOSPADM

## 2024-05-03 RX ORDER — METOPROLOL SUCCINATE 50 MG/1
100 TABLET, EXTENDED RELEASE ORAL DAILY
Status: DISCONTINUED | OUTPATIENT
Start: 2024-05-03 | End: 2024-05-04 | Stop reason: HOSPADM

## 2024-05-03 RX ORDER — OXYCODONE HYDROCHLORIDE 5 MG/1
10 TABLET ORAL EVERY 4 HOURS PRN
Status: DISCONTINUED | OUTPATIENT
Start: 2024-05-03 | End: 2024-05-04 | Stop reason: HOSPADM

## 2024-05-03 RX ORDER — HYDROMORPHONE HYDROCHLORIDE 1 MG/ML
INJECTION, SOLUTION INTRAMUSCULAR; INTRAVENOUS; SUBCUTANEOUS AS NEEDED
Status: DISCONTINUED | OUTPATIENT
Start: 2024-05-03 | End: 2024-05-03

## 2024-05-03 RX ORDER — ONDANSETRON HYDROCHLORIDE 2 MG/ML
4 INJECTION, SOLUTION INTRAVENOUS ONCE AS NEEDED
Status: DISCONTINUED | OUTPATIENT
Start: 2024-05-03 | End: 2024-05-03 | Stop reason: HOSPADM

## 2024-05-03 RX ORDER — SODIUM CHLORIDE 0.9 G/100ML
IRRIGANT IRRIGATION AS NEEDED
Status: DISCONTINUED | OUTPATIENT
Start: 2024-05-03 | End: 2024-05-03 | Stop reason: HOSPADM

## 2024-05-03 RX ORDER — FENTANYL CITRATE 50 UG/ML
INJECTION, SOLUTION INTRAMUSCULAR; INTRAVENOUS AS NEEDED
Status: DISCONTINUED | OUTPATIENT
Start: 2024-05-03 | End: 2024-05-03

## 2024-05-03 RX ORDER — PROPOFOL 10 MG/ML
INJECTION, EMULSION INTRAVENOUS AS NEEDED
Status: DISCONTINUED | OUTPATIENT
Start: 2024-05-03 | End: 2024-05-03

## 2024-05-03 RX ORDER — OXYCODONE HYDROCHLORIDE 5 MG/1
5 TABLET ORAL EVERY 4 HOURS PRN
Status: DISCONTINUED | OUTPATIENT
Start: 2024-05-03 | End: 2024-05-04 | Stop reason: HOSPADM

## 2024-05-03 RX ORDER — ONDANSETRON HYDROCHLORIDE 2 MG/ML
INJECTION, SOLUTION INTRAVENOUS AS NEEDED
Status: DISCONTINUED | OUTPATIENT
Start: 2024-05-03 | End: 2024-05-03

## 2024-05-03 RX ORDER — HYDRALAZINE HYDROCHLORIDE 20 MG/ML
5 INJECTION INTRAMUSCULAR; INTRAVENOUS EVERY 30 MIN PRN
Status: DISCONTINUED | OUTPATIENT
Start: 2024-05-03 | End: 2024-05-03 | Stop reason: HOSPADM

## 2024-05-03 RX ORDER — GLYCOPYRROLATE 0.2 MG/ML
INJECTION INTRAMUSCULAR; INTRAVENOUS AS NEEDED
Status: DISCONTINUED | OUTPATIENT
Start: 2024-05-03 | End: 2024-05-03

## 2024-05-03 RX ORDER — LIDOCAINE HYDROCHLORIDE 20 MG/ML
INJECTION, SOLUTION INFILTRATION; PERINEURAL AS NEEDED
Status: DISCONTINUED | OUTPATIENT
Start: 2024-05-03 | End: 2024-05-03

## 2024-05-03 RX ORDER — CYCLOBENZAPRINE HCL 10 MG
10 TABLET ORAL 3 TIMES DAILY PRN
Status: DISCONTINUED | OUTPATIENT
Start: 2024-05-03 | End: 2024-05-04 | Stop reason: HOSPADM

## 2024-05-03 RX ORDER — ROCURONIUM BROMIDE 10 MG/ML
INJECTION, SOLUTION INTRAVENOUS AS NEEDED
Status: DISCONTINUED | OUTPATIENT
Start: 2024-05-03 | End: 2024-05-03

## 2024-05-03 RX ORDER — LABETALOL HYDROCHLORIDE 5 MG/ML
5 INJECTION, SOLUTION INTRAVENOUS ONCE AS NEEDED
Status: DISCONTINUED | OUTPATIENT
Start: 2024-05-03 | End: 2024-05-03 | Stop reason: HOSPADM

## 2024-05-03 RX ORDER — MIDAZOLAM HYDROCHLORIDE 1 MG/ML
INJECTION, SOLUTION INTRAMUSCULAR; INTRAVENOUS AS NEEDED
Status: DISCONTINUED | OUTPATIENT
Start: 2024-05-03 | End: 2024-05-03

## 2024-05-03 RX ORDER — GABAPENTIN 100 MG/1
100 CAPSULE ORAL 3 TIMES DAILY
COMMUNITY

## 2024-05-03 RX ORDER — ONDANSETRON 4 MG/1
8 TABLET, FILM COATED ORAL EVERY 8 HOURS PRN
Status: DISCONTINUED | OUTPATIENT
Start: 2024-05-03 | End: 2024-05-04 | Stop reason: HOSPADM

## 2024-05-03 RX ORDER — NALOXONE HYDROCHLORIDE 1 MG/ML
0.2 INJECTION INTRAMUSCULAR; INTRAVENOUS; SUBCUTANEOUS EVERY 5 MIN PRN
Status: DISCONTINUED | OUTPATIENT
Start: 2024-05-03 | End: 2024-05-04 | Stop reason: HOSPADM

## 2024-05-03 RX ORDER — GABAPENTIN 100 MG/1
100 CAPSULE ORAL 3 TIMES DAILY
Status: DISCONTINUED | OUTPATIENT
Start: 2024-05-03 | End: 2024-05-04 | Stop reason: HOSPADM

## 2024-05-03 RX ORDER — ACETAMINOPHEN 325 MG/1
650 TABLET ORAL ONCE
Status: COMPLETED | OUTPATIENT
Start: 2024-05-03 | End: 2024-05-03

## 2024-05-03 RX ORDER — ALBUTEROL SULFATE 0.83 MG/ML
2.5 SOLUTION RESPIRATORY (INHALATION) ONCE AS NEEDED
Status: DISCONTINUED | OUTPATIENT
Start: 2024-05-03 | End: 2024-05-03 | Stop reason: HOSPADM

## 2024-05-03 RX ORDER — ACETAMINOPHEN 325 MG/1
650 TABLET ORAL EVERY 6 HOURS SCHEDULED
Status: DISCONTINUED | OUTPATIENT
Start: 2024-05-03 | End: 2024-05-04 | Stop reason: HOSPADM

## 2024-05-03 RX ORDER — SCOLOPAMINE TRANSDERMAL SYSTEM 1 MG/1
PATCH, EXTENDED RELEASE TRANSDERMAL AS NEEDED
Status: DISCONTINUED | OUTPATIENT
Start: 2024-05-03 | End: 2024-05-03

## 2024-05-03 RX ORDER — OXYCODONE HYDROCHLORIDE 5 MG/1
10 TABLET ORAL EVERY 6 HOURS PRN
Status: DISCONTINUED | OUTPATIENT
Start: 2024-05-03 | End: 2024-05-04 | Stop reason: HOSPADM

## 2024-05-03 RX ORDER — BISACODYL 5 MG
10 TABLET, DELAYED RELEASE (ENTERIC COATED) ORAL DAILY PRN
Status: DISCONTINUED | OUTPATIENT
Start: 2024-05-03 | End: 2024-05-04 | Stop reason: HOSPADM

## 2024-05-03 RX ORDER — SODIUM CHLORIDE, SODIUM LACTATE, POTASSIUM CHLORIDE, CALCIUM CHLORIDE 600; 310; 30; 20 MG/100ML; MG/100ML; MG/100ML; MG/100ML
100 INJECTION, SOLUTION INTRAVENOUS CONTINUOUS
Status: DISCONTINUED | OUTPATIENT
Start: 2024-05-03 | End: 2024-05-04 | Stop reason: HOSPADM

## 2024-05-03 RX ORDER — ALBUTEROL SULFATE 90 UG/1
AEROSOL, METERED RESPIRATORY (INHALATION) AS NEEDED
Status: DISCONTINUED | OUTPATIENT
Start: 2024-05-03 | End: 2024-05-03

## 2024-05-03 RX ADMIN — METOPROLOL SUCCINATE 100 MG: 50 TABLET, EXTENDED RELEASE ORAL at 08:01

## 2024-05-03 RX ADMIN — HYDROMORPHONE HYDROCHLORIDE 0.5 MG: 1 INJECTION, SOLUTION INTRAMUSCULAR; INTRAVENOUS; SUBCUTANEOUS at 11:18

## 2024-05-03 RX ADMIN — SUGAMMADEX 500 MG: 100 INJECTION, SOLUTION INTRAVENOUS at 10:46

## 2024-05-03 RX ADMIN — SACUBITRIL AND VALSARTAN 1 TABLET: 24; 26 TABLET, FILM COATED ORAL at 20:51

## 2024-05-03 RX ADMIN — CELECOXIB 200 MG: 200 CAPSULE ORAL at 08:02

## 2024-05-03 RX ADMIN — FENTANYL CITRATE 50 MCG: 50 INJECTION, SOLUTION INTRAMUSCULAR; INTRAVENOUS at 09:55

## 2024-05-03 RX ADMIN — ROCURONIUM BROMIDE 50 MG: 10 SOLUTION INTRAVENOUS at 09:00

## 2024-05-03 RX ADMIN — HYDROMORPHONE HYDROCHLORIDE 0.5 MG: 1 INJECTION, SOLUTION INTRAMUSCULAR; INTRAVENOUS; SUBCUTANEOUS at 11:30

## 2024-05-03 RX ADMIN — DOCUSATE SODIUM 100 MG: 100 CAPSULE, LIQUID FILLED ORAL at 20:28

## 2024-05-03 RX ADMIN — ONDANSETRON 4 MG: 2 INJECTION, SOLUTION INTRAMUSCULAR; INTRAVENOUS at 08:50

## 2024-05-03 RX ADMIN — SERTRALINE HYDROCHLORIDE 100 MG: 50 TABLET, FILM COATED ORAL at 20:27

## 2024-05-03 RX ADMIN — DEXAMETHASONE SODIUM PHOSPHATE 8 MG: 4 INJECTION, SOLUTION INTRAMUSCULAR; INTRAVENOUS at 09:00

## 2024-05-03 RX ADMIN — FENTANYL CITRATE 25 MCG: 50 INJECTION, SOLUTION INTRAMUSCULAR; INTRAVENOUS at 11:44

## 2024-05-03 RX ADMIN — SCOPALAMINE 1 PATCH: 1 PATCH, EXTENDED RELEASE TRANSDERMAL at 08:30

## 2024-05-03 RX ADMIN — ACETAMINOPHEN 650 MG: 325 TABLET ORAL at 08:02

## 2024-05-03 RX ADMIN — PROPOFOL 100 MG: 10 INJECTION, EMULSION INTRAVENOUS at 09:00

## 2024-05-03 RX ADMIN — HYDROMORPHONE HYDROCHLORIDE 0.5 MG: 1 INJECTION, SOLUTION INTRAMUSCULAR; INTRAVENOUS; SUBCUTANEOUS at 11:23

## 2024-05-03 RX ADMIN — HYDROMORPHONE HYDROCHLORIDE 1 MG: 1 INJECTION, SOLUTION INTRAMUSCULAR; INTRAVENOUS; SUBCUTANEOUS at 09:27

## 2024-05-03 RX ADMIN — LIDOCAINE HYDROCHLORIDE 60 MG: 20 INJECTION, SOLUTION INFILTRATION; PERINEURAL at 09:00

## 2024-05-03 RX ADMIN — SODIUM CHLORIDE, POTASSIUM CHLORIDE, SODIUM LACTATE AND CALCIUM CHLORIDE: 600; 310; 30; 20 INJECTION, SOLUTION INTRAVENOUS at 08:50

## 2024-05-03 RX ADMIN — MIDAZOLAM 2 MG: 1 INJECTION INTRAMUSCULAR; INTRAVENOUS at 08:35

## 2024-05-03 RX ADMIN — APREPITANT 40 MG: 40 CAPSULE ORAL at 08:30

## 2024-05-03 RX ADMIN — VANCOMYCIN HYDROCHLORIDE 1.5 G: 1.5 INJECTION, POWDER, LYOPHILIZED, FOR SOLUTION INTRAVENOUS at 20:28

## 2024-05-03 RX ADMIN — ALBUTEROL SULFATE 16 PUFF: 90 AEROSOL, METERED RESPIRATORY (INHALATION) at 09:10

## 2024-05-03 RX ADMIN — HYDROMORPHONE HYDROCHLORIDE 0.5 MG: 1 INJECTION, SOLUTION INTRAMUSCULAR; INTRAVENOUS; SUBCUTANEOUS at 11:39

## 2024-05-03 RX ADMIN — SODIUM CHLORIDE, POTASSIUM CHLORIDE, SODIUM LACTATE AND CALCIUM CHLORIDE 100 ML/HR: 600; 310; 30; 20 INJECTION, SOLUTION INTRAVENOUS at 07:58

## 2024-05-03 RX ADMIN — IPRATROPIUM BROMIDE AND ALBUTEROL SULFATE 3 ML: .5; 3 SOLUTION RESPIRATORY (INHALATION) at 11:00

## 2024-05-03 RX ADMIN — FENTANYL CITRATE 50 MCG: 50 INJECTION, SOLUTION INTRAMUSCULAR; INTRAVENOUS at 09:00

## 2024-05-03 RX ADMIN — ACETAMINOPHEN 650 MG: 325 TABLET ORAL at 17:19

## 2024-05-03 RX ADMIN — VANCOMYCIN HYDROCHLORIDE 1500 MG: 1.5 INJECTION, POWDER, LYOPHILIZED, FOR SOLUTION INTRAVENOUS at 07:59

## 2024-05-03 RX ADMIN — Medication 2 L/MIN: at 13:15

## 2024-05-03 RX ADMIN — GABAPENTIN 100 MG: 100 CAPSULE ORAL at 20:27

## 2024-05-03 RX ADMIN — HYDROMORPHONE HYDROCHLORIDE 0.5 MG: 1 INJECTION, SOLUTION INTRAMUSCULAR; INTRAVENOUS; SUBCUTANEOUS at 11:49

## 2024-05-03 RX ADMIN — DIPHENHYDRAMINE HYDROCHLORIDE 12.5 MG: 50 INJECTION, SOLUTION INTRAMUSCULAR; INTRAVENOUS at 11:52

## 2024-05-03 RX ADMIN — OXYCODONE HYDROCHLORIDE 10 MG: 5 TABLET ORAL at 20:51

## 2024-05-03 RX ADMIN — GLYCOPYRROLATE 0.2 MG: 0.2 INJECTION, SOLUTION INTRAMUSCULAR; INTRAVENOUS at 08:50

## 2024-05-03 RX ADMIN — FENTANYL CITRATE 25 MCG: 50 INJECTION, SOLUTION INTRAMUSCULAR; INTRAVENOUS at 11:13

## 2024-05-03 RX ADMIN — SODIUM CHLORIDE, POTASSIUM CHLORIDE, SODIUM LACTATE AND CALCIUM CHLORIDE 50 ML/HR: 600; 310; 30; 20 INJECTION, SOLUTION INTRAVENOUS at 14:04

## 2024-05-03 RX ADMIN — ROPIVACAINE HYDROCHLORIDE: 5 INJECTION EPIDURAL; INFILTRATION; PERINEURAL at 08:35

## 2024-05-03 RX ADMIN — DIPHENHYDRAMINE HYDROCHLORIDE 25 MG: 50 INJECTION INTRAMUSCULAR; INTRAVENOUS at 08:31

## 2024-05-03 SDOH — SOCIAL STABILITY: SOCIAL INSECURITY: DOES ANYONE TRY TO KEEP YOU FROM HAVING/CONTACTING OTHER FRIENDS OR DOING THINGS OUTSIDE YOUR HOME?: NO

## 2024-05-03 SDOH — SOCIAL STABILITY: SOCIAL INSECURITY: ARE THERE ANY APPARENT SIGNS OF INJURIES/BEHAVIORS THAT COULD BE RELATED TO ABUSE/NEGLECT?: NO

## 2024-05-03 SDOH — SOCIAL STABILITY: SOCIAL INSECURITY: DO YOU FEEL UNSAFE GOING BACK TO THE PLACE WHERE YOU ARE LIVING?: NO

## 2024-05-03 SDOH — SOCIAL STABILITY: SOCIAL INSECURITY: ABUSE: ADULT

## 2024-05-03 SDOH — HEALTH STABILITY: MENTAL HEALTH: CURRENT SMOKER: 1

## 2024-05-03 SDOH — SOCIAL STABILITY: SOCIAL INSECURITY: ARE YOU OR HAVE YOU BEEN THREATENED OR ABUSED PHYSICALLY, EMOTIONALLY, OR SEXUALLY BY ANYONE?: NO

## 2024-05-03 SDOH — SOCIAL STABILITY: SOCIAL INSECURITY: HAVE YOU HAD THOUGHTS OF HARMING ANYONE ELSE?: NO

## 2024-05-03 SDOH — SOCIAL STABILITY: SOCIAL INSECURITY: WERE YOU ABLE TO COMPLETE ALL THE BEHAVIORAL HEALTH SCREENINGS?: YES

## 2024-05-03 SDOH — SOCIAL STABILITY: SOCIAL INSECURITY: HAVE YOU HAD ANY THOUGHTS OF HARMING ANYONE ELSE?: NO

## 2024-05-03 SDOH — SOCIAL STABILITY: SOCIAL INSECURITY: HAS ANYONE EVER THREATENED TO HURT YOUR FAMILY OR YOUR PETS?: NO

## 2024-05-03 SDOH — SOCIAL STABILITY: SOCIAL INSECURITY: DO YOU FEEL ANYONE HAS EXPLOITED OR TAKEN ADVANTAGE OF YOU FINANCIALLY OR OF YOUR PERSONAL PROPERTY?: NO

## 2024-05-03 ASSESSMENT — COGNITIVE AND FUNCTIONAL STATUS - GENERAL
MOVING TO AND FROM BED TO CHAIR: A LOT
TOILETING: A LITTLE
MOVING TO AND FROM BED TO CHAIR: A LOT
STANDING UP FROM CHAIR USING ARMS: A LITTLE
HELP NEEDED FOR BATHING: A LOT
DAILY ACTIVITIY SCORE: 20
MOBILITY SCORE: 15
DRESSING REGULAR LOWER BODY CLOTHING: A LITTLE
DRESSING REGULAR UPPER BODY CLOTHING: A LITTLE
DRESSING REGULAR UPPER BODY CLOTHING: A LITTLE
MOVING FROM LYING ON BACK TO SITTING ON SIDE OF FLAT BED WITH BEDRAILS: A LITTLE
HELP NEEDED FOR BATHING: A LITTLE
TOILETING: A LOT
DAILY ACTIVITIY SCORE: 16
PATIENT BASELINE BEDBOUND: NO
TURNING FROM BACK TO SIDE WHILE IN FLAT BAD: A LITTLE
TURNING FROM BACK TO SIDE WHILE IN FLAT BAD: A LITTLE
MOBILITY SCORE: 11
DRESSING REGULAR LOWER BODY CLOTHING: A LOT
WALKING IN HOSPITAL ROOM: TOTAL
CLIMB 3 TO 5 STEPS WITH RAILING: A LOT
MOVING FROM LYING ON BACK TO SITTING ON SIDE OF FLAT BED WITH BEDRAILS: A LITTLE
CLIMB 3 TO 5 STEPS WITH RAILING: TOTAL
STANDING UP FROM CHAIR USING ARMS: TOTAL
WALKING IN HOSPITAL ROOM: A LOT
PERSONAL GROOMING: A LITTLE

## 2024-05-03 ASSESSMENT — COLUMBIA-SUICIDE SEVERITY RATING SCALE - C-SSRS
6. HAVE YOU EVER DONE ANYTHING, STARTED TO DO ANYTHING, OR PREPARED TO DO ANYTHING TO END YOUR LIFE?: NO
1. IN THE PAST MONTH, HAVE YOU WISHED YOU WERE DEAD OR WISHED YOU COULD GO TO SLEEP AND NOT WAKE UP?: NO
2. HAVE YOU ACTUALLY HAD ANY THOUGHTS OF KILLING YOURSELF?: NO

## 2024-05-03 ASSESSMENT — PAIN SCALES - GENERAL
PAINLEVEL_OUTOF10: 10 - WORST POSSIBLE PAIN
PAINLEVEL_OUTOF10: 8
PAINLEVEL_OUTOF10: 7
PAINLEVEL_OUTOF10: 4
PAINLEVEL_OUTOF10: 8
PAINLEVEL_OUTOF10: 0 - NO PAIN
PAINLEVEL_OUTOF10: 8
PAINLEVEL_OUTOF10: 9
PAINLEVEL_OUTOF10: 0 - NO PAIN
PAINLEVEL_OUTOF10: 10 - WORST POSSIBLE PAIN
PAINLEVEL_OUTOF10: 0 - NO PAIN
PAINLEVEL_OUTOF10: 8
PAINLEVEL_OUTOF10: 9
PAINLEVEL_OUTOF10: 4
PAINLEVEL_OUTOF10: 7

## 2024-05-03 ASSESSMENT — PATIENT HEALTH QUESTIONNAIRE - PHQ9
SUM OF ALL RESPONSES TO PHQ9 QUESTIONS 1 & 2: 1
1. LITTLE INTEREST OR PLEASURE IN DOING THINGS: SEVERAL DAYS
2. FEELING DOWN, DEPRESSED OR HOPELESS: NOT AT ALL

## 2024-05-03 ASSESSMENT — PAIN - FUNCTIONAL ASSESSMENT
PAIN_FUNCTIONAL_ASSESSMENT: 0-10

## 2024-05-03 ASSESSMENT — LIFESTYLE VARIABLES
AUDIT-C TOTAL SCORE: 0
AUDIT-C TOTAL SCORE: 0
HOW MANY STANDARD DRINKS CONTAINING ALCOHOL DO YOU HAVE ON A TYPICAL DAY: PATIENT DOES NOT DRINK
HOW OFTEN DO YOU HAVE A DRINK CONTAINING ALCOHOL: NEVER
PRESCIPTION_ABUSE_PAST_12_MONTHS: NO
HOW OFTEN DO YOU HAVE 6 OR MORE DRINKS ON ONE OCCASION: NEVER
SKIP TO QUESTIONS 9-10: 1
SUBSTANCE_ABUSE_PAST_12_MONTHS: NO

## 2024-05-03 ASSESSMENT — ACTIVITIES OF DAILY LIVING (ADL)
ADEQUATE_TO_COMPLETE_ADL: YES
LACK_OF_TRANSPORTATION: PATIENT DECLINED
WALKS IN HOME: INDEPENDENT
PATIENT'S MEMORY ADEQUATE TO SAFELY COMPLETE DAILY ACTIVITIES?: YES
HEARING - LEFT EAR: DIFFICULTY WITH NOISE
ASSISTIVE_DEVICE: EYEGLASSES;WALKER;CANE
GROOMING: INDEPENDENT
TOILETING: INDEPENDENT
BATHING: INDEPENDENT
HEARING - RIGHT EAR: DIFFICULTY WITH NOISE
LACK_OF_TRANSPORTATION: NO
DRESSING YOURSELF: INDEPENDENT
JUDGMENT_ADEQUATE_SAFELY_COMPLETE_DAILY_ACTIVITIES: YES
FEEDING YOURSELF: INDEPENDENT

## 2024-05-03 ASSESSMENT — PAIN DESCRIPTION - DESCRIPTORS: DESCRIPTORS: ACHING;SHARP;THROBBING

## 2024-05-03 ASSESSMENT — PAIN DESCRIPTION - ORIENTATION: ORIENTATION: LEFT

## 2024-05-03 ASSESSMENT — PAIN DESCRIPTION - LOCATION: LOCATION: KNEE

## 2024-05-03 NOTE — OP NOTE
Operative Note     Date: 5/3/2024  OR Location: [facility]    Name: Lisa Guerrero : 1965, Age: 59 y.o., MRN: [id number], Sex: female    Diagnosis  Pre-op Diagnosis     * Unspecified fracture of left patella, initial encounter for closed fracture [S82.002A] Post-op Diagnosis     * Unspecified fracture of left patella, initial encounter for closed fracture [S82.002A]     Procedures  Open Reduction Internal Fixation Patella Fracture  21576 - IA OPTX PATLLR FX W/INT FIXJ/PATLLC&SOFT TISS RPR  (LEFT)    Surgeons      * Favian Mcghee - Primary    Procedure Summary  Anesthesia: General  ASA: III  Anesthesia Staff: Anesthesiologist: Corbin Lugo DO  CRNA: TONY Milan-CRNA  Estimated Blood Loss: 20 mL  Intra-op Medications:   Administrations occurring from 0915 to 1030 on 24:   Medication Name Total Dose   sodium chloride 0.9 % irrigation solution 1,000 mL   sodium chloride 0.9 % irrigation solution 3,000 mL              Anesthesia Record               Intraprocedure I/O Totals          Intake    LR bolus 1000.00 mL    Total Intake 1000 mL          Specimen: No specimens collected     Staff:   Circulator: Pete Cardozo RN  Scrub Person: Taryn Sy           Implants:  Implants       Type Name Action Serial No.       2.7MM SYNTHES VARIABLE ANGLE LOCKING ANTERIOR PATELLA PLATE, SS, 3-HOLE, SMALL, STERILE Implanted               Findings: see procedure details    Indications:     The patient was seen in the preoperative area. The risks, benefits, complications, treatment options, non-operative alternatives, expected recovery and outcomes were discussed with the patient. The possibilities of reaction to medication, pulmonary aspiration, injury to surrounding structures, bleeding, recurrent infection, the need for additional procedures, failure to diagnose a condition, and creating a complication requiring transfusion or operation were discussed with the patient. The patient concurred with the  proposed plan, giving informed consent.  The site of surgery was properly noted/marked if necessary per policy. The patient has been actively warmed in preoperative area. Preoperative antibiotics have been ordered and given within 1 hours of incision. Venous thrombosis prophylaxis have been ordered.    Patient was noted to have a patellar fracture.  Surgical repair was deemed appropriate to improve function and prevent additional pathology.  The pre-op discussion including discussing associated interventions and risks, including re-rupture, infection, DVT/PE, and incomplete resolution of symptoms.    She had a large fracture blister we waited nearly 2 weeks for the skin to heal and some wrinkles deformed.  We discussed high concern for infection wound issues based on her chronic venous insufficiency and smoking.  We did both arterial and venous studies ahead of time assessing ABIs to help identify capacity heal and risk factors.    Procedure Details:   The patient was met prior to surgery and the appropriate extremity was marked.  We reviewed recent health history and found no contraindication to proceeding with the planned procedure.  The patient was transported to the operating room and underwent general anesthesia.  The patient was positioned supine on the operative table with all sheldon prominences well-padded. A sequential compression device was placed on the contralateral leg.  A non-sterile thigh tourniquet was placed on the operative leg.      The leg was prepped and draped in the usual sterile fashion.  A timeout was completed and antibiotic administration was in accordance with standard protocol.  The leg was exsanguinated using an Esmarch bandage and the tourniquet was inflated.  The superficial landmarks of the knee were palpated.  A longitudinal incision was made. We dissected down through the skin and subcutaneous tissue until the retinacular layer was encountered.      The patella fracture was noted it  was transverse with comminution of the inferior pole.  There is no significant chondral damage to the patellar trochlea outside of the fracture.  We cleaned up the bone and the fracture edges and used a pointed reduction clamp to reduce the fracture.  Once the fracture was reduced we then templated and used the smaller*patellar plate Synthes.  We placed 3 distal to proximal screws bicortically trying to capture all of the fragments distally there was 1 small fragment there we were unable to capture but was reduced nicely.  We then placed unicortical locking screws in the additional portions of the plate.  We confirmed both fluoroscopically and passed with gentle palpation good reduction of the articular surface.    Once the fracture was repaired we repaired the retinaculum using a #1 Ethibond.  Final imaging was taken the wound was lavaged with Irrisept and pulsatile lavage irrigation.    The skin closed using a 2-0 Vicryl and staples suture in the skin.  The leg was dressed in sterile bandages.  The area of the blister had been cleaned prior to the surgery and the prep did have a healthy dermal tissue.  Staples in the skin, Xeroform over the blister and a waterproof adherent dressing.     The surgery was completed with the assistance of a surgical assistant.  I was present for all portions of the procedure from opening/exposure through wound closure.  The PA assisted with the critical portions.  No qualified residents were available to assist.    The physician assistant was present for the entire case.  Given the nature of the procedure and disease process, a skilled surgical assistant was necessary for the case.  The assistant was necessary for retraction and helped directly facilitate completion of the surgery.  A certified scrub tech was at the back table managing instruments and supplies for the surgical procedure.      Complications:  None; patient tolerated the procedure well.    Disposition: PACU -  hemodynamically stable.  Condition: stable         Favian Mcghee  Phone Number: [phone]

## 2024-05-03 NOTE — ANESTHESIA PREPROCEDURE EVALUATION
Lisa Guerrero is a 59 y.o. female here for:    Open Reduction Internal Fixation Patella Fracture  With Favian Mcghee MD  Pre-Op Diagnosis Codes:     * Knee fracture [S82.002A]    Relevant Problems   Cardiac  Echo 6/2023:  CONCLUSIONS:  1. Left ventricular systolic function is mildly decreased with a 40-45% estimated ejection fraction.  2. Moderately enlarged right ventricle.  3. There is moderately reduced right ventricular systolic function.  4. The left atrium is moderately dilated.  5. Moderate mitral valve regurgitation.  6. Mildly elevated RVSP.  7. Moderate tricuspid regurgitation.  8. Left ventricular cavity size is moderately dilated.  9. There is global hypokinesis of the left ventricle with minor regional variations.     (+) Abnormal EKG   (+) Hypertension   (+) Mitral regurgitation      Pulmonary   (+) Chronic obstructive pulmonary disease (Multi)   (+) Dyspnea on minimal exertion   (+) HERNANDO (obstructive sleep apnea)   (+) Pneumonia      Neuro   (+) Chronic left-sided lumbar radiculopathy   (+) Depression   (+) Panic attacks      GI   (+) Esophageal dysphagia   (+) GERD (gastroesophageal reflux disease)      Liver   (+) Fatty liver      Endocrine   (+) Class 3 severe obesity in adult (Multi)      Hematology   (+) Factor V Leiden (Multi)   (+) Factor V deficiency (Multi)   (+) Iron deficiency anemia      Musculoskeletal   (+) Lumbar disc disease   (+) Osteoarthritis of left hip      GYN   (+) PCOD (polycystic ovarian disease)       Lab Results   Component Value Date    HGB 15.7 04/22/2024    HCT 47.9 (H) 04/22/2024    WBC 10.2 04/22/2024     04/22/2024     04/22/2024    K 4.2 04/22/2024     04/22/2024    CREATININE 0.87 04/22/2024    BUN 20 04/22/2024       Social History     Tobacco Use   Smoking Status Every Day    Current packs/day: 0.25    Types: Cigarettes   Smokeless Tobacco Never       Allergies   Allergen Reactions    Ceftriaxone Hives    Codeine Hives    Penicillins Hives     Percocet [Oxycodone-Acetaminophen] GI Upset and Nausea/vomiting    Sacubitril-Valsartan Headache and Dizziness     Nightmares, dry mouth    Sulfamethoxazole-Trimethoprim Hives    Hydrogen Peroxide Rash     Burning/swelling    Latex Rash     Blisters        Current Outpatient Medications   Medication Instructions    albuterol 90 mcg/actuation inhaler 1-2 puffs, inhalation, Every 4 hours PRN    ammonium lactate (Lac-Hydrin) 12 % lotion Topical, As needed    bumetanide (Bumex) 2 mg tablet oral, 2 times daily    Entresto 24-26 mg tablet 1 tablet, oral, 2 times daily    fluticasone (Flonase) 50 mcg/actuation nasal spray 2 sprays, nasal, Daily    ketoconazole (NIZOral) 2 % cream Topical, Daily    magnesium oxide (MAG-OX) 400 mg, oral, Daily    metoprolol succinate XL (TOPROL-XL) 100 mg, oral, Daily, Do not crush or chew.    potassium chloride CR 10 mEq ER tablet 10 mEq, oral, Daily, Do not crush, chew, or split.    sertraline (ZOLOFT) 100 mg, oral, Daily    TURMERIC ORAL 1 capsule, oral, Daily    Xarelto 20 mg, oral, Daily       Past Surgical History:   Procedure Laterality Date    CARDIAC CATHETERIZATION      CHOLECYSTECTOMY      TOTAL HIP ARTHROPLASTY Left     TUBAL LIGATION         Family History   Problem Relation Name Age of Onset    Aneurysm Mother      No Known Problems Father         NPO Details:  No data recorded    Physical Exam    Airway  Mallampati: II  TM distance: >3 FB  Neck ROM: full     Cardiovascular - normal exam     Dental - normal exam     Pulmonary - normal exam     Abdominal            Anesthesia Plan    History of general anesthesia?: yes  History of complications of general anesthesia?: no    ASA 3     general and regional   (ETT)  The patient is a current smoker.    intravenous induction   Postoperative administration of opioids is intended.  Trial extubation is planned.  Anesthetic plan and risks discussed with patient.    Plan discussed with CRNA.

## 2024-05-03 NOTE — PROGRESS NOTES
05/03/24 1515   Discharge Planning   Living Arrangements Spouse/significant other;Children;Family members   Support Systems Spouse/significant other;Children;Family members   Assistance Needed none, pt completely independent prior to fracture, Pt has been ambulating with cane and walker since fx, drove, pt has tub shower with grab bar   Type of Residence Private residence  (2 level home + basement (laundry), pt bedroom/bathroom first floor)   Number of Stairs to Enter Residence 3   Number of Stairs Within Residence 12   Do you have animals or pets at home? Yes   Type of Animals or Pets 1 dog   Home or Post Acute Services None   Patient expects to be discharged to: Home   Does the patient need discharge transport arranged? No   Financial Resource Strain   How hard is it for you to pay for the very basics like food, housing, medical care, and heating? Not hard   Housing Stability   In the last 12 months, was there a time when you were not able to pay the mortgage or rent on time? N   In the last 12 months, how many places have you lived? 1   In the last 12 months, was there a time when you did not have a steady place to sleep or slept in a shelter (including now)? N   Transportation Needs   In the past 12 months, has lack of transportation kept you from medical appointments or from getting medications? no   In the past 12 months, has lack of transportation kept you from meetings, work, or from getting things needed for daily living? No     Pt who fell on 4/23 with Left patella fx came in for elective ORIF Left patella fx 5/3/24 with Dr. Favian Mcghee. Pt has been home with family since fx and using walker and cane since injury, family assisting. PT/OT evals are currently pending but pt/family anticipate return home no needs. CT team will monitor case for progression and potential DC needs.

## 2024-05-03 NOTE — ANESTHESIA PROCEDURE NOTES
Airway  Date/Time: 5/3/2024 9:03 AM  Urgency: elective    Airway not difficult    Staffing  Performed: CRNA   Authorized by: Corbin Lugo DO    Performed by: TONY Milan-CANDIDA  Patient location during procedure: OR    Indications and Patient Condition  Indications for airway management: anesthesia and airway protection  Spontaneous Ventilation: absent  Sedation level: deep  Preoxygenated: yes  Patient position: sniffing  Mask difficulty assessment: 2 - vent by mask + OA or adjuvant +/- NMBA    Final Airway Details  Final airway type: endotracheal airway      Successful airway: ETT  Cuffed: yes   Successful intubation technique: video laryngoscopy  Facilitating devices/methods: intubating stylet  Endotracheal tube insertion site: oral  Blade size: #3  ETT size (mm): 7.5  Cormack-Lehane Classification: grade I - full view of glottis  Placement verified by: chest auscultation and capnometry   Measured from: gums  ETT to gums (cm): 22  Number of attempts at approach: 1    Additional Comments  Combination inhalation and IV induction

## 2024-05-03 NOTE — DISCHARGE INSTRUCTIONS
Post-Operative Instructions - Patellar Fx  Dr. Favian Mcghee    Activity / Swelling:  NON-WEIGHTBEARING until NERVE BLOCK wears OFF. You MUST have ASSISTANCE when GETTING UP and walking/ambulating, including using the bathroom.     Once nerve block wears off, progressive weight-bearing as tolerated (ok to put as much weight as you feel comfortable on operative leg) with crutches but keep leg straight and in brace.  BRACE TO REMAIN ON UNTIL FOLLOW-UP except for hygiene (see below)    ICE PACK to assist with pain control   Packs should not be in direct contact with your skin  Use fairly continuously until you remove the post-op dressing  After dressing removed, use for up to 20 minutes every hour as needed for pain and swelling     Diet:  Gradually resume your normal diet as tolerated following surgery.  The evening of your surgical day, begin with liquids and/or light foods.  If you are feeling well enough the next morning, progress to your normal eating patterns    Dressing care /Showering / Hygiene:  Keep operative dressing clean, dry, and intact.     Remove dressing on Post-Op Day 3  (Keep leg straight when out of brace)   Leave the Steri-strips (small white tapes across the incisions) in place.  If no Steri-strips or they come off with dressing cover incisions with a regular / large band aid.  Do not apply lotions or ointments to incisions.  Observe the incision sites for increased redness, drainage, or foul odor.   If waterproof dressing has been placed in addition to smaller incisions, leave waterproof dressing on until follow-up.    Showering:  Once the dressing has been removed, you may shower. Incisions may be gently cleansed with mild soap. Do not scrub or rub incisions. No baths, hot-tubs, pools, or immersive soaking of any kind until sutures are removed and incisions are healed.  If a waterproof dressing has been placed leave on until follow-up.  Cover after with large Band-Aid and return to brace  once dry.        Medications:  Pain:  You will be given a prescription for pain medication after your surgery.  It should be taken as needed only.  The goal is to discontinue it as quickly as possible.  DO NOT drive or operate heavy machinery while taking this medication as it will make you drowsy.  Do not take any additional pain medications.  This medication often continues Tylenol / acetaminophen and NO additional acetaminophen should be taken.    You may want to consider also taking over-the-counter stool softener and/or laxative (Colace, Senekot or Dulcolax). These medications should be taken as directed and only short term.    In addition to pain medication recommend over the counter Ibuprofen 600 mg every 6-8 hours.  Take with food and avoid if any issues with stomach/GI or kidneys.  Can also add over the counter medicine (Pepcid/omeprazole) to help protect the stomach.  Do NOT take any additional anti-inflammatories.  Do not take the ibuprofen if prior bleeding issues or history of ulcers.     Prevention of Blood Clots:  Resume xarelto post op day 1    Physical therapy:  Will be discussed at first follow-up appointment but can begin the week after surgery.     Home therapy can begin the day after surgery ~ 4 times a day for 20 min:  prop up the heel and working on gently pushing down on thigh to promote a STRAIGHT LEG.    Quad sets: with leg straight tighten quad muscles and hold for 5 seconds.     * Bending is generally limited until follow-up    Driving: once off narcotics, off crutches, and good leg control is achieved.  Will be discussed at first visit, no driving prior.    Follow-up:         Generally 10-14 days post-op    Call with any concerns, especially calf pain, signs of infection (fever, drainage) or shortness of breath.    Phone: [phone]

## 2024-05-03 NOTE — ANESTHESIA POSTPROCEDURE EVALUATION
Patient: Lisa Guerrero    Procedure Summary       Date: 05/03/24 Room / Location: [facility] / [facility]    Anesthesia Start: 0855 Anesthesia Stop: 1102    Procedure: Open Reduction Internal Fixation Patella Fracture (Left: Knee) Diagnosis:       Unspecified fracture of left patella, initial encounter for closed fracture      (Unspecified fracture of left patella, initial encounter for closed fracture [S82.002A])    Surgeons: Favian Mcghee MD Responsible Provider: Corbin Lugo DO    Anesthesia Type: general ASA Status: 3            Anesthesia Type: general    Vitals Value Taken Time   /58 05/03/24 1102   Temp 36.5 05/03/24 1102   Pulse 80 05/03/24 1101   Resp 18 05/03/24 1101   SpO2 99 % 05/03/24 1101   Vitals shown include unfiled device data.    Anesthesia Post Evaluation    Patient location during evaluation: PACU  Patient participation: complete - patient participated  Level of consciousness: sleepy but conscious  Pain management: adequate  Airway patency: stertor  Cardiovascular status: acceptable, blood pressure returned to baseline and hemodynamically stable  Respiratory status: acceptable, spontaneous ventilation, unassisted, face mask and oral airway  Hydration status: acceptable  Postoperative Nausea and Vomiting: none      There were no known notable events for this encounter.

## 2024-05-03 NOTE — ANESTHESIA PROCEDURE NOTES
Peripheral Block    Patient location during procedure: pre-op  Start time: 5/3/2024 8:35 AM  End time: 5/3/2024 8:45 AM  Reason for block: at surgeon's request and post-op pain management  Staffing  Performed: attending   Authorized by: Corbin Lugo DO    Performed by: Corbin Lugo DO  Preanesthetic Checklist  Completed: patient identified, IV checked, site marked, risks and benefits discussed, surgical consent, monitors and equipment checked, pre-op evaluation and timeout performed   Timeout performed at: 5/3/2024 8:35 AM  Peripheral Block  Patient position: laying flat  Prep: ChloraPrep  Patient monitoring: heart rate, cardiac monitor and continuous pulse ox  Block type: adductor canal  Laterality: left  Injection technique: single-shot  Guidance: ultrasound guided  Local infiltration: lidocaine  Infiltration strength: 1 %  Dose: 5 mL  Needle  Needle gauge: 22 G  Needle length: 10 cm  Needle localization: anatomical landmarks and ultrasound guidance  Assessment  Injection assessment: negative aspiration for heme, no paresthesia on injection, incremental injection and local visualized surrounding nerve on ultrasound  Paresthesia pain: none  Heart rate change: no  Slow fractionated injection: yes  Additional Notes  Adductor canal and IPACK nerve blocks performed for post-operative analgesia. 2 mg IV versed given for procedural sedation. Sterile prep. 20 mL of 0.5% ropivacaine with decadron 5 mg given in divided doses for the adductor canal. Negative aspiration for heme every 5 mL injected. No pain, paresthesias. Patient tolerated the procedure well without complications.

## 2024-05-04 ENCOUNTER — PHARMACY VISIT (OUTPATIENT)
Dept: PHARMACY | Facility: CLINIC | Age: 59
End: 2024-05-04
Payer: COMMERCIAL

## 2024-05-04 VITALS
RESPIRATION RATE: 17 BRPM | HEART RATE: 55 BPM | OXYGEN SATURATION: 96 % | WEIGHT: 254.41 LBS | HEIGHT: 64 IN | DIASTOLIC BLOOD PRESSURE: 52 MMHG | TEMPERATURE: 95.7 F | BODY MASS INDEX: 43.43 KG/M2 | SYSTOLIC BLOOD PRESSURE: 90 MMHG

## 2024-05-04 PROBLEM — S82.002A UNSPECIFIED FRACTURE OF LEFT PATELLA, INITIAL ENCOUNTER FOR CLOSED FRACTURE: Status: RESOLVED | Noted: 2024-04-24 | Resolved: 2024-05-04

## 2024-05-04 PROBLEM — Z87.81 HISTORY OF PATELLAR FRACTURE: Status: RESOLVED | Noted: 2024-05-03 | Resolved: 2024-05-04

## 2024-05-04 LAB
ANION GAP SERPL CALC-SCNC: 10 MMOL/L (ref 10–20)
BUN SERPL-MCNC: 25 MG/DL (ref 6–23)
CALCIUM SERPL-MCNC: 8.3 MG/DL (ref 8.6–10.3)
CHLORIDE SERPL-SCNC: 106 MMOL/L (ref 98–107)
CO2 SERPL-SCNC: 26 MMOL/L (ref 21–32)
CREAT SERPL-MCNC: 0.9 MG/DL (ref 0.5–1.05)
EGFRCR SERPLBLD CKD-EPI 2021: 73 ML/MIN/1.73M*2
ERYTHROCYTE [DISTWIDTH] IN BLOOD BY AUTOMATED COUNT: 14.7 % (ref 11.5–14.5)
GLUCOSE SERPL-MCNC: 111 MG/DL (ref 74–99)
HCT VFR BLD AUTO: 32.5 % (ref 36–46)
HGB BLD-MCNC: 10.4 G/DL (ref 12–16)
MCH RBC QN AUTO: 28.7 PG (ref 26–34)
MCHC RBC AUTO-ENTMCNC: 32 G/DL (ref 32–36)
MCV RBC AUTO: 90 FL (ref 80–100)
NRBC BLD-RTO: 0 /100 WBCS (ref 0–0)
PLATELET # BLD AUTO: 283 X10*3/UL (ref 150–450)
POTASSIUM SERPL-SCNC: 4.4 MMOL/L (ref 3.5–5.3)
RBC # BLD AUTO: 3.62 X10*6/UL (ref 4–5.2)
SODIUM SERPL-SCNC: 138 MMOL/L (ref 136–145)
WBC # BLD AUTO: 15.8 X10*3/UL (ref 4.4–11.3)

## 2024-05-04 PROCEDURE — 80048 BASIC METABOLIC PNL TOTAL CA: CPT | Performed by: ORTHOPAEDIC SURGERY

## 2024-05-04 PROCEDURE — 97161 PT EVAL LOW COMPLEX 20 MIN: CPT | Mod: GP | Performed by: PHYSICAL THERAPIST

## 2024-05-04 PROCEDURE — 2500000001 HC RX 250 WO HCPCS SELF ADMINISTERED DRUGS (ALT 637 FOR MEDICARE OP): Performed by: STUDENT IN AN ORGANIZED HEALTH CARE EDUCATION/TRAINING PROGRAM

## 2024-05-04 PROCEDURE — G0378 HOSPITAL OBSERVATION PER HR: HCPCS

## 2024-05-04 PROCEDURE — RXMED WILLOW AMBULATORY MEDICATION CHARGE

## 2024-05-04 PROCEDURE — 2500000004 HC RX 250 GENERAL PHARMACY W/ HCPCS (ALT 636 FOR OP/ED): Performed by: ORTHOPAEDIC SURGERY

## 2024-05-04 PROCEDURE — 36415 COLL VENOUS BLD VENIPUNCTURE: CPT | Performed by: ORTHOPAEDIC SURGERY

## 2024-05-04 PROCEDURE — 85027 COMPLETE CBC AUTOMATED: CPT | Performed by: ORTHOPAEDIC SURGERY

## 2024-05-04 PROCEDURE — 2500000001 HC RX 250 WO HCPCS SELF ADMINISTERED DRUGS (ALT 637 FOR MEDICARE OP): Performed by: ORTHOPAEDIC SURGERY

## 2024-05-04 PROCEDURE — 96375 TX/PRO/DX INJ NEW DRUG ADDON: CPT

## 2024-05-04 PROCEDURE — 97116 GAIT TRAINING THERAPY: CPT | Mod: GP | Performed by: PHYSICAL THERAPIST

## 2024-05-04 PROCEDURE — 2500000006 HC RX 250 W HCPCS SELF ADMINISTERED DRUGS (ALT 637 FOR ALL PAYERS): Performed by: ORTHOPAEDIC SURGERY

## 2024-05-04 RX ORDER — DOCUSATE SODIUM 100 MG/1
100 CAPSULE, LIQUID FILLED ORAL 2 TIMES DAILY
Qty: 20 CAPSULE | Refills: 0 | Status: SHIPPED | OUTPATIENT
Start: 2024-05-04 | End: 2024-05-14

## 2024-05-04 RX ORDER — CYCLOBENZAPRINE HCL 10 MG
10 TABLET ORAL 3 TIMES DAILY PRN
Qty: 21 TABLET | Refills: 0 | Status: SHIPPED | OUTPATIENT
Start: 2024-05-04 | End: 2024-05-11

## 2024-05-04 RX ORDER — ACETAMINOPHEN 325 MG/1
650 TABLET ORAL EVERY 6 HOURS SCHEDULED
Qty: 80 TABLET | Refills: 0 | Status: SHIPPED | OUTPATIENT
Start: 2024-05-04 | End: 2024-05-14

## 2024-05-04 RX ORDER — OXYCODONE HYDROCHLORIDE 5 MG/1
5 TABLET ORAL EVERY 6 HOURS PRN
Qty: 28 TABLET | Refills: 0 | Status: SHIPPED | OUTPATIENT
Start: 2024-05-04 | End: 2024-05-07 | Stop reason: SDUPTHER

## 2024-05-04 RX ADMIN — MORPHINE SULFATE 2 MG: 2 INJECTION, SOLUTION INTRAMUSCULAR; INTRAVENOUS at 06:31

## 2024-05-04 RX ADMIN — OXYCODONE HYDROCHLORIDE 10 MG: 5 TABLET ORAL at 05:19

## 2024-05-04 RX ADMIN — CYCLOBENZAPRINE HYDROCHLORIDE 10 MG: 10 TABLET, FILM COATED ORAL at 08:04

## 2024-05-04 RX ADMIN — GABAPENTIN 100 MG: 100 CAPSULE ORAL at 08:04

## 2024-05-04 RX ADMIN — POTASSIUM CHLORIDE 10 MEQ: 750 TABLET, EXTENDED RELEASE ORAL at 08:03

## 2024-05-04 RX ADMIN — SACUBITRIL AND VALSARTAN 1 TABLET: 24; 26 TABLET, FILM COATED ORAL at 08:04

## 2024-05-04 RX ADMIN — ACETAMINOPHEN 650 MG: 325 TABLET ORAL at 00:38

## 2024-05-04 RX ADMIN — RIVAROXABAN 20 MG: 20 TABLET, FILM COATED ORAL at 08:04

## 2024-05-04 RX ADMIN — ACETAMINOPHEN 650 MG: 325 TABLET ORAL at 05:19

## 2024-05-04 RX ADMIN — DOCUSATE SODIUM 100 MG: 100 CAPSULE, LIQUID FILLED ORAL at 08:04

## 2024-05-04 RX ADMIN — METOPROLOL SUCCINATE 100 MG: 50 TABLET, EXTENDED RELEASE ORAL at 08:03

## 2024-05-04 RX ADMIN — OXYCODONE HYDROCHLORIDE 10 MG: 5 TABLET ORAL at 09:34

## 2024-05-04 RX ADMIN — ACETAMINOPHEN 650 MG: 325 TABLET ORAL at 11:35

## 2024-05-04 ASSESSMENT — PAIN - FUNCTIONAL ASSESSMENT
PAIN_FUNCTIONAL_ASSESSMENT: 0-10

## 2024-05-04 ASSESSMENT — PAIN DESCRIPTION - ORIENTATION: ORIENTATION: LEFT

## 2024-05-04 ASSESSMENT — COGNITIVE AND FUNCTIONAL STATUS - GENERAL
MOVING TO AND FROM BED TO CHAIR: A LITTLE
MOBILITY SCORE: 15
TURNING FROM BACK TO SIDE WHILE IN FLAT BAD: A LITTLE
CLIMB 3 TO 5 STEPS WITH RAILING: TOTAL
MOVING FROM LYING ON BACK TO SITTING ON SIDE OF FLAT BED WITH BEDRAILS: A LITTLE
WALKING IN HOSPITAL ROOM: A LITTLE
STANDING UP FROM CHAIR USING ARMS: A LOT
MOBILITY SCORE: 18
MOVING FROM LYING ON BACK TO SITTING ON SIDE OF FLAT BED WITH BEDRAILS: A LITTLE
WALKING IN HOSPITAL ROOM: A LITTLE
TURNING FROM BACK TO SIDE WHILE IN FLAT BAD: A LITTLE
CLIMB 3 TO 5 STEPS WITH RAILING: A LITTLE
MOVING TO AND FROM BED TO CHAIR: A LITTLE
STANDING UP FROM CHAIR USING ARMS: A LITTLE

## 2024-05-04 ASSESSMENT — PAIN SCALES - GENERAL
PAINLEVEL_OUTOF10: 5 - MODERATE PAIN
PAINLEVEL_OUTOF10: 7
PAINLEVEL_OUTOF10: 4

## 2024-05-04 ASSESSMENT — PAIN DESCRIPTION - DESCRIPTORS
DESCRIPTORS: BURNING

## 2024-05-04 ASSESSMENT — PAIN DESCRIPTION - LOCATION: LOCATION: KNEE

## 2024-05-04 NOTE — CARE PLAN
Problem: Pain  Goal: Free from opioid side effects throughout the shift  Outcome: Progressing     Problem: Fall/Injury  Goal: Not fall by end of shift  Outcome: Progressing     Problem: Pain  Goal: My pain/discomfort is manageable  Outcome: Progressing     Problem: Safety  Goal: I will remain free of falls  Outcome: Progressing     Problem: Psychosocial Needs  Goal: Demonstrates ability to cope with hospitalization/illness  Outcome: Progressing      97.9

## 2024-05-04 NOTE — NURSING NOTE
This RN removed surgical dressing. Pt noted to have significant edema to left knee. This RN attempted to put on thigh high brock hose. Pt unable to tolerate at this time.

## 2024-05-04 NOTE — PROGRESS NOTES
Physical Therapy    Physical Therapy Treatment    Patient Name: Lisa Guerrero  MRN: [id number]  Today's Date: 5/4/2024  Time Calculation  Start Time: 1047  Stop Time: 1103  Time Calculation (min): 16 min       Assessment/Plan   PT Assessment  PT Assessment Results: Decreased strength, Decreased endurance, Impaired balance, Decreased mobility, Pain, Orthopedic restrictions  Rehab Prognosis: Good  Evaluation/Treatment Tolerance: Patient limited by pain  Medical Staff Made Aware: Yes  Strengths: Support of Caregivers, Premorbid level of function, Living arrangement secure, Housing layout, Attitude of self, Access to adaptive/assistive products  Barriers to Participation: Comorbidities  End of Session Communication: Bedside nurse, PCT/NA/CTA  End of Session Patient Position: Alarm on, Up in chair (Call light within reach)  PT Plan  Inpatient/Swing Bed or Outpatient: Inpatient  PT Plan  Treatment/Interventions: Bed mobility, Transfer training, Gait training, Balance training, Endurance training, Stair training  PT Plan: Skilled PT  PT Frequency: Daily  PT Discharge Recommendations: Low intensity level of continued care  PT Recommended Transfer Status: Assist x1, Assistive device      General Visit Information:   PT  Visit  PT Received On: 05/04/24  Response to Previous Treatment: Patient with no complaints from previous session.  General  Reason for Referral: impaired mobility, s/p ORIF L patella  Referred By: Dr. Mcghee (PT only 5/3)  Past Medical History Relevant to Rehab: includes: CHF, L ADAMS, gout, marcus, hepatomegaly, smoker  Family/Caregiver Present: No  Prior to Session Communication: Bedside nurse  Patient Position Received: Up in chair, Alarm on  Preferred Learning Style: auditory, verbal  General Comment: Pt. agreeable to amb on curb step  Subjective     Precautions:  Precautions  LE Weight Bearing Status:  (WBAT L LE)  Post-Surgical Precautions:  (Knee immobilizer L LE when OOB and no ROM L knee per ortho  CNP)  Braces Applied: Knee immobilizer L LE         Objective     Pain:  Pain Assessment  Pain Assessment: 0-10  Pain Score: 5 - Moderate pain  Pain Type: Surgical pain  Pain Location: Knee  Pain Orientation: Left  Pain Descriptors: Burning  Pain Interventions: Repositioned    Cognition:  Cognition  Overall Cognitive Status: Within Functional Limits  Orientation Level: Oriented X4             Activity Tolerance:  Activity Tolerance  Endurance: Decreased tolerance for upright activites    Treatments:             Ambulation/Gait Training  Ambulation/Gait Training Performed: Yes  Ambulation/Gait Training 2  Surface 2: Level tile  Device 2: Rolling walker  Gait Support Devices: Knee immobilizer  Assistance 2:  (SBA)  Quality of Gait 2:  (slow, antalgic step-to gait with shortened step lengths)  Comments/Distance (ft) 2: 20' x 2; SBA for safety  Transfers  Transfer: Yes  Transfer 1  Technique 1: Sit to stand, Stand to sit  Transfer Device 1:  (FWW)  Transfer Level of Assistance 1: Contact guard  Trials/Comments 1: CGA for safety. Increased time to lower L LE from recliner due to pain  Stairs  Stairs: Yes  Stairs  Rails 1: None (Comment)  Curb Step 1: Yes  Device 1:  (FWW)  Support Devices 1:  (Knee immobilizer L LE)  Assistance 1: Contact guard  Comment/Number of Steps 1: CGA for safety       Outcome Measures:  [facility] Basic Mobility  Turning from your back to your side while in a flat bed without using bedrails: A little  Moving from lying on your back to sitting on the side of a flat bed without using bedrails: A little  Moving to and from bed to chair (including a wheelchair): A little  Standing up from a chair using your arms (e.g. wheelchair or bedside chair): A little  To walk in hospital room: A little  Climbing 3-5 steps with railing: A little  Basic Mobility - Total Score: 18  Education Documentation  Precautions, taught by Corky Sterling, PT at 5/4/2024  1:29 PM.  Learner: Patient  Readiness: Acceptance  Method:  Explanation  Response: Verbalizes Understanding, Needs Reinforcement  Comment: Role of PT transfers, amb, safety, stairs PT POC    Mobility Training, taught by Corky Sterling PT at 5/4/2024  1:29 PM.  Learner: Patient  Readiness: Acceptance  Method: Explanation  Response: Verbalizes Understanding, Needs Reinforcement  Comment: Role of PT transfers, amb, safety, stairs PT POC    Precautions, taught by Corky Sterling, PT at 5/4/2024  1:20 PM.  Learner: Patient  Readiness: Acceptance  Method: Explanation  Response: Verbalizes Understanding, Needs Reinforcement  Comment: Role of PT, transfers, amb, safety, Knee imobilizer, PT POC    Mobility Training, taught by Corky Sterling, PT at 5/4/2024  1:20 PM.  Learner: Patient  Readiness: Acceptance  Method: Explanation  Response: Verbalizes Understanding, Needs Reinforcement  Comment: Role of PT, transfers, amb, safety, Knee imobilizer, PT POC          EDUCATION:     Encounter Problems       Encounter Problems (Active)       PT Problem       Pt. will transfer supine/sit with CGA (Progressing)       Start:  05/04/24    Expected End:  05/06/24            Pt. will transfer sit/stand with FWW with MOD I with knee immobilizer on L LE (Progressing)       Start:  05/04/24    Expected End:  05/06/24            Pt. will complete stand pivot transfers with FWW with MOD I with knee immobilizer on L LE (Progressing)       Start:  05/04/24    Expected End:  05/06/24            Pt.will ambulate 40' with FWW with MOD I with knee immobilizer on L LE (Progressing)       Start:  05/04/24    Expected End:  05/06/24            Pt. will amb up/down curb step with FWW with SBA (Progressing)       Start:  05/04/24    Expected End:  05/06/24

## 2024-05-04 NOTE — PROGRESS NOTES
DAILY PROGRESS NOTE      POD: 1     Open Reduction Internal Fixation Patella Fracture          Patient doing well  Visit Vitals  BP 90/52   Pulse 55   Temp 35.4 °C (95.7 °F)   Resp 17      Temp (24hrs), Av.1 °C (96.9 °F), Min:35.3 °C (95.5 °F), Max:36.7 °C (98.1 °F)       Pain Control good  No chest pain or shortness of breath.  No calf pain    Exam:   Extremity shows neuro vascular status intact. Flexion and extension intact on extremity.  Calves soft and non-tender without evidence of DVT.        Labs reviewed:  Recent Results (from the past 24 hour(s))   CBC    Collection Time: 24  5:37 AM   Result Value Ref Range    WBC 15.8 (H) 4.4 - 11.3 x10*3/uL    nRBC 0.0 0.0 - 0.0 /100 WBCs    RBC 3.62 (L) 4.00 - 5.20 x10*6/uL    Hemoglobin 10.4 (L) 12.0 - 16.0 g/dL    Hematocrit 32.5 (L) 36.0 - 46.0 %    MCV 90 80 - 100 fL    MCH 28.7 26.0 - 34.0 pg    MCHC 32.0 32.0 - 36.0 g/dL    RDW 14.7 (H) 11.5 - 14.5 %    Platelets 283 150 - 450 x10*3/uL   Basic metabolic panel    Collection Time: 24  5:37 AM   Result Value Ref Range    Glucose 111 (H) 74 - 99 mg/dL    Sodium 138 136 - 145 mmol/L    Potassium 4.4 3.5 - 5.3 mmol/L    Chloride 106 98 - 107 mmol/L    Bicarbonate 26 21 - 32 mmol/L    Anion Gap 10 10 - 20 mmol/L    Urea Nitrogen 25 (H) 6 - 23 mg/dL    Creatinine 0.90 0.50 - 1.05 mg/dL    eGFR 73 >60 mL/min/1.73m*2    Calcium 8.3 (L) 8.6 - 10.3 mg/dL       I&O  I/O last 3 completed shifts:  In: 2339.2 (20.3 mL/kg) [I.V.:839.2 (7.3 mL/kg); IV Piggyback:1500]  Out: 900 (7.8 mL/kg) [Urine:900 (0.2 mL/kg/hr)]  Dosing Weight: 115.4 kg       Assessment:      Acute postoperative pain: Reports mild to moderate pain to operative extremity.  Exacerbated by movement, relieved by rest ice and pain medication.  Continue current pain management.    Acute postoperative blood loss anemia secondary to expected surgical blood loss: Hemoglobin this a.m. 10.4.  Patient asymptomatic, remains hemodynamically stable with no  signs of active bleeding.    Leukocytosis: WBC count this morning 15.8.  No acute signs of infection.  Patient afebrile, remains hemodynamically stable denies any cough abdominal pain or urinary symptoms.  Transient elevation in white blood count is most likely secondary to stress.      Plan:    Discharge today with two week follow up in clinic   Continue pain control  PT/OT: WBAT with knee locked in extension with immobilizer to operative extremity   Discharge planning:  Plans to return to home with no needs. SW and TCC following for discharge planning.     Laverne Santos, APRN-CNP   5/4/2024 8:31 AM

## 2024-05-04 NOTE — PROGRESS NOTES
Physical Therapy    Physical Therapy Evaluation    Patient Name: Lisa Guerrero  MRN: [id number]  Today's Date: 5/4/2024   Time Calculation  Start Time: 0837  Stop Time: 0901  Time Calculation (min): 24 min    Assessment/Plan   PT Assessment  PT Assessment Results: Decreased strength, Decreased endurance, Impaired balance, Decreased mobility, Pain, Orthopedic restrictions  Rehab Prognosis: Good  Evaluation/Treatment Tolerance: Patient limited by pain  Medical Staff Made Aware: Yes  Strengths: Support of Caregivers, Premorbid level of function, Living arrangement secure, Housing layout, Attitude of self, Access to adaptive/assistive products  Barriers to Participation: Comorbidities  End of Session Communication: Bedside nurse  End of Session Patient Position: Alarm on, Up in chair (Call ligth within reach)  IP OR SWING BED PT PLAN  Inpatient or Swing Bed: Inpatient  PT Plan  Treatment/Interventions: Bed mobility, Transfer training, Gait training, Balance training, Endurance training  PT Plan: Skilled PT  PT Frequency: Daily  PT Discharge Recommendations: Low intensity level of continued care  PT Recommended Transfer Status: Assist x1, Assistive device  Physical Therapy eval completed per MD requisition. P.T. recommendations as outlined above. Recommend D/C from acute care when medically appropriate as deemed by medical staff.    Subjective       General Visit Information:  General  Reason for Referral: impaired mobility, s/p ORIF L patella  Referred By: Dr. Mcghee (PT only 5/3)  Past Medical History Relevant to Rehab: includes: CHF, L ADAMS, gout, marcus, hepatomegaly, smoker  Family/Caregiver Present: No  Prior to Session Communication: Bedside nurse  Patient Position Received: Bed, 2 rail up, Alarm off, not on at start of session  Preferred Learning Style: auditory, verbal  General Comment: Pt. is a 60yo who presented to [facility] on 5/3/2024 for a scheduled ORIF L patella by Dr. Mcghee. Pt. s/p fall on 4/23/2024 during which  she fractured L patella.    Home Living:  Home Living  Home Living Comments: Pt. lives with spouse, son and grandson (someone always home) in a 2 level house with 1 TONY with HR. Bed/bath on 1st floor with tub shower without a seat or grab bars. Laundry in basement.    Prior Level of Function:  Prior Function Per Pt/Caregiver Report  Prior Function Comments: Prior to fall Pt. amb without AD. Since fall with patella fracture, Pt. amb with FWW.  Pt was I with ADLs PTA. Prior to fall, Pt. completed IADLs but family has been assisting with IADLs since fall.  Pt. denied any other falls in last 3 months. Pt. drove PTA.    Precautions:  Precautions  LE Weight Bearing Status:  (WBAT L LE)  Post-Surgical Precautions:  (Knee immobilizer L LE when OOB and no ROM L knee per ortho CNP)  Braces Applied: Knee immobilizer L LE         Objective     Pain:  Pain Assessment  Pain Assessment: 0-10  Pain Score: 7  Pain Type: Surgical pain  Pain Location: Knee  Pain Orientation: Left  Pain Descriptors: Burning  Pain Interventions: Repositioned (Pt. refused ice)    Cognition:  Cognition  Overall Cognitive Status: Within Functional Limits  Orientation Level: Oriented X4    General Assessments:  General Observation  General Observation: Knee immobilizer L LE   Activity Tolerance  Endurance: Decreased tolerance for upright activites                 Dynamic Sitting Balance  Dynamic Sitting-Comments: Good static and dynamic sitting balance  Dynamic Standing Balance  Dynamic Standing-Comments: Fair+ static and dynamic standing balance    Functional Assessments:     Bed Mobility  Bed Mobility: Yes  Bed Mobility 1  Bed Mobility 1: Supine to sitting  Level of Assistance 1: Minimum assistance  Bed Mobility Comments 1: A to maneuver L LEs  Transfers  Transfer: Yes  Transfer 1  Technique 1: Sit to stand  Transfer Device 1:  (FWW)  Transfer Level of Assistance 1: Moderate assistance  Trials/Comments 1: A for lifting, transferring weight over feet,  steadying. VC's for hand placement  Transfers 2  Technique 2: Stand to sit  Transfer Device 2:  (FWW)  Transfer Level of Assistance 2: Minimum assistance  Trials/Comments 2: A to control descent. VC's for hand placement.  Transfers 3  Technique 3: Stand pivot  Transfer Device 3:  (FWW)  Transfer Level of Assistance 3: Minimum assistance  Trials/Comments 3: A for balance, safety  Ambulation/Gait Training  Ambulation/Gait Training Performed: Yes  Ambulation/Gait Training 1  Surface 1: Level tile  Device 1: Rolling walker  Gait Support Devices: Knee immobilizer  Assistance 1: Contact guard  Quality of Gait 1: Narrow base of support (slow, very antalgic gait with shortened step lengths. Pt. initially used toes on L foot to creep L LE forward and eventually was able to advance L LE.)  Comments/Distance (ft) 1: 20' x 2; CGA for balance, safety  Stairs  Stairs: No       Extremity/Trunk Assessments:        RLE   RLE : Within Functional Limits  LLE   LLE :  (Hip and ankle WFL AROM, strength not assessed)    Outcome Measures:  [facility] Basic Mobility  Turning from your back to your side while in a flat bed without using bedrails: A little  Moving from lying on your back to sitting on the side of a flat bed without using bedrails: A little  Moving to and from bed to chair (including a wheelchair): A little  Standing up from a chair using your arms (e.g. wheelchair or bedside chair): A lot  To walk in hospital room: A little  Climbing 3-5 steps with railing: Total  Basic Mobility - Total Score: 15                            Goals:  Encounter Problems       Encounter Problems (Active)       PT Problem       Pt. will transfer supine/sit with CGA (Progressing)       Start:  05/04/24    Expected End:  05/06/24            Pt. will transfer sit/stand with FWW with MOD I with knee immobilizer on L LE (Progressing)       Start:  05/04/24    Expected End:  05/06/24            Pt. will complete stand pivot transfers with FWW with MOD I with  knee immobilizer on L LE (Progressing)       Start:  05/04/24    Expected End:  05/06/24            Pt.will ambulate 40' with FWW with MOD I with knee immobilizer on L LE (Progressing)       Start:  05/04/24    Expected End:  05/06/24            Pt. will amb up/down curb step with FWW with SBA (Not Progressing)       Start:  05/04/24    Expected End:  05/06/24                 Education Documentation  Precautions, taught by Corky Sterling PT at 5/4/2024  1:20 PM.  Learner: Patient  Readiness: Acceptance  Method: Explanation  Response: Verbalizes Understanding, Needs Reinforcement  Comment: Role of PT, transfers, amb, safety, Knee imobilizer, PT POC    Mobility Training, taught by Corky Sterling, PT at 5/4/2024  1:20 PM.  Learner: Patient  Readiness: Acceptance  Method: Explanation  Response: Verbalizes Understanding, Needs Reinforcement  Comment: Role of PT, transfers, amb, safety, Knee imobilizer, PT POC

## 2024-05-06 ENCOUNTER — TELEPHONE (OUTPATIENT)
Dept: PRIMARY CARE | Facility: CLINIC | Age: 59
End: 2024-05-06
Payer: COMMERCIAL

## 2024-05-06 ENCOUNTER — TELEPHONE (OUTPATIENT)
Dept: ORTHOPEDIC SURGERY | Facility: CLINIC | Age: 59
End: 2024-05-06
Payer: COMMERCIAL

## 2024-05-06 NOTE — DISCHARGE SUMMARY
Discharge summary  This patient Lisa Guerrero was admitted to the hospital on 5/3/2024  after undergoing Procedure(s) (LRB):  Open Reduction Internal Fixation Patella Fracture (Left) without complications that morning.    During the postoperative period,while in hospital, patient was medically managed by the hospitalist. Please see medial notes and H&P for patients additional diagnoses.  Ortho agrees with all medical diagnoses and treatments while patient in hospital.  No significant or unexpected findings or abnormal ortho imaging were noted during the hospital stay    Hospital course      Patient tolerated surgical procedure well and there was no complications. Patient progressed adequately through their recovery during hospital stay including PT and rehabilitation.    Patient was then D/C on 5/4/2024 to home  in stable condition.  Patient was instructed on the use of pain medications, the signs and symptoms of infection, and was given our number to call should they have any questions or concerns following discharge.    Based on my clinical judgment, the patient was provided with a 7-day prescription for opioid medication at 30 MED, indicated for treatment of acute pain in the setting of recent surgery. OARRS report was run and has demonstrated an appropriate time course.  The patient has been provided with counseling pertaining to safe use of opioid medication.      Patient may WBAT in knee immobilizer with leg in extension until cleared by surgeon for ROM to operative extremity with use of walker for assistance with ambulation   Aquacel dressing to be removed pod7 and incision left open to air  Home xarelto resumed pod1  Follow up with surgeon in 2 weeks

## 2024-05-06 NOTE — TELEPHONE ENCOUNTER
Patient is requesting something for the recent diagnosis of GOUT.  Blood tests have verified the diagnosis.    Patient was going to start a steroid, however she fell, broke her knee cap.  She had to have surgery to repair the knee cap and at that time she was told not to take the steroid.    Patient is struggling horribly with the pain in her toes, fingers and other joints with gout concerns.    At this time she is unable to get out of the house due the recent surgery.  She requesting a RX to help.      Your follow up appointment is May 24th.

## 2024-05-06 NOTE — TELEPHONE ENCOUNTER
"Pt calling had ORIF sx on Friday and today she heard/felt a \"pop\" and is concerned something happened. States she is not in any pain from that, but she did have a muscle spasm earlier that was extremely painful. Otherwise not in any pain. Also she has questions on the bandage as it is not currently bleeding but the bandage is all bloody from before. Not sure if needs changed or what to do. Please call her at [phone],  "

## 2024-05-07 DIAGNOSIS — S82.033A: ICD-10-CM

## 2024-05-07 DIAGNOSIS — Z87.81 HISTORY OF PATELLAR FRACTURE: ICD-10-CM

## 2024-05-07 RX ORDER — ONDANSETRON 4 MG/1
4 TABLET, FILM COATED ORAL EVERY 8 HOURS PRN
Qty: 20 TABLET | Refills: 0 | Status: SHIPPED | OUTPATIENT
Start: 2024-05-07 | End: 2024-05-14

## 2024-05-07 RX ORDER — OXYCODONE HYDROCHLORIDE 5 MG/1
5 TABLET ORAL EVERY 6 HOURS PRN
Qty: 28 TABLET | Refills: 0 | Status: SHIPPED | OUTPATIENT
Start: 2024-05-07 | End: 2024-05-14

## 2024-05-07 NOTE — TELEPHONE ENCOUNTER
Spoke to patient, notified her per Dr. Mcghee if the bandage is bloody she can take it off and cover with 4x4 and ace wrap. Pt understood

## 2024-05-07 NOTE — TELEPHONE ENCOUNTER
Pt  called back and she has a question about the dressing changes now that the waterproof one is off, says  her instructions , say something different

## 2024-05-07 NOTE — TELEPHONE ENCOUNTER
Spoke to patient, discussed to change dressing and that we will follow up with imaging on post op visit.

## 2024-05-09 ENCOUNTER — OFFICE VISIT (OUTPATIENT)
Dept: WOUND CARE | Facility: CLINIC | Age: 59
End: 2024-05-09
Payer: COMMERCIAL

## 2024-05-09 PROCEDURE — 29581 APPL MULTLAYER CMPRN SYS LEG: CPT | Performed by: PLASTIC SURGERY

## 2024-05-09 PROCEDURE — 3008F BODY MASS INDEX DOCD: CPT | Performed by: PLASTIC SURGERY

## 2024-05-09 PROCEDURE — 99214 OFFICE O/P EST MOD 30 MIN: CPT | Mod: 25

## 2024-05-09 PROCEDURE — 99213 OFFICE O/P EST LOW 20 MIN: CPT | Performed by: PLASTIC SURGERY

## 2024-05-09 PROCEDURE — 29581 APPL MULTLAYER CMPRN SYS LEG: CPT

## 2024-05-10 DIAGNOSIS — E79.0 ELEVATED URIC ACID IN BLOOD: Primary | ICD-10-CM

## 2024-05-10 RX ORDER — COLCHICINE 0.6 MG/1
1.2 TABLET ORAL ONCE
Qty: 6 TABLET | Refills: 0 | Status: SHIPPED | OUTPATIENT
Start: 2024-05-10 | End: 2024-05-10

## 2024-05-14 ENCOUNTER — HOSPITAL ENCOUNTER (OUTPATIENT)
Dept: RADIOLOGY | Facility: CLINIC | Age: 59
Discharge: HOME | End: 2024-05-14
Payer: COMMERCIAL

## 2024-05-14 ENCOUNTER — OFFICE VISIT (OUTPATIENT)
Dept: ORTHOPEDIC SURGERY | Facility: CLINIC | Age: 59
End: 2024-05-14
Payer: COMMERCIAL

## 2024-05-14 DIAGNOSIS — S82.002A CLOSED DISPLACED FRACTURE OF LEFT PATELLA, UNSPECIFIED FRACTURE MORPHOLOGY, INITIAL ENCOUNTER: ICD-10-CM

## 2024-05-14 PROCEDURE — 3008F BODY MASS INDEX DOCD: CPT | Performed by: ORTHOPAEDIC SURGERY

## 2024-05-14 PROCEDURE — L1833 KO ADJ JNT POS R SUP PRE OTS: HCPCS | Performed by: ORTHOPAEDIC SURGERY

## 2024-05-14 PROCEDURE — 73560 X-RAY EXAM OF KNEE 1 OR 2: CPT | Mod: LT

## 2024-05-14 PROCEDURE — 73560 X-RAY EXAM OF KNEE 1 OR 2: CPT | Mod: LEFT SIDE | Performed by: RADIOLOGY

## 2024-05-14 PROCEDURE — 99024 POSTOP FOLLOW-UP VISIT: CPT | Performed by: ORTHOPAEDIC SURGERY

## 2024-05-14 NOTE — PROGRESS NOTES
History of Present Illness  Patient returns today for evaluation of left patella fracture status post ORIF on 5/3/2024.  The patient notes improvement in pain.  The patient denies any significant numbness or tingling of calf pain.  The patients pain is moderate.  She is seeing wound care with Dr. Reyes.    Physical Examination:  Left knee:  Per recent image, as well as wound care's instruction to not remove brace, incision healing nicely, there is some erythema noted about the fracture blister.  There is some drainage noted, serosanguineous.  Intact flexion and extension of digits  Neurovascular exam normal distally  2+ dorsalis pedis pulse and good cap refill    Radiographs  Plain films left knee today reveal status post ORIF left patella with appropriate position alignment of hardware.    Assessment:  Patient status post open reduction and internal fixation of left patella    Plan:  Immobilization: Recovery knee brace/knee immobilizer, recovery brace to about 0 to 30 degrees of motion.  Weight bearing:  weight bearing as tolerated in extension with brace locked at 0.  Reviewed rehab /return to activities  Continue appointments with wound care specialist.  We recommended they remove staples at upcoming visit on Thursday of this week.  Follow up:  1 month     In a face to face encounter, I evaluated the patient and performed a physical examination, discussed pertinent diagnostic studies if indicated and discussed diagnosis and management strategies with both the patient and physician assistant / nurse practitioner.  I reviewed the PA/NP's note and agree with the documented findings and plan of care.        Favian Mcghee MD

## 2024-05-15 ENCOUNTER — OFFICE VISIT (OUTPATIENT)
Dept: WOUND CARE | Facility: CLINIC | Age: 59
End: 2024-05-15
Payer: COMMERCIAL

## 2024-05-15 PROCEDURE — 11042 DBRDMT SUBQ TIS 1ST 20SQCM/<: CPT

## 2024-05-16 ENCOUNTER — APPOINTMENT (OUTPATIENT)
Dept: WOUND CARE | Facility: CLINIC | Age: 59
End: 2024-05-16
Payer: COMMERCIAL

## 2024-05-22 ENCOUNTER — OFFICE VISIT (OUTPATIENT)
Dept: WOUND CARE | Facility: CLINIC | Age: 59
End: 2024-05-22
Payer: COMMERCIAL

## 2024-05-22 PROCEDURE — 99213 OFFICE O/P EST LOW 20 MIN: CPT | Mod: 25

## 2024-05-22 PROCEDURE — 11042 DBRDMT SUBQ TIS 1ST 20SQCM/<: CPT

## 2024-05-23 ENCOUNTER — TELEPHONE (OUTPATIENT)
Dept: ORTHOPEDIC SURGERY | Facility: CLINIC | Age: 59
End: 2024-05-23
Payer: COMMERCIAL

## 2024-05-23 NOTE — TELEPHONE ENCOUNTER
Pt LVM the brace she received it too small and she would like a larger one.     Per [facility]i [facility], pt can come into the office this afternoon and she can take a look at it. Called pt and left detailed message offering her to come in to the [place] office today by 4pm. If she was unable to make it please call back and we would have someone call her back to discuss.

## 2024-05-28 ENCOUNTER — OFFICE VISIT (OUTPATIENT)
Dept: WOUND CARE | Facility: CLINIC | Age: 59
End: 2024-05-28
Payer: COMMERCIAL

## 2024-05-28 PROCEDURE — 11042 DBRDMT SUBQ TIS 1ST 20SQCM/<: CPT

## 2024-06-04 ENCOUNTER — OFFICE VISIT (OUTPATIENT)
Dept: WOUND CARE | Facility: CLINIC | Age: 59
End: 2024-06-04
Payer: COMMERCIAL

## 2024-06-04 ENCOUNTER — OFFICE VISIT (OUTPATIENT)
Dept: ORTHOPEDIC SURGERY | Facility: CLINIC | Age: 59
End: 2024-06-04
Payer: COMMERCIAL

## 2024-06-04 ENCOUNTER — HOSPITAL ENCOUNTER (OUTPATIENT)
Dept: RADIOLOGY | Facility: CLINIC | Age: 59
Discharge: HOME | End: 2024-06-04
Payer: COMMERCIAL

## 2024-06-04 DIAGNOSIS — S82.002A CLOSED DISPLACED FRACTURE OF LEFT PATELLA, UNSPECIFIED FRACTURE MORPHOLOGY, INITIAL ENCOUNTER: Primary | ICD-10-CM

## 2024-06-04 DIAGNOSIS — S82.002A CLOSED DISPLACED FRACTURE OF LEFT PATELLA, UNSPECIFIED FRACTURE MORPHOLOGY, INITIAL ENCOUNTER: ICD-10-CM

## 2024-06-04 PROCEDURE — 3008F BODY MASS INDEX DOCD: CPT | Performed by: ORTHOPAEDIC SURGERY

## 2024-06-04 PROCEDURE — 99024 POSTOP FOLLOW-UP VISIT: CPT | Performed by: ORTHOPAEDIC SURGERY

## 2024-06-04 PROCEDURE — 11042 DBRDMT SUBQ TIS 1ST 20SQCM/<: CPT

## 2024-06-04 PROCEDURE — 73560 X-RAY EXAM OF KNEE 1 OR 2: CPT | Mod: LT

## 2024-06-04 PROCEDURE — 73560 X-RAY EXAM OF KNEE 1 OR 2: CPT | Mod: LEFT SIDE | Performed by: RADIOLOGY

## 2024-06-04 NOTE — PROGRESS NOTES
History of Present Illness  Patient returns today for evaluation of her left knee.  The patient notes improvement in pain.  The patient denies any significant numbness or tingling of calf pain.  The patients pain is moderate, going to wound clinic    Physical Examination:  Left knee:  No surrounding erythema, delayed healing central aspect of the incision  Intact flexion and extension of digits  Neurovascular exam normal distally  2+ dorsalis pedis pulse and good cap refill    Radiographs  Comminuted fracture some evidence of healing    Assessment:  Patient status post open reduction and internal fixation of patella complicated by large blister preop and wound issues    Plan:  Immobilization: Brace  Weight bearing:  weight bearing as tolerated with brace in extension  Reviewed rehab /return to activities, okay for range of motion to 60 of flexion  Discussed she may benefit from a wound VAC  Follow up:  3 weeks

## 2024-06-11 ENCOUNTER — OFFICE VISIT (OUTPATIENT)
Dept: WOUND CARE | Facility: CLINIC | Age: 59
End: 2024-06-11
Payer: COMMERCIAL

## 2024-06-11 PROCEDURE — 11042 DBRDMT SUBQ TIS 1ST 20SQCM/<: CPT

## 2024-06-13 ENCOUNTER — OFFICE VISIT (OUTPATIENT)
Dept: ORTHOPEDIC SURGERY | Facility: CLINIC | Age: 59
End: 2024-06-13
Payer: COMMERCIAL

## 2024-06-13 DIAGNOSIS — G89.18 POST-OP PAIN: Primary | ICD-10-CM

## 2024-06-13 DIAGNOSIS — S82.002A CLOSED DISPLACED FRACTURE OF LEFT PATELLA, UNSPECIFIED FRACTURE MORPHOLOGY, INITIAL ENCOUNTER: Primary | ICD-10-CM

## 2024-06-13 PROBLEM — T81.31XA DISRUPTION OF EXTERNAL OPERATION (SURGICAL) WOUND, NOT ELSEWHERE CLASSIFIED, INITIAL ENCOUNTER: Status: ACTIVE | Noted: 2024-06-13

## 2024-06-13 PROCEDURE — 3008F BODY MASS INDEX DOCD: CPT | Performed by: STUDENT IN AN ORGANIZED HEALTH CARE EDUCATION/TRAINING PROGRAM

## 2024-06-13 PROCEDURE — 99024 POSTOP FOLLOW-UP VISIT: CPT | Performed by: STUDENT IN AN ORGANIZED HEALTH CARE EDUCATION/TRAINING PROGRAM

## 2024-06-13 RX ORDER — SODIUM CHLORIDE, SODIUM LACTATE, POTASSIUM CHLORIDE, CALCIUM CHLORIDE 600; 310; 30; 20 MG/100ML; MG/100ML; MG/100ML; MG/100ML
100 INJECTION, SOLUTION INTRAVENOUS CONTINUOUS
Status: CANCELLED | OUTPATIENT
Start: 2024-06-13

## 2024-06-13 RX ORDER — CELECOXIB 50 MG/1
200 CAPSULE ORAL ONCE
Status: CANCELLED | OUTPATIENT
Start: 2024-06-13 | End: 2024-06-13

## 2024-06-13 NOTE — PREPROCEDURE INSTRUCTIONS
Reviewed pre-op instructions with patient including NPO after midnight, must have , hospital and check in location, and day of surgery routine. Patient aware to take Metoprolol AM of procedure; and aware to hold Eliquis as instructed.

## 2024-06-13 NOTE — PROGRESS NOTES
History of Present Illness:   Patient returns today status post ORIF patella    Review of Systems   GENERAL: Negative for malaise, significant weight loss, fever  MUSCULOSKELETAL: see HPI  NEURO:  Negative    Physical Examination:  Left knee open incision, some exposed hardware exudate noted.  No significant surrounding erythema or induration  Negative Lorri test    Imaging:  Deferred    Assessment:   Patient status post left patella ORIF with wound complication    Plan:  We again reviewed the importance of nutrition and smoking cessation.  We discussed that a wound VAC would have been useful but was not placed by the wound clinic, discussed I&D and wound VAC placement in the OR.  Will consult infectious diseases.

## 2024-06-13 NOTE — H&P (VIEW-ONLY)
History Of Present Illness  Lisa Guerrero is a 59 y.o. female presenting with left knee pain and wound dehiscence.     Past Medical History  She has a past medical history of Acute on chronic diastolic heart failure with preserved ejection fraction (Multi) (04/26/2023), Anxiety, Arthritis, CHF (congestive heart failure) (Multi), Depression, Factor V Leiden (Multi), Gout, Hepatomegaly, not elsewhere classified, History of blood clots, Psoriasis, Sleep apnea, Snores, Unsteady gait, and Wears glasses.    Surgical History  She has a past surgical history that includes Tubal ligation; Cholecystectomy; Total hip arthroplasty (Left); and Cardiac catheterization.     Social History  She reports that she has been smoking cigarettes. She has never used smokeless tobacco. She reports current alcohol use. She reports that she does not use drugs.    Family History  Family History   Problem Relation Name Age of Onset    Aneurysm Mother      No Known Problems Father          Allergies  Ceftriaxone, Codeine, Penicillins, Percocet [oxycodone-acetaminophen], Sacubitril-valsartan, Sulfamethoxazole-trimethoprim, Hydrogen peroxide, and Latex    Review of Systems   CONSTITUTIONAL: Denies weight loss, fever and chills.    - HEENT: Denies changes in vision and hearing.    - RESPIRATORY: Denies SOB and cough. Some baseline SoB smoking history.     - CV: Denies palpitations and CP.    - GI: Denies abdominal pain, nausea, vomiting and diarrhea.    - : Denies dysuria and urinary frequency.    - MSK: See physical exam findings     - SKIN: Erythema and ecchymosis noted of left lower extremity    - NEUROLOGICAL: Denies headache and syncope.    - PSYCHIATRIC: Denies recent changes in mood. Denies anxiety and depression.      Physical Exam  - GENERAL: Alert and oriented x 3. No acute distress. Well-nourished.    - EYES: EOMI. Anicteric.    - HENT: Moist mucous membranes. No scleral icterus. No cervical lymphadenopathy.    - LUNGS: Clear to  auscultation bilaterally. No accessory muscle use.    - CARDIOVASCULAR: Regular rate and rhythm. No murmur. No JVD.    - ABDOMEN: Soft, non-tender and non-distended. No palpable masses.    - EXTREMITIES: Large open area of dehiscence noted measuring 3x4 inches grade 4 down to plate, plate is visible with drainage noted. Significant surrounding erythema and ecchymosis.      - NEUROLOGIC: No focal neurological deficits. CN II-XII grossly intact, but not individually tested.    - PSYCHIATRIC: Cooperative. Appropriate mood and affect.      Last Recorded Vitals  There were no vitals taken for this visit.    Relevant Results      Scheduled medications    Continuous medications    PRN medications    No results found for this or any previous visit (from the past 24 hour(s)).    Assessment/Plan   There are no diagnoses linked to this encounter.    Discussed left knee irrigation and debridement with wound vac placement, she is agreeable and would like to proceed.        I spent 10 minutes in the professional and overall care of this patient.      Estiven Goodrich PA-C

## 2024-06-14 ENCOUNTER — ANESTHESIA EVENT (OUTPATIENT)
Dept: OPERATING ROOM | Facility: HOSPITAL | Age: 59
End: 2024-06-14
Payer: COMMERCIAL

## 2024-06-14 ENCOUNTER — ANESTHESIA (OUTPATIENT)
Dept: OPERATING ROOM | Facility: HOSPITAL | Age: 59
End: 2024-06-14
Payer: COMMERCIAL

## 2024-06-14 ENCOUNTER — HOSPITAL ENCOUNTER (OUTPATIENT)
Facility: HOSPITAL | Age: 59
DRG: 920 | End: 2024-06-14
Attending: ORTHOPAEDIC SURGERY | Admitting: ORTHOPAEDIC SURGERY
Payer: COMMERCIAL

## 2024-06-14 ENCOUNTER — HOSPITAL ENCOUNTER (OUTPATIENT)
Dept: CARDIOLOGY | Facility: HOSPITAL | Age: 59
Discharge: HOME | End: 2024-06-14
Payer: COMMERCIAL

## 2024-06-14 DIAGNOSIS — M25.562 ACUTE PAIN OF LEFT KNEE: ICD-10-CM

## 2024-06-14 DIAGNOSIS — T81.31XA DISRUPTION OF EXTERNAL OPERATION (SURGICAL) WOUND, NOT ELSEWHERE CLASSIFIED, INITIAL ENCOUNTER: ICD-10-CM

## 2024-06-14 DIAGNOSIS — A49.8 PROTEUS MIRABILIS INFECTION: Primary | ICD-10-CM

## 2024-06-14 DIAGNOSIS — Z98.890 STATUS POST INCISION AND DRAINAGE: ICD-10-CM

## 2024-06-14 PROBLEM — M25.569 KNEE PAIN: Status: ACTIVE | Noted: 2024-06-14

## 2024-06-14 LAB
ALBUMIN SERPL BCP-MCNC: 3.9 G/DL (ref 3.4–5)
ALP SERPL-CCNC: 69 U/L (ref 33–136)
ALT SERPL W P-5'-P-CCNC: 7 U/L (ref 7–45)
ANION GAP SERPL CALC-SCNC: 10 MMOL/L (ref 10–20)
AST SERPL W P-5'-P-CCNC: 5 U/L (ref 9–39)
ATRIAL RATE: 69 BPM
BASOPHILS # BLD AUTO: 0.04 X10*3/UL (ref 0–0.1)
BASOPHILS NFR BLD AUTO: 0.6 %
BILIRUB SERPL-MCNC: 0.3 MG/DL (ref 0–1.2)
BUN SERPL-MCNC: 16 MG/DL (ref 6–23)
CALCIUM SERPL-MCNC: 9.4 MG/DL (ref 8.6–10.3)
CHLORIDE SERPL-SCNC: 113 MMOL/L (ref 98–107)
CO2 SERPL-SCNC: 24 MMOL/L (ref 21–32)
CREAT SERPL-MCNC: 0.85 MG/DL (ref 0.5–1.05)
EGFRCR SERPLBLD CKD-EPI 2021: 78 ML/MIN/1.73M*2
EOSINOPHIL # BLD AUTO: 0.35 X10*3/UL (ref 0–0.7)
EOSINOPHIL NFR BLD AUTO: 4.9 %
ERYTHROCYTE [DISTWIDTH] IN BLOOD BY AUTOMATED COUNT: 16.4 % (ref 11.5–14.5)
GLUCOSE SERPL-MCNC: 91 MG/DL (ref 74–99)
HCT VFR BLD AUTO: 44.9 % (ref 36–46)
HGB BLD-MCNC: 14.3 G/DL (ref 12–16)
IMM GRANULOCYTES # BLD AUTO: 0.01 X10*3/UL (ref 0–0.7)
IMM GRANULOCYTES NFR BLD AUTO: 0.1 % (ref 0–0.9)
INR PPP: 1.1 (ref 0.9–1.1)
LYMPHOCYTES # BLD AUTO: 1.38 X10*3/UL (ref 1.2–4.8)
LYMPHOCYTES NFR BLD AUTO: 19.2 %
MCH RBC QN AUTO: 28.6 PG (ref 26–34)
MCHC RBC AUTO-ENTMCNC: 31.8 G/DL (ref 32–36)
MCV RBC AUTO: 90 FL (ref 80–100)
MONOCYTES # BLD AUTO: 0.38 X10*3/UL (ref 0.1–1)
MONOCYTES NFR BLD AUTO: 5.3 %
NEUTROPHILS # BLD AUTO: 5.02 X10*3/UL (ref 1.2–7.7)
NEUTROPHILS NFR BLD AUTO: 69.9 %
NRBC BLD-RTO: 0 /100 WBCS (ref 0–0)
P AXIS: 42 DEGREES
P OFFSET: 180 MS
P ONSET: 131 MS
PLATELET # BLD AUTO: 191 X10*3/UL (ref 150–450)
POTASSIUM SERPL-SCNC: 4.4 MMOL/L (ref 3.5–5.3)
PR INTERVAL: 146 MS
PROT SERPL-MCNC: 6.9 G/DL (ref 6.4–8.2)
PROTHROMBIN TIME: 12 SECONDS (ref 9.8–12.8)
Q ONSET: 204 MS
QRS COUNT: 11 BEATS
QRS DURATION: 102 MS
QT INTERVAL: 420 MS
QTC CALCULATION(BAZETT): 450 MS
QTC FREDERICIA: 440 MS
R AXIS: -28 DEGREES
RBC # BLD AUTO: 5 X10*6/UL (ref 4–5.2)
SODIUM SERPL-SCNC: 143 MMOL/L (ref 136–145)
T AXIS: -10 DEGREES
T OFFSET: 414 MS
VENTRICULAR RATE: 69 BPM
WBC # BLD AUTO: 7.2 X10*3/UL (ref 4.4–11.3)

## 2024-06-14 PROCEDURE — 3600000002 HC OR TIME - INITIAL BASE CHARGE - PROCEDURE LEVEL TWO: Performed by: ORTHOPAEDIC SURGERY

## 2024-06-14 PROCEDURE — 2500000004 HC RX 250 GENERAL PHARMACY W/ HCPCS (ALT 636 FOR OP/ED): Performed by: ANESTHESIOLOGY

## 2024-06-14 PROCEDURE — 2500000004 HC RX 250 GENERAL PHARMACY W/ HCPCS (ALT 636 FOR OP/ED): Performed by: STUDENT IN AN ORGANIZED HEALTH CARE EDUCATION/TRAINING PROGRAM

## 2024-06-14 PROCEDURE — 3700000001 HC GENERAL ANESTHESIA TIME - INITIAL BASE CHARGE: Performed by: ORTHOPAEDIC SURGERY

## 2024-06-14 PROCEDURE — 80053 COMPREHEN METABOLIC PANEL: CPT | Performed by: ORTHOPAEDIC SURGERY

## 2024-06-14 PROCEDURE — 2500000001 HC RX 250 WO HCPCS SELF ADMINISTERED DRUGS (ALT 637 FOR MEDICARE OP)

## 2024-06-14 PROCEDURE — 3700000002 HC GENERAL ANESTHESIA TIME - EACH INCREMENTAL 1 MINUTE: Performed by: ORTHOPAEDIC SURGERY

## 2024-06-14 PROCEDURE — 2500000004 HC RX 250 GENERAL PHARMACY W/ HCPCS (ALT 636 FOR OP/ED): Performed by: NURSE ANESTHETIST, CERTIFIED REGISTERED

## 2024-06-14 PROCEDURE — 2500000001 HC RX 250 WO HCPCS SELF ADMINISTERED DRUGS (ALT 637 FOR MEDICARE OP): Performed by: STUDENT IN AN ORGANIZED HEALTH CARE EDUCATION/TRAINING PROGRAM

## 2024-06-14 PROCEDURE — 93005 ELECTROCARDIOGRAM TRACING: CPT

## 2024-06-14 PROCEDURE — 2500000005 HC RX 250 GENERAL PHARMACY W/O HCPCS: Performed by: NURSE ANESTHETIST, CERTIFIED REGISTERED

## 2024-06-14 PROCEDURE — 85025 COMPLETE CBC W/AUTO DIFF WBC: CPT | Performed by: ORTHOPAEDIC SURGERY

## 2024-06-14 PROCEDURE — 2500000001 HC RX 250 WO HCPCS SELF ADMINISTERED DRUGS (ALT 637 FOR MEDICARE OP): Performed by: ORTHOPAEDIC SURGERY

## 2024-06-14 PROCEDURE — 2500000005 HC RX 250 GENERAL PHARMACY W/O HCPCS: Performed by: ORTHOPAEDIC SURGERY

## 2024-06-14 PROCEDURE — 85610 PROTHROMBIN TIME: CPT | Performed by: ORTHOPAEDIC SURGERY

## 2024-06-14 PROCEDURE — 0J9P00Z DRAINAGE OF LEFT LOWER LEG SUBCUTANEOUS TISSUE AND FASCIA WITH DRAINAGE DEVICE, OPEN APPROACH: ICD-10-PCS

## 2024-06-14 PROCEDURE — 2720000007 HC OR 272 NO HCPCS: Performed by: ORTHOPAEDIC SURGERY

## 2024-06-14 PROCEDURE — 2500000004 HC RX 250 GENERAL PHARMACY W/ HCPCS (ALT 636 FOR OP/ED): Performed by: ORTHOPAEDIC SURGERY

## 2024-06-14 PROCEDURE — 87070 CULTURE OTHR SPECIMN AEROBIC: CPT | Mod: ELYLAB | Performed by: STUDENT IN AN ORGANIZED HEALTH CARE EDUCATION/TRAINING PROGRAM

## 2024-06-14 PROCEDURE — 7100000001 HC RECOVERY ROOM TIME - INITIAL BASE CHARGE: Performed by: ORTHOPAEDIC SURGERY

## 2024-06-14 PROCEDURE — 13160 SEC CLSR SURG WND/DEHSN XTN: CPT | Performed by: STUDENT IN AN ORGANIZED HEALTH CARE EDUCATION/TRAINING PROGRAM

## 2024-06-14 PROCEDURE — 2500000002 HC RX 250 W HCPCS SELF ADMINISTERED DRUGS (ALT 637 FOR MEDICARE OP, ALT 636 FOR OP/ED)

## 2024-06-14 PROCEDURE — 7100000011 HC EXTENDED STAY RECOVERY HOURLY - NURSING UNIT

## 2024-06-14 PROCEDURE — 97605 NEG PRS WND THER DME<=50SQCM: CPT | Performed by: ORTHOPAEDIC SURGERY

## 2024-06-14 PROCEDURE — 36415 COLL VENOUS BLD VENIPUNCTURE: CPT | Performed by: ORTHOPAEDIC SURGERY

## 2024-06-14 PROCEDURE — 3E1U38Z IRRIGATION OF JOINTS USING IRRIGATING SUBSTANCE, PERCUTANEOUS APPROACH: ICD-10-PCS

## 2024-06-14 PROCEDURE — 13160 SEC CLSR SURG WND/DEHSN XTN: CPT | Performed by: ORTHOPAEDIC SURGERY

## 2024-06-14 PROCEDURE — 3600000007 HC OR TIME - EACH INCREMENTAL 1 MINUTE - PROCEDURE LEVEL TWO: Performed by: ORTHOPAEDIC SURGERY

## 2024-06-14 PROCEDURE — 93010 ELECTROCARDIOGRAM REPORT: CPT | Performed by: INTERNAL MEDICINE

## 2024-06-14 PROCEDURE — 7100000002 HC RECOVERY ROOM TIME - EACH INCREMENTAL 1 MINUTE: Performed by: ORTHOPAEDIC SURGERY

## 2024-06-14 PROCEDURE — 87185 SC STD ENZYME DETCJ PER NZM: CPT | Mod: ELYLAB | Performed by: STUDENT IN AN ORGANIZED HEALTH CARE EDUCATION/TRAINING PROGRAM

## 2024-06-14 PROCEDURE — A4217 STERILE WATER/SALINE, 500 ML: HCPCS | Performed by: ORTHOPAEDIC SURGERY

## 2024-06-14 RX ORDER — BUMETANIDE 1 MG/1
2 TABLET ORAL 2 TIMES DAILY
Status: DISCONTINUED | OUTPATIENT
Start: 2024-06-14 | End: 2024-06-18 | Stop reason: HOSPADM

## 2024-06-14 RX ORDER — FENTANYL CITRATE 50 UG/ML
INJECTION, SOLUTION INTRAMUSCULAR; INTRAVENOUS AS NEEDED
Status: DISCONTINUED | OUTPATIENT
Start: 2024-06-14 | End: 2024-06-14

## 2024-06-14 RX ORDER — ENOXAPARIN SODIUM 100 MG/ML
40 INJECTION SUBCUTANEOUS DAILY
Status: DISCONTINUED | OUTPATIENT
Start: 2024-06-15 | End: 2024-06-18 | Stop reason: HOSPADM

## 2024-06-14 RX ORDER — MEPERIDINE HYDROCHLORIDE 25 MG/ML
12.5 INJECTION INTRAMUSCULAR; INTRAVENOUS; SUBCUTANEOUS EVERY 10 MIN PRN
Status: DISCONTINUED | OUTPATIENT
Start: 2024-06-14 | End: 2024-06-14 | Stop reason: HOSPADM

## 2024-06-14 RX ORDER — NALOXONE HYDROCHLORIDE 1 MG/ML
0.2 INJECTION INTRAMUSCULAR; INTRAVENOUS; SUBCUTANEOUS EVERY 5 MIN PRN
Status: DISCONTINUED | OUTPATIENT
Start: 2024-06-14 | End: 2024-06-14 | Stop reason: SDUPTHER

## 2024-06-14 RX ORDER — PROCHLORPERAZINE EDISYLATE 5 MG/ML
10 INJECTION INTRAMUSCULAR; INTRAVENOUS EVERY 6 HOURS PRN
Status: DISCONTINUED | OUTPATIENT
Start: 2024-06-14 | End: 2024-06-18 | Stop reason: HOSPADM

## 2024-06-14 RX ORDER — CELECOXIB 200 MG/1
200 CAPSULE ORAL ONCE
Status: COMPLETED | OUTPATIENT
Start: 2024-06-14 | End: 2024-06-14

## 2024-06-14 RX ORDER — LABETALOL HYDROCHLORIDE 5 MG/ML
5 INJECTION, SOLUTION INTRAVENOUS ONCE AS NEEDED
Status: DISCONTINUED | OUTPATIENT
Start: 2024-06-14 | End: 2024-06-14 | Stop reason: HOSPADM

## 2024-06-14 RX ORDER — METOPROLOL SUCCINATE 50 MG/1
100 TABLET, EXTENDED RELEASE ORAL DAILY
Status: DISCONTINUED | OUTPATIENT
Start: 2024-06-14 | End: 2024-06-18 | Stop reason: HOSPADM

## 2024-06-14 RX ORDER — MIDAZOLAM HYDROCHLORIDE 1 MG/ML
1 INJECTION, SOLUTION INTRAMUSCULAR; INTRAVENOUS ONCE
Status: DISCONTINUED | OUTPATIENT
Start: 2024-06-14 | End: 2024-06-18 | Stop reason: HOSPADM

## 2024-06-14 RX ORDER — BISACODYL 5 MG
10 TABLET, DELAYED RELEASE (ENTERIC COATED) ORAL DAILY PRN
Status: DISCONTINUED | OUTPATIENT
Start: 2024-06-14 | End: 2024-06-18 | Stop reason: HOSPADM

## 2024-06-14 RX ORDER — PROCHLORPERAZINE MALEATE 5 MG
10 TABLET ORAL EVERY 6 HOURS PRN
Status: DISCONTINUED | OUTPATIENT
Start: 2024-06-14 | End: 2024-06-18 | Stop reason: HOSPADM

## 2024-06-14 RX ORDER — SODIUM CHLORIDE, SODIUM LACTATE, POTASSIUM CHLORIDE, CALCIUM CHLORIDE 600; 310; 30; 20 MG/100ML; MG/100ML; MG/100ML; MG/100ML
100 INJECTION, SOLUTION INTRAVENOUS CONTINUOUS
Status: DISCONTINUED | OUTPATIENT
Start: 2024-06-14 | End: 2024-06-14 | Stop reason: HOSPADM

## 2024-06-14 RX ORDER — NALOXONE HYDROCHLORIDE 1 MG/ML
0.2 INJECTION INTRAMUSCULAR; INTRAVENOUS; SUBCUTANEOUS EVERY 5 MIN PRN
Status: DISCONTINUED | OUTPATIENT
Start: 2024-06-14 | End: 2024-06-18 | Stop reason: HOSPADM

## 2024-06-14 RX ORDER — PROPOFOL 10 MG/ML
INJECTION, EMULSION INTRAVENOUS AS NEEDED
Status: DISCONTINUED | OUTPATIENT
Start: 2024-06-14 | End: 2024-06-14

## 2024-06-14 RX ORDER — ALBUTEROL SULFATE 0.83 MG/ML
2.5 SOLUTION RESPIRATORY (INHALATION) ONCE
Status: DISCONTINUED | OUTPATIENT
Start: 2024-06-14 | End: 2024-06-14 | Stop reason: HOSPADM

## 2024-06-14 RX ORDER — SODIUM CHLORIDE, SODIUM LACTATE, POTASSIUM CHLORIDE, CALCIUM CHLORIDE 600; 310; 30; 20 MG/100ML; MG/100ML; MG/100ML; MG/100ML
100 INJECTION, SOLUTION INTRAVENOUS CONTINUOUS
Status: DISCONTINUED | OUTPATIENT
Start: 2024-06-14 | End: 2024-06-14

## 2024-06-14 RX ORDER — MIDAZOLAM HYDROCHLORIDE 1 MG/ML
1 INJECTION, SOLUTION INTRAMUSCULAR; INTRAVENOUS ONCE AS NEEDED
Status: COMPLETED | OUTPATIENT
Start: 2024-06-14 | End: 2024-06-14

## 2024-06-14 RX ORDER — DIPHENHYDRAMINE HYDROCHLORIDE 50 MG/ML
12.5 INJECTION, SOLUTION INTRAMUSCULAR; INTRAVENOUS EVERY 6 HOURS PRN
Status: DISCONTINUED | OUTPATIENT
Start: 2024-06-14 | End: 2024-06-18 | Stop reason: HOSPADM

## 2024-06-14 RX ORDER — GLYCOPYRROLATE 0.2 MG/ML
INJECTION INTRAMUSCULAR; INTRAVENOUS AS NEEDED
Status: DISCONTINUED | OUTPATIENT
Start: 2024-06-14 | End: 2024-06-14

## 2024-06-14 RX ORDER — SERTRALINE HYDROCHLORIDE 50 MG/1
100 TABLET, FILM COATED ORAL DAILY
Status: DISCONTINUED | OUTPATIENT
Start: 2024-06-14 | End: 2024-06-18 | Stop reason: HOSPADM

## 2024-06-14 RX ORDER — CYCLOBENZAPRINE HCL 10 MG
10 TABLET ORAL 3 TIMES DAILY PRN
Status: DISCONTINUED | OUTPATIENT
Start: 2024-06-14 | End: 2024-06-18 | Stop reason: HOSPADM

## 2024-06-14 RX ORDER — LIDOCAINE HYDROCHLORIDE 10 MG/ML
0.1 INJECTION, SOLUTION EPIDURAL; INFILTRATION; INTRACAUDAL; PERINEURAL ONCE
Status: DISCONTINUED | OUTPATIENT
Start: 2024-06-14 | End: 2024-06-14 | Stop reason: HOSPADM

## 2024-06-14 RX ORDER — OXYCODONE HYDROCHLORIDE 5 MG/1
5 TABLET ORAL EVERY 4 HOURS PRN
Status: DISCONTINUED | OUTPATIENT
Start: 2024-06-14 | End: 2024-06-18 | Stop reason: HOSPADM

## 2024-06-14 RX ORDER — DIPHENHYDRAMINE HYDROCHLORIDE 50 MG/ML
INJECTION INTRAMUSCULAR; INTRAVENOUS AS NEEDED
Status: DISCONTINUED | OUTPATIENT
Start: 2024-06-14 | End: 2024-06-14

## 2024-06-14 RX ORDER — SODIUM CHLORIDE 0.9 G/100ML
INJECTION, SOLUTION IRRIGATION AS NEEDED
Status: DISCONTINUED | OUTPATIENT
Start: 2024-06-14 | End: 2024-06-14 | Stop reason: HOSPADM

## 2024-06-14 RX ORDER — OXYCODONE HYDROCHLORIDE 5 MG/1
10 TABLET ORAL EVERY 6 HOURS PRN
Status: DISCONTINUED | OUTPATIENT
Start: 2024-06-14 | End: 2024-06-18 | Stop reason: HOSPADM

## 2024-06-14 RX ORDER — DOCUSATE SODIUM 100 MG/1
100 CAPSULE, LIQUID FILLED ORAL 2 TIMES DAILY
Status: DISCONTINUED | OUTPATIENT
Start: 2024-06-14 | End: 2024-06-18 | Stop reason: HOSPADM

## 2024-06-14 RX ORDER — LIDOCAINE HYDROCHLORIDE 20 MG/ML
INJECTION, SOLUTION INFILTRATION; PERINEURAL AS NEEDED
Status: DISCONTINUED | OUTPATIENT
Start: 2024-06-14 | End: 2024-06-14

## 2024-06-14 RX ORDER — ONDANSETRON 4 MG/1
4 TABLET, FILM COATED ORAL EVERY 8 HOURS PRN
Status: DISCONTINUED | OUTPATIENT
Start: 2024-06-14 | End: 2024-06-18 | Stop reason: HOSPADM

## 2024-06-14 RX ORDER — SODIUM CHLORIDE, SODIUM LACTATE, POTASSIUM CHLORIDE, CALCIUM CHLORIDE 600; 310; 30; 20 MG/100ML; MG/100ML; MG/100ML; MG/100ML
50 INJECTION, SOLUTION INTRAVENOUS CONTINUOUS
Status: ACTIVE | OUTPATIENT
Start: 2024-06-14 | End: 2024-06-15

## 2024-06-14 RX ORDER — ONDANSETRON HYDROCHLORIDE 2 MG/ML
INJECTION, SOLUTION INTRAVENOUS AS NEEDED
Status: DISCONTINUED | OUTPATIENT
Start: 2024-06-14 | End: 2024-06-14

## 2024-06-14 RX ORDER — PHENYLEPHRINE HCL IN 0.9% NACL 1 MG/10 ML
SYRINGE (ML) INTRAVENOUS AS NEEDED
Status: DISCONTINUED | OUTPATIENT
Start: 2024-06-14 | End: 2024-06-14

## 2024-06-14 RX ORDER — PROCHLORPERAZINE 25 MG/1
25 SUPPOSITORY RECTAL EVERY 12 HOURS PRN
Status: DISCONTINUED | OUTPATIENT
Start: 2024-06-14 | End: 2024-06-18 | Stop reason: HOSPADM

## 2024-06-14 RX ORDER — MORPHINE SULFATE 2 MG/ML
2 INJECTION, SOLUTION INTRAMUSCULAR; INTRAVENOUS EVERY 2 HOUR PRN
Status: DISCONTINUED | OUTPATIENT
Start: 2024-06-14 | End: 2024-06-18 | Stop reason: HOSPADM

## 2024-06-14 RX ORDER — DROPERIDOL 2.5 MG/ML
0.62 INJECTION, SOLUTION INTRAMUSCULAR; INTRAVENOUS ONCE AS NEEDED
Status: DISCONTINUED | OUTPATIENT
Start: 2024-06-14 | End: 2024-06-14 | Stop reason: HOSPADM

## 2024-06-14 RX ORDER — GABAPENTIN 100 MG/1
100 CAPSULE ORAL 3 TIMES DAILY
Status: DISCONTINUED | OUTPATIENT
Start: 2024-06-14 | End: 2024-06-18 | Stop reason: HOSPADM

## 2024-06-14 RX ORDER — ONDANSETRON HYDROCHLORIDE 2 MG/ML
4 INJECTION, SOLUTION INTRAVENOUS EVERY 8 HOURS PRN
Status: DISCONTINUED | OUTPATIENT
Start: 2024-06-14 | End: 2024-06-18 | Stop reason: HOSPADM

## 2024-06-14 RX ADMIN — HYDROMORPHONE HYDROCHLORIDE 0.5 MG: 1 INJECTION, SOLUTION INTRAMUSCULAR; INTRAVENOUS; SUBCUTANEOUS at 14:21

## 2024-06-14 RX ADMIN — MIDAZOLAM 1 MG: 1 INJECTION INTRAMUSCULAR; INTRAVENOUS at 14:27

## 2024-06-14 RX ADMIN — HYDROMORPHONE HYDROCHLORIDE 0.5 MG: 1 INJECTION, SOLUTION INTRAMUSCULAR; INTRAVENOUS; SUBCUTANEOUS at 14:05

## 2024-06-14 RX ADMIN — Medication 3 L/MIN: at 16:15

## 2024-06-14 RX ADMIN — SODIUM CHLORIDE, POTASSIUM CHLORIDE, SODIUM LACTATE AND CALCIUM CHLORIDE 50 ML/HR: 600; 310; 30; 20 INJECTION, SOLUTION INTRAVENOUS at 16:57

## 2024-06-14 RX ADMIN — GABAPENTIN 100 MG: 100 CAPSULE ORAL at 22:51

## 2024-06-14 RX ADMIN — GABAPENTIN 100 MG: 100 CAPSULE ORAL at 18:00

## 2024-06-14 RX ADMIN — SODIUM CHLORIDE, POTASSIUM CHLORIDE, SODIUM LACTATE AND CALCIUM CHLORIDE 100 ML/HR: 600; 310; 30; 20 INJECTION, SOLUTION INTRAVENOUS at 11:46

## 2024-06-14 RX ADMIN — CELECOXIB 200 MG: 200 CAPSULE ORAL at 11:50

## 2024-06-14 RX ADMIN — CYCLOBENZAPRINE 10 MG: 10 TABLET, FILM COATED ORAL at 20:44

## 2024-06-14 RX ADMIN — VANCOMYCIN HYDROCHLORIDE 1500 MG: 1.5 INJECTION, POWDER, LYOPHILIZED, FOR SOLUTION INTRAVENOUS at 13:01

## 2024-06-14 RX ADMIN — SERTRALINE HYDROCHLORIDE 100 MG: 50 TABLET, FILM COATED ORAL at 17:59

## 2024-06-14 RX ADMIN — OXYCODONE HYDROCHLORIDE 10 MG: 5 TABLET ORAL at 16:54

## 2024-06-14 RX ADMIN — OXYCODONE HYDROCHLORIDE 10 MG: 5 TABLET ORAL at 22:52

## 2024-06-14 RX ADMIN — DOCUSATE SODIUM 100 MG: 100 CAPSULE, LIQUID FILLED ORAL at 20:44

## 2024-06-14 SDOH — HEALTH STABILITY: MENTAL HEALTH: CURRENT SMOKER: 1

## 2024-06-14 ASSESSMENT — PAIN SCALES - GENERAL
PAINLEVEL_OUTOF10: 5 - MODERATE PAIN
PAINLEVEL_OUTOF10: 6
PAINLEVEL_OUTOF10: 10 - WORST POSSIBLE PAIN
PAINLEVEL_OUTOF10: 4
PAINLEVEL_OUTOF10: 7
PAINLEVEL_OUTOF10: 4
PAINLEVEL_OUTOF10: 10 - WORST POSSIBLE PAIN
PAINLEVEL_OUTOF10: 0 - NO PAIN
PAINLEVEL_OUTOF10: 6

## 2024-06-14 ASSESSMENT — PAIN - FUNCTIONAL ASSESSMENT
PAIN_FUNCTIONAL_ASSESSMENT: 0-10

## 2024-06-14 ASSESSMENT — COGNITIVE AND FUNCTIONAL STATUS - GENERAL
MOVING FROM LYING ON BACK TO SITTING ON SIDE OF FLAT BED WITH BEDRAILS: A LITTLE
DAILY ACTIVITIY SCORE: 16
DRESSING REGULAR UPPER BODY CLOTHING: A LITTLE
TOILETING: A LOT
DAILY ACTIVITIY SCORE: 16
MOVING TO AND FROM BED TO CHAIR: TOTAL
DRESSING REGULAR LOWER BODY CLOTHING: A LOT
MOVING TO AND FROM BED TO CHAIR: TOTAL
CLIMB 3 TO 5 STEPS WITH RAILING: TOTAL
HELP NEEDED FOR BATHING: A LOT
MOVING FROM LYING ON BACK TO SITTING ON SIDE OF FLAT BED WITH BEDRAILS: A LOT
STANDING UP FROM CHAIR USING ARMS: TOTAL
TURNING FROM BACK TO SIDE WHILE IN FLAT BAD: A LOT
HELP NEEDED FOR BATHING: A LOT
PERSONAL GROOMING: A LITTLE
WALKING IN HOSPITAL ROOM: TOTAL
DRESSING REGULAR LOWER BODY CLOTHING: A LOT
STANDING UP FROM CHAIR USING ARMS: TOTAL
PERSONAL GROOMING: A LITTLE
MOBILITY SCORE: 9
TURNING FROM BACK TO SIDE WHILE IN FLAT BAD: A LOT
TOILETING: A LOT
DRESSING REGULAR UPPER BODY CLOTHING: A LITTLE
MOBILITY SCORE: 8
CLIMB 3 TO 5 STEPS WITH RAILING: TOTAL
WALKING IN HOSPITAL ROOM: TOTAL

## 2024-06-14 ASSESSMENT — PAIN DESCRIPTION - ORIENTATION: ORIENTATION: LEFT

## 2024-06-14 ASSESSMENT — PAIN DESCRIPTION - LOCATION: LOCATION: KNEE

## 2024-06-14 ASSESSMENT — PAIN DESCRIPTION - DESCRIPTORS: DESCRIPTORS: BURNING

## 2024-06-14 NOTE — OP NOTE
Operative Note     Date: 2024  OR Location: [facility]    Name: Lisa Guerrero, : 1965, Age: 59 y.o., MRN: [id number], Sex: female    Diagnosis  Pre-op Diagnosis     * Disruption of external operation (surgical) wound, not elsewhere classified, initial encounter [T81.31XA] Post-op Diagnosis     * Disruption of external operation (surgical) wound, not elsewhere classified, initial encounter [T81.31XA]     Procedures  Irrigation and Debridement Knee with Wound Vac Placement  87228 - MN I&D DEEP ABSC BURSA/HEMATOMA THIGH/KNEE REGION      Surgeons      * Favian Mcghee - Primary    Resident/Fellow/Other Assistant:  Surgeons and Role:     * Sandee Sesay PA-C - Assisting    Procedure Summary  Anesthesia: General  ASA: III  Anesthesia Staff: Anesthesiologist: Dwayne Staton MD  CRNA: TONY Skinner-CRNA  Estimated Blood Loss: 20mL  Intra-op Medications:   Administrations occurring from 1300 to 1400 on 24:   Medication Name Total Dose   vancomycin (Vancocin) 1,500 mg in dextrose 5%  mL 327.75 mL              Anesthesia Record               Intraprocedure I/O Totals       None           Specimen:   ID Type Source Tests Collected by Time   A : LEFT KNEE CULTURE #1 Swab KNEE ARTHROPLASTY LEFT TISSUE/WOUND CULTURE/SMEAR Favian Mcghee MD 2024 1348   B : LEFT KNEE CULTURE #2 Swab KNEE ARTHROPLASTY LEFT TISSUE/WOUND CULTURE/SMEAR Favian Mcghee MD 2024 1352        Staff:   Circulator: Liv  Scrub Person: Sherley           Implants:     Findings: see procedure details    Indications:     The patient was seen in the preoperative area. The risks, benefits, complications, treatment options, non-operative alternatives, expected recovery and outcomes were discussed with the patient. The possibilities of reaction to medication, pulmonary aspiration, injury to surrounding structures, bleeding, recurrent infection, the need for additional procedures, failure to diagnose a condition, and creating a  complication requiring transfusion or operation were discussed with the patient. The patient concurred with the proposed plan, giving informed consent.  The site of surgery was properly noted/marked if necessary per policy. The patient has been actively warmed in preoperative area. Preoperative antibiotics have been ordered and given within 1 hours of incision. Venous thrombosis prophylaxis have been ordered.    Procedure Details:   Patient had a comminuted patella fracture with significant blistering and swelling surgery was delayed and vascular workup was done prior but overall tissue quality was very poor.  During follow-up she was noted to have wound dehiscence with some evidence of infection and necrosis anterior to the wound.  We discussed debridement, I&D and wound VAC placement with her.  She had a small wound distally in the leg that was noted she states she bumped it recently it was a small laceration with some serous weepage but no surrounding erythema.  This was dressed at the end of the case.    Patient was transported the operating room after prepped draped antibiotic and timeout the leg was examined and the tourniquet was inflated.  Sharp incision was made to debride the skin and subcutaneous tissue as well as some of the fascia and retinacular tissue until healthy and stable margins were obtained.  There was some purulent tissue.  There was no communication with the joint and the extensor mechanism was intact.  Cultures were taken x 2.    Using sharp dissection, rongeur, curette the nonviable tissue was removed until we had healthy bleeding and stable tissue.  The hardware was exposed.  We noted normal appearance of the fracture with no evidence of nonunion.  We then pulsatile lavage the wound with 12 L of normal saline followed by Irrisept irrigation.  Once this was complete the wound VAC was placed and placed on 125 / low intermittent suction.  The leg was dressed in sterile bandages and wrapped to  help with swelling.    The physician assistant was present for the entire case.  Given the nature of the procedure and disease process, a skilled surgical assistant was necessary for the case.  The assistant was necessary for retraction and helped directly facilitate completion of the surgery.  A certified scrub tech was at the back table managing instruments and supplies for the surgical procedure.    Plan is to return for hardware removal in approximately 1 month as the fracture has had more time to heal.    Complications:  None; patient tolerated the procedure well.    Disposition: PACU - hemodynamically stable.  Condition: stable         Favian Mcghee  Phone Number: [phone]

## 2024-06-14 NOTE — ANESTHESIA PREPROCEDURE EVALUATION
Lisa Guerrero is a 59 y.o. female here for:    Irrigation and Debridement Knee with Wound Vac Placement  With Favian Mcghee MD  Pre-Op Diagnosis Codes:     * Disruption of external operation (surgical) wound, not elsewhere classified, initial encounter [T81.31XA]    Relevant Problems   Cardiac  Echo 2023:  CONCLUSIONS:  1. Left ventricular systolic function is mildly decreased with a 40-45% estimated ejection fraction.  2. Moderately enlarged right ventricle.  3. There is moderately reduced right ventricular systolic function.  4. The left atrium is moderately dilated.  5. Moderate mitral valve regurgitation.  6. Mildly elevated RVSP.  7. Moderate tricuspid regurgitation.  8. Left ventricular cavity size is moderately dilated.  9. There is global hypokinesis of the left ventricle with minor regional variations.     (+) Abnormal EKG   (+) Hypertension   (+) Mitral regurgitation      Pulmonary   (+) Chronic obstructive pulmonary disease (Multi)   (+) Dyspnea on minimal exertion   (+) HERNANDO (obstructive sleep apnea)   (+) Pneumonia      Neuro   (+) Chronic left-sided lumbar radiculopathy   (+) Depression   (+) Panic attacks      GI   (+) Esophageal dysphagia   (+) GERD (gastroesophageal reflux disease)      Liver   (+) Fatty liver      Endocrine   (+) Class 3 severe obesity in adult (Multi)      Hematology   (+) Factor V Leiden (Multi)   (+) Factor V deficiency (Multi)   (+) Iron deficiency anemia      Musculoskeletal   (+) Lumbar disc disease   (+) Osteoarthritis of left hip      GYN   (+) PCOD (polycystic ovarian disease)       Lab Results   Component Value Date    HGB 10.4 (L) 05/04/2024    HCT 32.5 (L) 05/04/2024    WBC 15.8 (H) 05/04/2024     05/04/2024     05/04/2024    K 4.4 05/04/2024     05/04/2024    CREATININE 0.90 05/04/2024    BUN 25 (H) 05/04/2024       Social History     Tobacco Use   Smoking Status Every Day    Current packs/day: 0.25    Types: Cigarettes   Smokeless Tobacco Never        Allergies   Allergen Reactions    Ceftriaxone Hives    Codeine Hives    Penicillins Hives    Percocet [Oxycodone-Acetaminophen] GI Upset and Nausea/vomiting    Sacubitril-Valsartan Headache and Dizziness     Nightmares, dry mouth    Sulfamethoxazole-Trimethoprim Hives    Hydrogen Peroxide Rash     Burning/swelling    Latex Rash     Blisters        Current Outpatient Medications   Medication Instructions    albuterol 90 mcg/actuation inhaler 1-2 puffs, inhalation, Every 4 hours PRN    ammonium lactate (Lac-Hydrin) 12 % lotion Topical, As needed    bumetanide (Bumex) 2 mg tablet oral, 2 times daily    cyclobenzaprine (FLEXERIL) 10 mg, oral, 3 times daily PRN    Entresto 24-26 mg tablet 1 tablet, oral, 2 times daily    fluticasone (Flonase) 50 mcg/actuation nasal spray 2 sprays, nasal, Daily    gabapentin (NEURONTIN) 100 mg, oral, 3 times daily    ketoconazole (NIZOral) 2 % cream Topical, Daily    magnesium oxide (MAG-OX) 400 mg, oral, Daily    metoprolol succinate XL (TOPROL-XL) 100 mg, oral, Daily, Do not crush or chew.    potassium chloride CR 10 mEq ER tablet 10 mEq, oral, Daily, Do not crush, chew, or split.    sertraline (ZOLOFT) 100 mg, oral, Daily    TURMERIC ORAL 1 capsule, oral, Daily    Xarelto 20 mg, oral, Daily       Past Surgical History:   Procedure Laterality Date    CARDIAC CATHETERIZATION      CHOLECYSTECTOMY      HIP ARTHROPLASTY Left     LEG SURGERY Left     TUBAL LIGATION         Family History   Problem Relation Name Age of Onset    Aneurysm Mother      No Known Problems Father         NPO Details:  No data recorded    Physical Exam    Airway  Mallampati: IV  TM distance: >3 FB  Neck ROM: full     Cardiovascular - normal exam     Dental - normal exam     Pulmonary - normal exam     Abdominal            Anesthesia Plan    History of general anesthesia?: yes  History of complications of general anesthesia?: no    ASA 3     general     The patient is a current smoker.  Patient was previously  instructed to abstain from smoking on day of procedure.    intravenous induction   Postoperative administration of opioids is intended.  Trial extubation is planned.  Anesthetic plan and risks discussed with patient.

## 2024-06-14 NOTE — ANESTHESIA POSTPROCEDURE EVALUATION
Patient: Lisa Guerrero    Procedure Summary       Date: 06/14/24 Room / Location: [facility] / [facility]    Anesthesia Start: 1308 Anesthesia Stop: 1402    Procedure: Irrigation and Debridement Knee with Wound Vac Placement (Left: Knee) Diagnosis:       Disruption of external operation (surgical) wound, not elsewhere classified, initial encounter      (Disruption of external operation (surgical) wound, not elsewhere classified, initial encounter [T81.31XA])    Surgeons: Favian Mcghee MD Responsible Provider: Dwayne Staton MD    Anesthesia Type: general ASA Status: 3            Anesthesia Type: general    Anesthesia Post Evaluation    Patient location during evaluation: PACU  Patient participation: complete - patient participated  Level of consciousness: awake and alert  Pain management: adequate  Airway patency: patent  Cardiovascular status: acceptable  Respiratory status: acceptable  Hydration status: acceptable  Postoperative Nausea and Vomiting: none      No notable events documented.

## 2024-06-14 NOTE — PERIOPERATIVE NURSING NOTE
Pt. Very restless and emotional. Holding her breath. Dr. Staton aware of medication given per CRNA at bedside and this RN. Orders in computer and will be followwed.

## 2024-06-14 NOTE — CARE PLAN
Problem: Pain - Adult  Goal: Verbalizes/displays adequate comfort level or baseline comfort level  Outcome: Progressing     Problem: Safety - Adult  Goal: Free from fall injury  Outcome: Progressing     Problem: Discharge Planning  Goal: Discharge to home or other facility with appropriate resources  Outcome: Progressing     Problem: Chronic Conditions and Co-morbidities  Goal: Patient's chronic conditions and co-morbidity symptoms are monitored and maintained or improved  Outcome: Progressing

## 2024-06-14 NOTE — PERIOPERATIVE NURSING NOTE
Pt. Has been resting comfortable in bed with eyes closed since versed given Arouses to verbal stimuli.

## 2024-06-14 NOTE — ANESTHESIA PROCEDURE NOTES
Airway  Date/Time: 6/14/2024 1:15 PM  Urgency: elective    Airway not difficult    Staffing  Performed: CRNA   Authorized by: Dwayne Staton MD    Performed by: TONY Skinner-CANDIDA  Patient location during procedure: OR    Indications and Patient Condition  Indications for airway management: anesthesia  Spontaneous ventilation: present  Sedation level: deep  Preoxygenated: yes  Patient position: sniffing  MILS maintained throughout  Mask difficulty assessment: 0 - not attempted  No planned trial extubation    Final Airway Details  Final airway type: supraglottic airway      Successful airway: Size 4     Number of attempts at approach: 1

## 2024-06-15 VITALS
SYSTOLIC BLOOD PRESSURE: 106 MMHG | HEART RATE: 52 BPM | DIASTOLIC BLOOD PRESSURE: 58 MMHG | OXYGEN SATURATION: 96 % | RESPIRATION RATE: 16 BRPM | TEMPERATURE: 97.5 F

## 2024-06-15 LAB
ANION GAP SERPL CALC-SCNC: 9 MMOL/L (ref 10–20)
BUN SERPL-MCNC: 17 MG/DL (ref 6–23)
CALCIUM SERPL-MCNC: 8.8 MG/DL (ref 8.6–10.3)
CHLORIDE SERPL-SCNC: 108 MMOL/L (ref 98–107)
CO2 SERPL-SCNC: 25 MMOL/L (ref 21–32)
CREAT SERPL-MCNC: 0.9 MG/DL (ref 0.5–1.05)
EGFRCR SERPLBLD CKD-EPI 2021: 73 ML/MIN/1.73M*2
ERYTHROCYTE [DISTWIDTH] IN BLOOD BY AUTOMATED COUNT: 16.6 % (ref 11.5–14.5)
GLUCOSE SERPL-MCNC: 115 MG/DL (ref 74–99)
HCT VFR BLD AUTO: 38.5 % (ref 36–46)
HGB BLD-MCNC: 12 G/DL (ref 12–16)
HOLD SPECIMEN: NORMAL
MCH RBC QN AUTO: 28.4 PG (ref 26–34)
MCHC RBC AUTO-ENTMCNC: 31.2 G/DL (ref 32–36)
MCV RBC AUTO: 91 FL (ref 80–100)
NRBC BLD-RTO: 0 /100 WBCS (ref 0–0)
PLATELET # BLD AUTO: 171 X10*3/UL (ref 150–450)
POTASSIUM SERPL-SCNC: 4.5 MMOL/L (ref 3.5–5.3)
RBC # BLD AUTO: 4.23 X10*6/UL (ref 4–5.2)
SODIUM SERPL-SCNC: 137 MMOL/L (ref 136–145)
WBC # BLD AUTO: 8.7 X10*3/UL (ref 4.4–11.3)

## 2024-06-15 PROCEDURE — 97165 OT EVAL LOW COMPLEX 30 MIN: CPT | Mod: GO

## 2024-06-15 PROCEDURE — 2500000001 HC RX 250 WO HCPCS SELF ADMINISTERED DRUGS (ALT 637 FOR MEDICARE OP)

## 2024-06-15 PROCEDURE — 2500000002 HC RX 250 W HCPCS SELF ADMINISTERED DRUGS (ALT 637 FOR MEDICARE OP, ALT 636 FOR OP/ED)

## 2024-06-15 PROCEDURE — 7100000011 HC EXTENDED STAY RECOVERY HOURLY - NURSING UNIT

## 2024-06-15 PROCEDURE — 99221 1ST HOSP IP/OBS SF/LOW 40: CPT | Performed by: PLASTIC SURGERY

## 2024-06-15 PROCEDURE — 96372 THER/PROPH/DIAG INJ SC/IM: CPT | Performed by: ORTHOPAEDIC SURGERY

## 2024-06-15 PROCEDURE — 2500000004 HC RX 250 GENERAL PHARMACY W/ HCPCS (ALT 636 FOR OP/ED): Performed by: ORTHOPAEDIC SURGERY

## 2024-06-15 PROCEDURE — 85027 COMPLETE CBC AUTOMATED: CPT | Performed by: ORTHOPAEDIC SURGERY

## 2024-06-15 PROCEDURE — 80048 BASIC METABOLIC PNL TOTAL CA: CPT | Performed by: ORTHOPAEDIC SURGERY

## 2024-06-15 PROCEDURE — 36415 COLL VENOUS BLD VENIPUNCTURE: CPT | Performed by: ORTHOPAEDIC SURGERY

## 2024-06-15 PROCEDURE — 97161 PT EVAL LOW COMPLEX 20 MIN: CPT | Mod: GP

## 2024-06-15 PROCEDURE — 2500000001 HC RX 250 WO HCPCS SELF ADMINISTERED DRUGS (ALT 637 FOR MEDICARE OP): Performed by: ORTHOPAEDIC SURGERY

## 2024-06-15 RX ORDER — IBUPROFEN 200 MG
1 TABLET ORAL DAILY
Status: DISCONTINUED | OUTPATIENT
Start: 2024-06-15 | End: 2024-06-18 | Stop reason: HOSPADM

## 2024-06-15 RX ADMIN — GABAPENTIN 100 MG: 100 CAPSULE ORAL at 21:22

## 2024-06-15 RX ADMIN — GABAPENTIN 100 MG: 100 CAPSULE ORAL at 08:41

## 2024-06-15 RX ADMIN — DOCUSATE SODIUM 100 MG: 100 CAPSULE, LIQUID FILLED ORAL at 08:41

## 2024-06-15 RX ADMIN — GABAPENTIN 100 MG: 100 CAPSULE ORAL at 14:18

## 2024-06-15 RX ADMIN — OXYCODONE HYDROCHLORIDE 10 MG: 5 TABLET ORAL at 13:09

## 2024-06-15 RX ADMIN — SERTRALINE HYDROCHLORIDE 100 MG: 50 TABLET, FILM COATED ORAL at 08:41

## 2024-06-15 RX ADMIN — ENOXAPARIN SODIUM 40 MG: 40 INJECTION SUBCUTANEOUS at 08:41

## 2024-06-15 RX ADMIN — DOCUSATE SODIUM 100 MG: 100 CAPSULE, LIQUID FILLED ORAL at 21:22

## 2024-06-15 RX ADMIN — VANCOMYCIN HYDROCHLORIDE 1.5 G: 1.5 INJECTION, POWDER, LYOPHILIZED, FOR SOLUTION INTRAVENOUS at 01:22

## 2024-06-15 RX ADMIN — OXYCODONE HYDROCHLORIDE 10 MG: 5 TABLET ORAL at 05:44

## 2024-06-15 ASSESSMENT — COGNITIVE AND FUNCTIONAL STATUS - GENERAL
TURNING FROM BACK TO SIDE WHILE IN FLAT BAD: A LITTLE
DRESSING REGULAR UPPER BODY CLOTHING: A LITTLE
MOVING TO AND FROM BED TO CHAIR: A LITTLE
CLIMB 3 TO 5 STEPS WITH RAILING: A LOT
DRESSING REGULAR UPPER BODY CLOTHING: A LITTLE
TOILETING: A LITTLE
HELP NEEDED FOR BATHING: A LOT
DRESSING REGULAR LOWER BODY CLOTHING: A LITTLE
CLIMB 3 TO 5 STEPS WITH RAILING: A LITTLE
MOBILITY SCORE: 19
PERSONAL GROOMING: A LITTLE
MOBILITY SCORE: 19
PERSONAL GROOMING: A LITTLE
WALKING IN HOSPITAL ROOM: A LITTLE
DAILY ACTIVITIY SCORE: 19
HELP NEEDED FOR BATHING: A LITTLE
TOILETING: A LITTLE
STANDING UP FROM CHAIR USING ARMS: A LITTLE
DAILY ACTIVITIY SCORE: 17
MOVING TO AND FROM BED TO CHAIR: A LITTLE
DRESSING REGULAR LOWER BODY CLOTHING: A LOT
STANDING UP FROM CHAIR USING ARMS: A LITTLE
WALKING IN HOSPITAL ROOM: A LITTLE

## 2024-06-15 ASSESSMENT — PAIN - FUNCTIONAL ASSESSMENT
PAIN_FUNCTIONAL_ASSESSMENT: 0-10

## 2024-06-15 ASSESSMENT — PAIN SCALES - GENERAL
PAINLEVEL_OUTOF10: 0 - NO PAIN
PAINLEVEL_OUTOF10: 4
PAINLEVEL_OUTOF10: 0 - NO PAIN
PAINLEVEL_OUTOF10: 7
PAINLEVEL_OUTOF10: 0 - NO PAIN

## 2024-06-15 ASSESSMENT — ACTIVITIES OF DAILY LIVING (ADL): BATHING_ASSISTANCE: MODERATE

## 2024-06-15 NOTE — CONSULTS
Consults    Reason For Consult  Medical management of heart failure and hypertension    History Of Present Illness  Lisa Guerrero is a 59 y.o. female presenting with left knee pain and wound dehiscence.  Patient was taken to the OR by orthopedics for an I&D of bursa/hematoma thigh and knee region on the left side. Patient underwent surgery with no immediate post op complications, reports minimal pain to site described as dull in nature, mild in severity, responding well to pain meds. No other complaints. Hospitalist services were consulted for management of the patient's medical conditions post/op.  Patient examined and seen, resting comfortably. Denies chest pain, shortness of breath, abdominal pain, dizziness, fever or chills. Currently patient vital signs are stable. Plan of care was discussed with patient, verbalized understanding through teach back method. Hospitalist team will continue to follow until discharge.    Review of systems: 10 system were reviewed and were negative except what was mentioned in history of present illness     Past Medical History  She has a past medical history of Acute on chronic diastolic heart failure with preserved ejection fraction (Multi) (04/26/2023), Anxiety, Arthritis, CHF (congestive heart failure) (Multi), Depression, Factor V Leiden (Multi), Fractures, Gout, Hepatomegaly, not elsewhere classified, History of blood clots, PONV (postoperative nausea and vomiting), Psoriasis, Skin disorder, Sleep apnea, Snores, Unsteady gait, Uses crutches, and Wears glasses.    Surgical History  She has a past surgical history that includes Tubal ligation; Cholecystectomy; Cardiac catheterization; Hip Arthroplasty (Left); and Leg Surgery (Left).     Social History  She reports that she has been smoking cigarettes. She has never used smokeless tobacco. She reports current alcohol use. She reports that she does not use drugs.    Family History  Family History   Problem Relation Name Age of  "Onset    Aneurysm Mother      No Known Problems Father          Allergies  Ceftriaxone, Codeine, Penicillins, Percocet [oxycodone-acetaminophen], Sacubitril-valsartan, Sulfamethoxazole-trimethoprim, Hydrogen peroxide, and Latex    Physical Exam  Constitutional: Well developed, awake/alert/oriented x3, cooperative  Eyes: PERRL,  clear sclera  ENMT: mucous membranes moist, no apparent injury, no lesions seen  Head/Neck: Neck supple, no apparent injury,  Respiratory/Thorax: Patent airways,  normal breath sounds with good chest expansion, thorax symmetric  Cardiovascular: Regular, rate and rhythm, no murmurs, 2+ equal pulses of the extremities, normal S 1and S 2  Gastrointestinal: Nondistended, soft, non-tender,   Musculoskeletal: mild decrease range of motion to LEFT KNEE s/p sx intervention, good capillary refills bilaterally, +2 pulses, good sensation   Extremities: normal extremities,  incision covered with Aquacel, CDI   Skin: warm, dry, intact  Neurological: alert/oriented x 3, speech clear  Psychiatric: appropriate mood and behavior    Last Recorded Vitals  Blood pressure 116/61, pulse 67, temperature 36.5 °C (97.7 °F), resp. rate 16, height 1.626 m (5' 4\"), weight 112 kg (246 lb 14.6 oz), SpO2 95%.    Relevant Results  CBC:   Results from last 7 days   Lab Units 06/15/24  0346 06/14/24  1144   WBC AUTO x10*3/uL 8.7 7.2   RBC AUTO x10*6/uL 4.23 5.00   HEMOGLOBIN g/dL 12.0 14.3   HEMATOCRIT % 38.5 44.9   MCV fL 91 90   MCH pg 28.4 28.6   MCHC g/dL 31.2* 31.8*   RDW % 16.6* 16.4*   PLATELETS AUTO x10*3/uL 171 191     CMP:    Results from last 7 days   Lab Units 06/15/24  0346 06/14/24  1144   SODIUM mmol/L 137 143   POTASSIUM mmol/L 4.5 4.4   CHLORIDE mmol/L 108* 113*   CO2 mmol/L 25 24   BUN mg/dL 17 16   CREATININE mg/dL 0.90 0.85   GLUCOSE mg/dL 115* 91   PROTEIN TOTAL g/dL  --  6.9   CALCIUM mg/dL 8.8 9.4   BILIRUBIN TOTAL mg/dL  --  0.3   ALK PHOS U/L  --  69   AST U/L  --  5*   ALT U/L  --  7     BMP:  "   Results from last 7 days   Lab Units 06/15/24  0346 06/14/24  1144   SODIUM mmol/L 137 143   POTASSIUM mmol/L 4.5 4.4   CHLORIDE mmol/L 108* 113*   CO2 mmol/L 25 24   BUN mg/dL 17 16   CREATININE mg/dL 0.90 0.85   CALCIUM mg/dL 8.8 9.4   GLUCOSE mg/dL 115* 91     Magnesium:    Troponin:      BNP:     Lipid Panel:  Results from last 7 days   Lab Units 06/14/24  1144   INR  1.1   PROTIME seconds 12.0        Assessment/Plan    Left knee pain with wound dehiscence  Acute on chronic diastolic CHF  Anxiety  Arthritis  Depression  Factor V Leiden  Gout  Hepatomegaly  Tobacco abuse    -Ortho primary team  -Admit to inpatient  -Pain and DVT prophylaxis per Ortho team  -BMP and CBC in a.m.  -Resume home meds  -Tobacco abuse, refused nicotine patch  -Bumex and metoprolol held for low blood pressure  -PT/OT evaluation  -TCC for discharge planning    Thank you for consult  We will continue to follow  Hemodynamically stable      TONY Batista-CNP    Plan of care was discussed extensively with patient.  Patient verbalized understanding through teach back method.  All question and concerns addressed upon examination.    Of note, this documentation is completed using the Dragon Dictation system (voice recognition software). There may be spelling and/or grammatical errors that were not corrected prior to final submission.

## 2024-06-15 NOTE — PROGRESS NOTES
Progress Note   TKA    Subjective:     Post-Operative Day: 1 Status Post left knee irrigation and debridement with wound vac placement No Complaints    Objective:     Visit Vitals  /56 (BP Location: Right arm, Patient Position: Lying)   Pulse 53   Temp 36.1 °C (97 °F) (Temporal)   Resp 16       General: alert and oriented, in no acute distress, appears stated age, and cooperative   Wound: no erythema, no edema, and wound vac in place and working appropriately.    Motion: Painful range of Motion   DVT Exam: No evidence of DVT seen on physical exam.  Negative Lorri's sign.  No significant calf/ankle edema.       Knee swollen but thigh soft to palpation. Moving foot and ankle. Good distal pulses.      Data Review  Recent Results (from the past 24 hour(s))   EKG 12 lead    Collection Time: 06/14/24 11:34 AM   Result Value Ref Range    Ventricular Rate 69 BPM    Atrial Rate 69 BPM    KS Interval 146 ms    QRS Duration 102 ms    QT Interval 420 ms    QTC Calculation(Bazett) 450 ms    P Axis 42 degrees    R Axis -28 degrees    T Axis -10 degrees    QRS Count 11 beats    Q Onset 204 ms    P Onset 131 ms    P Offset 180 ms    T Offset 414 ms    QTC Fredericia 440 ms   Comprehensive Metabolic Panel    Collection Time: 06/14/24 11:44 AM   Result Value Ref Range    Glucose 91 74 - 99 mg/dL    Sodium 143 136 - 145 mmol/L    Potassium 4.4 3.5 - 5.3 mmol/L    Chloride 113 (H) 98 - 107 mmol/L    Bicarbonate 24 21 - 32 mmol/L    Anion Gap 10 10 - 20 mmol/L    Urea Nitrogen 16 6 - 23 mg/dL    Creatinine 0.85 0.50 - 1.05 mg/dL    eGFR 78 >60 mL/min/1.73m*2    Calcium 9.4 8.6 - 10.3 mg/dL    Albumin 3.9 3.4 - 5.0 g/dL    Alkaline Phosphatase 69 33 - 136 U/L    Total Protein 6.9 6.4 - 8.2 g/dL    AST 5 (L) 9 - 39 U/L    Bilirubin, Total 0.3 0.0 - 1.2 mg/dL    ALT 7 7 - 45 U/L   CBC and Auto Differential    Collection Time: 06/14/24 11:44 AM   Result Value Ref Range    WBC 7.2 4.4 - 11.3 x10*3/uL    nRBC 0.0 0.0 - 0.0 /100 WBCs     RBC 5.00 4.00 - 5.20 x10*6/uL    Hemoglobin 14.3 12.0 - 16.0 g/dL    Hematocrit 44.9 36.0 - 46.0 %    MCV 90 80 - 100 fL    MCH 28.6 26.0 - 34.0 pg    MCHC 31.8 (L) 32.0 - 36.0 g/dL    RDW 16.4 (H) 11.5 - 14.5 %    Platelets 191 150 - 450 x10*3/uL    Neutrophils % 69.9 40.0 - 80.0 %    Immature Granulocytes %, Automated 0.1 0.0 - 0.9 %    Lymphocytes % 19.2 13.0 - 44.0 %    Monocytes % 5.3 2.0 - 10.0 %    Eosinophils % 4.9 0.0 - 6.0 %    Basophils % 0.6 0.0 - 2.0 %    Neutrophils Absolute 5.02 1.20 - 7.70 x10*3/uL    Immature Granulocytes Absolute, Automated 0.01 0.00 - 0.70 x10*3/uL    Lymphocytes Absolute 1.38 1.20 - 4.80 x10*3/uL    Monocytes Absolute 0.38 0.10 - 1.00 x10*3/uL    Eosinophils Absolute 0.35 0.00 - 0.70 x10*3/uL    Basophils Absolute 0.04 0.00 - 0.10 x10*3/uL   Protime-INR    Collection Time: 06/14/24 11:44 AM   Result Value Ref Range    Protime 12.0 9.8 - 12.8 seconds    INR 1.1 0.9 - 1.1   Tissue/Wound Culture/Smear    Collection Time: 06/14/24  1:48 PM    Specimen: KNEE ARTHROPLASTY LEFT; Swab   Result Value Ref Range    Gram Stain No polymorphonuclear leukocytes seen     Gram Stain No organisms seen    Tissue/Wound Culture/Smear    Collection Time: 06/14/24  1:52 PM    Specimen: KNEE ARTHROPLASTY LEFT; Swab   Result Value Ref Range    Gram Stain No polymorphonuclear leukocytes seen     Gram Stain No organisms seen    CBC    Collection Time: 06/15/24  3:46 AM   Result Value Ref Range    WBC 8.7 4.4 - 11.3 x10*3/uL    nRBC 0.0 0.0 - 0.0 /100 WBCs    RBC 4.23 4.00 - 5.20 x10*6/uL    Hemoglobin 12.0 12.0 - 16.0 g/dL    Hematocrit 38.5 36.0 - 46.0 %    MCV 91 80 - 100 fL    MCH 28.4 26.0 - 34.0 pg    MCHC 31.2 (L) 32.0 - 36.0 g/dL    RDW 16.6 (H) 11.5 - 14.5 %    Platelets 171 150 - 450 x10*3/uL   Basic metabolic panel    Collection Time: 06/15/24  3:46 AM   Result Value Ref Range    Glucose 115 (H) 74 - 99 mg/dL    Sodium 137 136 - 145 mmol/L    Potassium 4.5 3.5 - 5.3 mmol/L    Chloride 108 (H)  98 - 107 mmol/L    Bicarbonate 25 21 - 32 mmol/L    Anion Gap 9 (L) 10 - 20 mmol/L    Urea Nitrogen 17 6 - 23 mg/dL    Creatinine 0.90 0.50 - 1.05 mg/dL    eGFR 73 >60 mL/min/1.73m*2    Calcium 8.8 8.6 - 10.3 mg/dL   SST TOP    Collection Time: 06/15/24  3:46 AM   Result Value Ref Range    Extra Tube Hold for add-ons.          Assessment:     Status Post left knee irrigation and debridement. Doing well postoperatively.     Acute postoperative pain: Reports mild to moderate pain to operative extremity.  Exacerbated by movement, relieved by rest ice and pain medication.  Continue current pain management.    Dr. Reyes and ID consults pending at this time.     Preliminary tissue/wound culture shows no growth.       Plan:      1: Continues current post-op course :  2:  Continue Deep venous thrombosis prophylaxis: Lovenox 40 mg subcutaneous daily   3:  Continue physical therapy: WBAT in knee immobilizer at all times with exception of skin care and hygiene. Ok for gentle ROM 0-60 degrees.   4:  Continue Pain Control  5.   Discharge planning: TBD pending culture reports. SW and TCC following for discharge planning.     Laverne Santos, APRN-CNP

## 2024-06-15 NOTE — CARE PLAN
The patient's goals for the shift include      The clinical goals for the shift include Maintain safety and pain management    Problem: Pain - Adult  Goal: Verbalizes/displays adequate comfort level or baseline comfort level  Outcome: Progressing     Problem: Safety - Adult  Goal: Free from fall injury  Outcome: Progressing     Problem: Discharge Planning  Goal: Discharge to home or other facility with appropriate resources  Outcome: Progressing     Problem: Chronic Conditions and Co-morbidities  Goal: Patient's chronic conditions and co-morbidity symptoms are monitored and maintained or improved  Outcome: Progressing     Problem: Pain  Goal: Takes deep breaths with improved pain control throughout the shift  Outcome: Progressing  Goal: Turns in bed with improved pain control throughout the shift  Outcome: Progressing  Goal: Walks with improved pain control throughout the shift  Outcome: Progressing  Goal: Performs ADL's with improved pain control throughout shift  Outcome: Progressing  Goal: Participates in PT with improved pain control throughout the shift  Outcome: Progressing  Goal: Free from opioid side effects throughout the shift  Outcome: Progressing  Goal: Free from acute confusion related to pain meds throughout the shift  Outcome: Progressing

## 2024-06-15 NOTE — PROGRESS NOTES
Physical Therapy    Physical Therapy Evaluation    Patient Name: Lisa Guerrero  MRN: [id number]  Today's Date: 6/15/2024   Time Calculation  Start Time: 0846  Stop Time: 0910  Time Calculation (min): 24 min  814/814-A    Assessment/Plan   PT Assessment  PT Assessment Results: Decreased strength, Decreased range of motion, Decreased endurance, Impaired balance, Decreased mobility, Decreased safety awareness, Orthopedic restrictions  Rehab Prognosis: Good  End of Session Communication: Bedside nurse  Assessment Comment: Pt presents with decreased functional mobility following L knee I&D. Pt with decreased strength, balance, and safety awareness. Pt will benefit from continued skilled PT to address above deficits during hospital stay.  End of Session Patient Position: Up in chair, Alarm on  IP OR SWING BED PT PLAN  Inpatient or Swing Bed: Inpatient  PT Plan  Treatment/Interventions: Bed mobility, Transfer training, Gait training, Stair training, Balance training, Neuromuscular re-education, Strengthening, Endurance training, Range of motion, Therapeutic exercise, Therapeutic activity, Home exercise program  PT Plan: Ongoing PT  PT Frequency: Daily  PT Discharge Recommendations: Low intensity level of continued care  PT Recommended Transfer Status: Assist x1, Assistive device  PT - OK to Discharge: Yes (PT eval complete, ok to d/c once deemed medically appropriate.)    Subjective     Current Problem:  1. Disruption of external operation (surgical) wound, not elsewhere classified, initial encounter  Tissue/Wound Culture/Smear    Tissue/Wound Culture/Smear    Tissue/Wound Culture/Smear    Tissue/Wound Culture/Smear        Patient Active Problem List   Diagnosis    Chronic obstructive pulmonary disease (Multi)    Depression    GERD (gastroesophageal reflux disease)    Hypertension    Insomnia    Jugular vein thrombosis    Nicotine dependence, cigarettes, uncomplicated    Nonischemic dilated cardiomyopathy (Multi)    PCOD  (polycystic ovarian disease)    Panic attacks    Arthritis of sacroiliac joint of both sides    Lumbar facet arthropathy    Chronic left-sided lumbar radiculopathy    Lumbar disc disease    Factor V deficiency (Multi)    Trochanteric bursitis of left hip    HERNANDO (obstructive sleep apnea)    Arthritis    Abnormal EKG    Arthritis of hip    Chronic kidney disease    Dyspnea on minimal exertion    Dyspnea    Factor V Leiden (Multi)    Fatty liver    Hyperkalemia    Hypokalemia    Iron deficiency anemia    Lactic acidosis    Liver mass    Mitral regurgitation    Nonsustained ventricular tachycardia (Multi)    Osteoarthritis of left hip    Obesity, Class III, BMI 40-49.9 (morbid obesity) (Multi)    Pulmonary edema (HHS-HCC)    Pneumonia    Personal history of DVT (deep vein thrombosis)    History of repair of hip joint    Hypoxia    Acute on chronic overt combined systolic and diastolic congestive heart failure (Multi)    Chronic systolic heart failure (Multi)    Renal infarction (Multi)    Esophageal dysphagia    Hypotension    Venous thrombosis    Class 3 severe obesity in adult (Multi)    Acute left ankle pain    Acute foot pain, left    Disruption of external operation (surgical) wound, not elsewhere classified, initial encounter    Knee pain       General Visit Information:  General  Reason for Referral: recent surgery  Referred By: Litzy PT/OT eval and treat  Past Medical History Relevant to Rehab: COPD, HERNANDO, HTN, CHF, depression, anxiety, anemia, GERD, gout, PNA, panic attacks, esophageal dysphagia, fatty liver, Factor V Leiden, OA, PCOD, L ADAMS, smoker  Family/Caregiver Present: No  Co-Treatment: OT  Prior to Session Communication: Bedside nurse  Patient Position Received: Bed, 3 rail up, Alarm off, not on at start of session  General Comment: Pt s/p I&D and wound vac placement 6/14/24 by Dr. Mcghee. Pt originally underwent ORIF L patella 5/3/24. Per surgical note, plan to remove hardware in 1 month.    Home  "Living:  Home Living  Home Living Comments: Pt lives with spouse, son and grandson in ranch home with basement. 1 TONY with handrail. Tub shower, no seat or grab bars. Basement laundry, bilateral handrails on steps.Owns FWW and crutches.    Prior Level of Function:  Prior Function Per Pt/Caregiver Report  Prior Function Comments: Pt stating independent with all ADLs and IADLs prior to patella fx. After surgery was able to do all ADLS with use of FWW in house, and crutches for community mobility. Pt stating was able to complete most IADLs but family assisted when she was fatigued.    Precautions:  Precautions  LE Weight Bearing Status: Weight Bearing as Tolerated (LLE)  Post-Surgical Precautions:  (WBAT in knee immobilizer at all times with exception of skin care and hygiene. Ok for gentle ROM 0-60 degrees.)  Braces Applied: Knee immobilizer L LE    Vital Signs:     Objective     Pain:  Pain Assessment  Pain Assessment: 0-10 (\"tightness\" in L knee)  Pain Score: 0 - No pain    Cognition:  Cognition  Overall Cognitive Status: Within Functional Limits  Insight:  (decreased safety awareness)    General Assessments:  General Observation  General Observation: Purewick external catheter (discontinued), KI (donned throughout treatment), wound vac L knee   Activity Tolerance  Endurance: Endurance does not limit participation in activity     Strength  Strength Comments: RLE MMT 4/5 overall; LLE hip flex 2-/5, knee ext NT recent surgery, ankle DF 4-/5        Postural Control  Postural Control: Within Functional Limits  Static Sitting Balance  Static Sitting-Comment/Number of Minutes: Good  Dynamic Sitting Balance  Dynamic Sitting-Comments: Fair +  Static Standing Balance  Static Standing-Comment/Number of Minutes: Fair +  Dynamic Standing Balance  Dynamic Standing-Comments: Fair +    Functional Assessments:     Bed Mobility  Bed Mobility: Yes  Bed Mobility 1  Bed Mobility 1: Supine to sitting  Level of Assistance 1: Minimum " "assistance  Bed Mobility Comments 1: Min A for safety, pt scooting close to EOB stating \"this is how I have to do this\".  Transfers  Transfer: Yes  Transfer 1  Technique 1: Sit to stand, Stand to sit  Transfer Device 1: Gait belt (FWW)  Transfer Level of Assistance 1:  (SBA)  Trials/Comments 1: Cues to put feet inside of FWW, cues for hand placement; pt with limited acceptance of therapist assist  Ambulation/Gait Training  Ambulation/Gait Training Performed: Yes  Ambulation/Gait Training 1  Surface 1: Level tile  Device 1: Rolling walker  Gait Support Devices: Gait belt  Assistance 1:  (SBA)  Comments/Distance (ft) 1: Pt ambulates ~100' with step to gait pattern with RLE leading. Decreased L foot clearance secondary to KI maintaining knee extension. C/o slight dizziness after ambulation.          Extremity/Trunk Assessments:        RLE   RLE : Within Functional Limits  LLE   LLE :  (hip WFL; knee NT, maintained extension throughout evaluation; ankle WFL)    Outcome Measures:     [facility] Basic Mobility  Turning from your back to your side while in a flat bed without using bedrails: None  Moving from lying on your back to sitting on the side of a flat bed without using bedrails: A little  Moving to and from bed to chair (including a wheelchair): A little  Standing up from a chair using your arms (e.g. wheelchair or bedside chair): A little  To walk in hospital room: A little  Climbing 3-5 steps with railing: A little  Basic Mobility - Total Score: 19                                        Goals:  Encounter Problems       Encounter Problems (Active)       PT Problem       Pt will demonstrate sup > sit and sit > sup bed mobility mod I (Progressing)       Start:  06/15/24    Expected End:  06/29/24            Pt will demo sit > stand and stand > sit transfer with FWW and mod I  (Progressing)       Start:  06/15/24    Expected End:  06/29/24            Pt will ambulate 75' with FWW and mod I, without LOB  (Progressing)  "      Start:  06/15/24    Expected End:  06/29/24            Pt will demo up/down 1 steps with handrail and SBA (Progressing)       Start:  06/15/24    Expected End:  06/29/24            Pt will demo 0-60 PROM L knee (Progressing)       Start:  06/15/24    Expected End:  06/29/24                   Pain - Adult            Education Documentation  Precautions, taught by Ibis Casas, PT at 6/15/2024 12:10 PM.  Learner: Patient  Readiness: Acceptance  Method: Explanation  Response: Needs Reinforcement  Comment: POC and precautions    Mobility Training, taught by Ibis Casas, PT at 6/15/2024 12:10 PM.  Learner: Patient  Readiness: Acceptance  Method: Explanation  Response: Needs Reinforcement  Comment: POC and precautions    Education Comments  No comments found.

## 2024-06-15 NOTE — PROGRESS NOTES
"Occupational Therapy    Evaluation    Patient Name: Lisa Guerrero  MRN: [id number]  Today's Date: 6/15/2024  Time Calculation  Start Time: 0847  Stop Time: 0910  Time Calculation (min): 23 min    Assessment  IP OT Assessment  End of Session Communication: Bedside nurse  End of Session Patient Position: Up in chair, Alarm on (LEs elevated, KI on LLE, all needs in reach)  Plan:  Treatment Interventions: ADL retraining, Functional transfer training, Patient/family training, Equipment evaluation/education, Compensatory technique education  OT Frequency: 2 times per week  OT Discharge Recommendations: Low intensity level of continued care  OT - OK to Discharge: Yes (when medically appropriate)    Subjective   Current Problem:  1. Disruption of external operation (surgical) wound, not elsewhere classified, initial encounter  Tissue/Wound Culture/Smear    Tissue/Wound Culture/Smear    Tissue/Wound Culture/Smear    Tissue/Wound Culture/Smear        General:  General  Reason for Referral: recent surgery  Referred By: Litzy PT/OT eval and treat  Past Medical History Relevant to Rehab: Includes: COPD, HERNANDO, HTN, CHF, depression, anxiety, anemia, GERD, gout, PNA, panic attacks, esophageal dysphagia, fatty liver, Factor V Leiden, OA, PCOD, L ADAMS, smoker  Family/Caregiver Present: No  Co-Treatment: PT  Prior to Session Communication: Bedside nurse  Patient Position Received: Bed, 3 rail up, Alarm off, not on at start of session (LLE KI on upon arrival and throughout session.  KI remained on/intact at end of session.)  General Comment: Pt s/p I&D and wound vac placement 6/14/24 by Dr. Mcghee. Pt originally underwent ORIF L patella 5/3/24. Per surgical note, plan to remove hardware in 1 month.  Precautions:  LE Weight Bearing Status: Weight Bearing as Tolerated (LLE)  Medical Precautions: Fall precautions  Post-Surgical Precautions:  (L knee wound VAC and KI)  Precautions Comment: \"WBAT in knee immobilizer at all times with exception " "of skin care and hygiene. Ok for gentle ROM 0-60 degrees.\"  Vital Signs:     Pain:  Pain Assessment  Pain Assessment: 0-10  Pain Score: 0 - No pain  Pain Type:  (Pt. just reports tightness, states \"It's not pain.\")    Objective   Cognition:  Overall Cognitive Status: Within Functional Limits  Insight:  (decreased safety awareness, anxious and impulsive at times)           Home Living:  Home Living Comments: Pt lives with spouse, son and grandson in ranch home with basement. 1 TONY with handrail. Tub shower, no seat or grab bars. Basement laundry, bilateral handrails on steps.Owns FWW and crutches.   Prior Function:  Prior Function Comments: Pt stating independent with all ADLs and IADLs prior to patella fx. After surgery was able to do all ADLS with use of FWW in house, and crutches for community mobility. Pt stating was able to complete most IADLs but family assisted when she was fatigued.  IADL History:     ADL:  Eating Assistance: Independent  Grooming Assistance: Stand by  Bathing Assistance: Moderate  UE Dressing Deficit: Setup  LE Dressing Assistance: Maximal  Toileting Assistance with Device: Minimal  Functional Assistance: Minimal  Activity Tolerance:  Endurance: Endurance does not limit participation in activity  Bed Mobility/Transfers: Bed Mobility  Bed Mobility: Yes  Bed Mobility 1  Bed Mobility Comments 1: Min assist sup to sit 2nd to impulsivity and scooting very close to EOB.  Pt. used gait belt as leg  to move LLE to EOB.  Required min assist for safety at EOB.           Functional Mobility:  Functional Mobility  Functional Mobility Performed: Yes (ambulated in room and hallway with FWW)  Sitting Balance:  Static Sitting Balance  Static Sitting-Comment/Number of Minutes: Fair (-) at EOB  Dynamic Sitting Balance  Dynamic Sitting-Comments: Fair (-) at EOB  Standing Balance:  Static Standing Balance  Static Standing-Comment/Number of Minutes: Fair  Dynamic Standing Balance  Dynamic " Standing-Comments: Fair/fair (-)    Strength:  Strength Comments: 4/5 MMT bilat. UEs       Extremities: RUE   RUE : Within Functional Limits and LUE   LUE: Within Functional Limits    Outcome Measures: [facility] Daily Activity  Putting on and taking off regular lower body clothing: A lot  Bathing (including washing, rinsing, drying): A lot  Putting on and taking off regular upper body clothing: A little  Toileting, which includes using toilet, bedpan or urinal: A little  Taking care of personal grooming such as brushing teeth: A little  Eating Meals: None  Daily Activity - Total Score: 17      Education Documentation  Body Mechanics, taught by Faiza Dotson OT at 6/15/2024  4:11 PM.  Learner: Patient  Readiness: Acceptance  Method: Explanation  Response: Verbalizes Understanding    Precautions, taught by Faiza Dotson OT at 6/15/2024  4:11 PM.  Learner: Patient  Readiness: Acceptance  Method: Explanation  Response: Verbalizes Understanding    ADL Training, taught by Faiza Dotson OT at 6/15/2024  4:11 PM.  Learner: Patient  Readiness: Acceptance  Method: Explanation  Response: Verbalizes Understanding    Education Comments  No comments found.      Goals:   Encounter Problems       Encounter Problems (Active)       OT Goals       Mod I ADL functional mobility with FWW.        Start:  06/15/24    Expected End:  06/29/24            Mod I sit/stand, bed/chair/commode transfers with FWW.        Start:  06/15/24    Expected End:  06/29/24            Mod I bed mobility.        Start:  06/15/24    Expected End:  06/29/24            Mod I LB dressing with AE as needed.        Start:  06/15/24    Expected End:  06/29/24            Mod I LB bathing with AE as needed.        Start:  06/15/24    Expected End:  06/29/24

## 2024-06-16 VITALS
DIASTOLIC BLOOD PRESSURE: 62 MMHG | BODY MASS INDEX: 42.15 KG/M2 | WEIGHT: 246.91 LBS | RESPIRATION RATE: 18 BRPM | OXYGEN SATURATION: 93 % | HEIGHT: 64 IN | TEMPERATURE: 97.7 F | SYSTOLIC BLOOD PRESSURE: 115 MMHG | HEART RATE: 61 BPM

## 2024-06-16 LAB
B-LACTAMASE ORGANISM ISLT: POSITIVE
BACTERIA SPEC CULT: ABNORMAL
GRAM STN SPEC: ABNORMAL

## 2024-06-16 PROCEDURE — 2500000002 HC RX 250 W HCPCS SELF ADMINISTERED DRUGS (ALT 637 FOR MEDICARE OP, ALT 636 FOR OP/ED)

## 2024-06-16 PROCEDURE — 96372 THER/PROPH/DIAG INJ SC/IM: CPT | Performed by: ORTHOPAEDIC SURGERY

## 2024-06-16 PROCEDURE — 2500000001 HC RX 250 WO HCPCS SELF ADMINISTERED DRUGS (ALT 637 FOR MEDICARE OP): Performed by: ANESTHESIOLOGY

## 2024-06-16 PROCEDURE — 97116 GAIT TRAINING THERAPY: CPT | Mod: GP,CQ

## 2024-06-16 PROCEDURE — 2500000001 HC RX 250 WO HCPCS SELF ADMINISTERED DRUGS (ALT 637 FOR MEDICARE OP): Performed by: ORTHOPAEDIC SURGERY

## 2024-06-16 PROCEDURE — 2500000004 HC RX 250 GENERAL PHARMACY W/ HCPCS (ALT 636 FOR OP/ED): Mod: JZ | Performed by: INTERNAL MEDICINE

## 2024-06-16 PROCEDURE — S4991 NICOTINE PATCH NONLEGEND: HCPCS

## 2024-06-16 PROCEDURE — 7100000011 HC EXTENDED STAY RECOVERY HOURLY - NURSING UNIT

## 2024-06-16 PROCEDURE — 97530 THERAPEUTIC ACTIVITIES: CPT | Mod: GP,CQ

## 2024-06-16 PROCEDURE — 2500000001 HC RX 250 WO HCPCS SELF ADMINISTERED DRUGS (ALT 637 FOR MEDICARE OP)

## 2024-06-16 PROCEDURE — 2500000004 HC RX 250 GENERAL PHARMACY W/ HCPCS (ALT 636 FOR OP/ED): Performed by: ORTHOPAEDIC SURGERY

## 2024-06-16 RX ORDER — AZTREONAM 2 G/1
2 INJECTION, POWDER, LYOPHILIZED, FOR SOLUTION INTRAMUSCULAR; INTRAVENOUS EVERY 8 HOURS
Status: DISCONTINUED | OUTPATIENT
Start: 2024-06-16 | End: 2024-06-17

## 2024-06-16 RX ADMIN — AZTREONAM 2 G: 2 INJECTION, POWDER, LYOPHILIZED, FOR SOLUTION INTRAMUSCULAR; INTRAVENOUS at 17:36

## 2024-06-16 RX ADMIN — BUMETANIDE 2 MG: 1 TABLET ORAL at 12:49

## 2024-06-16 RX ADMIN — GABAPENTIN 100 MG: 100 CAPSULE ORAL at 09:28

## 2024-06-16 RX ADMIN — ENOXAPARIN SODIUM 40 MG: 40 INJECTION SUBCUTANEOUS at 09:27

## 2024-06-16 RX ADMIN — DOCUSATE SODIUM 100 MG: 100 CAPSULE, LIQUID FILLED ORAL at 21:38

## 2024-06-16 RX ADMIN — AZTREONAM 2 G: 2 INJECTION, POWDER, LYOPHILIZED, FOR SOLUTION INTRAMUSCULAR; INTRAVENOUS at 09:29

## 2024-06-16 RX ADMIN — OXYCODONE HYDROCHLORIDE 5 MG: 5 TABLET ORAL at 12:35

## 2024-06-16 RX ADMIN — OXYCODONE HYDROCHLORIDE 10 MG: 5 TABLET ORAL at 19:41

## 2024-06-16 RX ADMIN — SACUBITRIL AND VALSARTAN 1 TABLET: 24; 26 TABLET, FILM COATED ORAL at 12:49

## 2024-06-16 RX ADMIN — DOCUSATE SODIUM 100 MG: 100 CAPSULE, LIQUID FILLED ORAL at 09:28

## 2024-06-16 RX ADMIN — GABAPENTIN 100 MG: 100 CAPSULE ORAL at 21:38

## 2024-06-16 RX ADMIN — GABAPENTIN 100 MG: 100 CAPSULE ORAL at 17:38

## 2024-06-16 RX ADMIN — OXYCODONE HYDROCHLORIDE 10 MG: 5 TABLET ORAL at 00:43

## 2024-06-16 RX ADMIN — SACUBITRIL AND VALSARTAN 1 TABLET: 24; 26 TABLET, FILM COATED ORAL at 21:38

## 2024-06-16 RX ADMIN — METOPROLOL SUCCINATE 100 MG: 50 TABLET, EXTENDED RELEASE ORAL at 12:48

## 2024-06-16 RX ADMIN — SERTRALINE HYDROCHLORIDE 100 MG: 50 TABLET, FILM COATED ORAL at 09:27

## 2024-06-16 RX ADMIN — DAPTOMYCIN 500 MG: 500 INJECTION, POWDER, LYOPHILIZED, FOR SOLUTION INTRAVENOUS at 12:37

## 2024-06-16 ASSESSMENT — PAIN SCALES - GENERAL
PAINLEVEL_OUTOF10: 6
PAINLEVEL_OUTOF10: 6
PAINLEVEL_OUTOF10: 3
PAINLEVEL_OUTOF10: 0 - NO PAIN
PAINLEVEL_OUTOF10: 2

## 2024-06-16 ASSESSMENT — COGNITIVE AND FUNCTIONAL STATUS - GENERAL
WALKING IN HOSPITAL ROOM: A LITTLE
MOBILITY SCORE: 16
CLIMB 3 TO 5 STEPS WITH RAILING: A LOT
MOVING TO AND FROM BED TO CHAIR: A LITTLE
MOBILITY SCORE: 24
STANDING UP FROM CHAIR USING ARMS: A LITTLE
MOVING FROM LYING ON BACK TO SITTING ON SIDE OF FLAT BED WITH BEDRAILS: A LITTLE
TURNING FROM BACK TO SIDE WHILE IN FLAT BAD: A LOT

## 2024-06-16 ASSESSMENT — PAIN DESCRIPTION - LOCATION: LOCATION: KNEE

## 2024-06-16 ASSESSMENT — PAIN - FUNCTIONAL ASSESSMENT
PAIN_FUNCTIONAL_ASSESSMENT: 0-10

## 2024-06-16 ASSESSMENT — PAIN DESCRIPTION - ORIENTATION: ORIENTATION: ANTERIOR;LEFT;POSTERIOR

## 2024-06-16 ASSESSMENT — PAIN DESCRIPTION - DESCRIPTORS: DESCRIPTORS: BURNING;THROBBING

## 2024-06-16 NOTE — CONSULTS
"  Infectious Disease    Patient Name: Lisa Guerrero  Date: 6/16/2024  YOB: 1965  Medical Record Number: [id number]        No chief complaint on file.        History of Present Illness:  Patient hx of  a comminuted patella fracture  end of April and had surgical repair at that time . Surgical incision did not fully close since then , had apparent significant blistering and swelling time of surgery, and overall poor tissue quality prior to surgery . During follow-up she was noted by orthopedics to have wound dehiscence with some evidence of infection and necrosis anterior to the wound . Was brought in for incisional revision and exploration    Surgery 6/14  Diagnosis  Pre-op Diagnosis     * Disruption of external operation (surgical) wound, not elsewhere classified, initial encounter [T81.31XA] Post-op Diagnosis     * Disruption of external operation (surgical) wound, not elsewhere classified, initial encounter [T81.31XA]      Procedures  Irrigation and Debridement Knee with Wound Vac Placement  86318 - PA I&D DEEP ABSC BURSA/HEMATOMA THIGH/KNEE REGION      From op note :   \"Sharp incision was made to debride the skin and subcutaneous tissue as well as some of the fascia and retinacular tissue until healthy and stable margins were obtained.  There was some purulent tissue.  There was no communication with the joint and the extensor mechanism was intact.  Cultures were taken x 2.     Using sharp dissection, rongeur, curette the nonviable tissue was removed until we had healthy bleeding and stable tissue.  The hardware was exposed.  We noted normal appearance of the fracture with no evidence of nonunion.\"    Currently with wound vac, pain controlled . No fevers. Denies antibiotic use prior to surgery      Review of Systems: All other ROS reviewed and are negative other than as stated in HPI            Social History     Tobacco Use    Smoking status: Every Day     Current packs/day: 0.25     Types: " Cigarettes    Smokeless tobacco: Never   Vaping Use    Vaping status: Never Used   Substance Use Topics    Alcohol use: Yes     Comment: one drink every 3 months    Drug use: Never         Past Medical History:   Diagnosis Date    Acute on chronic diastolic heart failure with preserved ejection fraction (Multi) 04/26/2023    Anxiety     Arthritis     CHF (congestive heart failure) (Multi)     Depression     Factor V Leiden (Multi)     Fractures     Gout     Hepatomegaly, not elsewhere classified     Liver mass    History of blood clots     carotid/kidney    PONV (postoperative nausea and vomiting)     Psoriasis     Skin disorder     Sleep apnea     needs a cpap    Snores     Unsteady gait     uses walker    Uses crutches     Wears glasses     driving           Past Surgical History:   Procedure Laterality Date    CARDIAC CATHETERIZATION      CHOLECYSTECTOMY      HIP ARTHROPLASTY Left     LEG SURGERY Left     TUBAL LIGATION             Current Facility-Administered Medications:     benzocaine-menthol (Cepastat Sore Throat) lozenge 1 lozenge, 1 lozenge, Mouth/Throat, q4h PRN, Favian Mcghee MD    bisacodyl (Dulcolax) EC tablet 10 mg, 10 mg, oral, Daily PRN, Favian Mcghee MD    [Held by provider] bumetanide (Bumex) tablet 2 mg, 2 mg, oral, BID, TONY Batista-CNP    cyclobenzaprine (Flexeril) tablet 10 mg, 10 mg, oral, TID PRN, Favian Mcghee MD, 10 mg at 06/14/24 2044    diphenhydrAMINE (BENADryl) injection 12.5 mg, 12.5 mg, intravenous, q6h PRN, Favian Mcghee MD    docusate sodium (Colace) capsule 100 mg, 100 mg, oral, BID, Favian Mcghee MD, 100 mg at 06/15/24 2122    enoxaparin (Lovenox) syringe 40 mg, 40 mg, subcutaneous, Daily, Favian Mcghee MD, 40 mg at 06/15/24 0841    gabapentin (Neurontin) capsule 100 mg, 100 mg, oral, TID, MONTEZ Batista, 100 mg at 06/15/24 2122    HYDROmorphone (Dilaudid) injection 0.5 mg, 0.5 mg, intravenous, q4h PRN, Favian Mcghee MD    [Held by provider]  metoprolol succinate XL (Toprol-XL) 24 hr tablet 100 mg, 100 mg, oral, Daily, MONTEZ Batista    midazolam (Versed) injection 1 mg, 1 mg, intravenous, Once, Dwayne Staton MD    morphine injection 2 mg, 2 mg, intravenous, q2h PRN, Favian Mcghee MD    naloxone (Narcan) injection 0.2 mg, 0.2 mg, intravenous, q5 min PRN, Favian Mcghee MD    nicotine (Nicoderm CQ) 14 mg/24 hr patch 1 patch, 1 patch, transdermal, Daily, MONTEZ Batista    ondansetron (Zofran) tablet 4 mg, 4 mg, oral, q8h PRN **OR** ondansetron (Zofran) injection 4 mg, 4 mg, intravenous, q8h PRN, Favian Mcghee MD    oxyCODONE (Roxicodone) immediate release tablet 10 mg, 10 mg, oral, q6h PRN, Favian Mcghee MD, 10 mg at 06/16/24 0043    oxyCODONE (Roxicodone) immediate release tablet 5 mg, 5 mg, oral, q4h PRN, Michel Burroughs MD    oxygen (O2) therapy, 2 L/min, inhalation, Continuous, Favian Mcghee MD, Last Rate: 180,000 mL/hr at 06/14/24 1615, 3 L/min at 06/14/24 1615    prochlorperazine (Compazine) tablet 10 mg, 10 mg, oral, q6h PRN **OR** prochlorperazine (Compazine) injection 10 mg, 10 mg, intravenous, q6h PRN **OR** prochlorperazine (Compazine) suppository 25 mg, 25 mg, rectal, q12h PRN, Favian Mcghee MD    [Held by provider] sacubitriL-valsartan (Entresto) 24-26 mg per tablet 1 tablet, 1 tablet, oral, BID, MONTEZ Batista    sertraline (Zoloft) tablet 100 mg, 100 mg, oral, Daily, MONTEZ Batista, 100 mg at 06/15/24 0841     Allergies   Allergen Reactions    Ceftriaxone Hives    Codeine Hives    Penicillins Hives    Percocet [Oxycodone-Acetaminophen] GI Upset and Nausea/vomiting    Sacubitril-Valsartan Headache and Dizziness     Nightmares, dry mouth    Sulfamethoxazole-Trimethoprim Hives    Hydrogen Peroxide Rash     Burning/swelling    Latex Rash     Blisters           Family History   Problem Relation Name Age of Onset    Aneurysm Mother      No Known Problems Father           Physical  "Exam:    Blood pressure 141/65, pulse 72, temperature 36.2 °C (97.2 °F), resp. rate 17, height 1.626 m (5' 4\"), weight 112 kg (246 lb 14.6 oz), SpO2 95%.  General: Patient appears ok at the present time. NAD  Skin: surgically dressed   HEENT:  Neck is supple, No subconjunctival hemorrhages, no oral exudates  Heart: S1 S2  Lungs: clear bilaterally  Abdomen: soft, ND, NTTP,  Back :no CVA tenderness  Extrem: as above  Neuro exam: CN II-XII intact  Psych: cooperative    Labs:  I have reviewed all lab results by electronic record, including most recent CBC, metabolic panel, and pertinent abnormalities were addressed from an infectious disease perspective.  Trends are being monitored over time.    Lab Results   Component Value Date    WBC 8.7 06/15/2024    HGB 12.0 06/15/2024    HCT 38.5 06/15/2024    MCV 91 06/15/2024     06/15/2024     Lab Results   Component Value Date    GLUCOSE 115 (H) 06/15/2024    CALCIUM 8.8 06/15/2024     06/15/2024    K 4.5 06/15/2024    CO2 25 06/15/2024     (H) 06/15/2024    BUN 17 06/15/2024    CREATININE 0.90 06/15/2024       Radiology:  I have reviewed imaging results per electronic record and most pertinent abnormalities are being addressed from an infectious disease standpoint.            ASSESSMENT:  Problem List Items Addressed This Visit          Other    * (Principal) Disruption of external operation (surgical) wound, not elsewhere classified, initial encounter    Relevant Orders    Tissue/Wound Culture/Smear (Completed)    Tissue/Wound Culture/Smear (Completed)      Poor incisional wound healing and infection soft issues observed surgical site ,prior patella repair.     No clear joint involvement from  surgery, possible hardware contamination not excluded from what I can tell from op note    Mutliple drug allergies    PLAN:   Broad spectrum antibiotics  Await surgical cultures  Ok for PICC   "

## 2024-06-16 NOTE — CARE PLAN
The patient's goals for the shift include      The clinical goals for the shift include patient will have acceptable pain management      Problem: Pain - Adult  Goal: Verbalizes/displays adequate comfort level or baseline comfort level  Outcome: Progressing     Problem: Safety - Adult  Goal: Free from fall injury  Outcome: Progressing     Problem: Discharge Planning  Goal: Discharge to home or other facility with appropriate resources  Outcome: Progressing     Problem: Chronic Conditions and Co-morbidities  Goal: Patient's chronic conditions and co-morbidity symptoms are monitored and maintained or improved  Outcome: Progressing     Problem: Pain  Goal: Takes deep breaths with improved pain control throughout the shift  Outcome: Progressing  Goal: Turns in bed with improved pain control throughout the shift  Outcome: Progressing  Goal: Walks with improved pain control throughout the shift  Outcome: Progressing  Goal: Performs ADL's with improved pain control throughout shift  Outcome: Progressing  Goal: Participates in PT with improved pain control throughout the shift  Outcome: Progressing  Goal: Free from opioid side effects throughout the shift  Outcome: Progressing  Goal: Free from acute confusion related to pain meds throughout the shift  Outcome: Progressing     Problem: Skin  Goal: Promote skin healing  Outcome: Progressing  Flowsheets (Taken 6/16/2024 8827)  Promote skin healing:   Assess skin/pad under line(s)/device(s)   Ensure correct size (line/device) and apply per  instructions

## 2024-06-16 NOTE — PROGRESS NOTES
"Daily Progress Note    Lisa Guerrero is a 59 y.o. female on day 0 of admission presenting with Disruption of external operation (surgical) wound, not elsewhere classified, initial encounter.    Subjective   Patient sitting up in chair with no complaints.  Appreciate ID recommendations.  Denies shortness of breath or chest pain, remains on room air.       Objective     Physical Exam    Physical Exam  HENT:      Head: Normocephalic.      Nose: Nose normal.      Mouth/Throat:      Mouth: Mucous membranes are moist.   Eyes:      Pupils: Pupils are equal, round, and reactive to light.   Cardiovascular:      Rate and Rhythm: Normal rate and regular rhythm.      Heart sounds: Normal heart sounds, S1 normal and S2 normal.   Pulmonary:      Effort: Pulmonary effort is normal.      Breath sounds: Normal breath sounds.   Abdominal:      General: Bowel sounds are normal.      Palpations: Abdomen is soft.   Musculoskeletal:      Cervical back: Neck supple.      Left lower leg: Tenderness present.      Comments: Immobilizer in place   Skin:     General: Skin is warm.   Neurological:      Mental Status: She is alert and oriented to person, place, and time.   Psychiatric:         Mood and Affect: Mood normal.         Behavior: Behavior normal.         Last Recorded Vitals  Blood pressure 139/67, pulse 80, temperature 36.8 °C (98.2 °F), resp. rate 18, height 1.626 m (5' 4\"), weight 112 kg (246 lb 14.6 oz), SpO2 97%.  Intake/Output last 3 Shifts:  I/O last 3 completed shifts:  In: 1086.7 (9.7 mL/kg) [I.V.:586.7 (5.2 mL/kg); IV Piggyback:500]  Out: 4800 (42.9 mL/kg) [Urine:4800 (1.2 mL/kg/hr)]  Weight: 112 kg     Medications  Scheduled medications  aztreonam, 2 g, intravenous, q8h  bumetanide, 2 mg, oral, BID  daptomycin, 500 mg, intravenous, Daily  docusate sodium, 100 mg, oral, BID  enoxaparin, 40 mg, subcutaneous, Daily  gabapentin, 100 mg, oral, TID  metoprolol succinate XL, 100 mg, oral, Daily  midazolam, 1 mg, intravenous, " Once  nicotine, 1 patch, transdermal, Daily  sacubitriL-valsartan, 1 tablet, oral, BID  sertraline, 100 mg, oral, Daily      Continuous medications  oxygen, 2 L/min, Last Rate: 3 L/min (06/14/24 1615)      PRN medications  PRN medications: benzocaine-menthol, bisacodyl, cyclobenzaprine, diphenhydrAMINE, HYDROmorphone, morphine, naloxone, ondansetron **OR** ondansetron, oxyCODONE, oxyCODONE, prochlorperazine **OR** prochlorperazine **OR** prochlorperazine    Labs  CBC:   Results from last 7 days   Lab Units 06/15/24  0346 06/14/24  1144   WBC AUTO x10*3/uL 8.7 7.2   RBC AUTO x10*6/uL 4.23 5.00   HEMOGLOBIN g/dL 12.0 14.3   HEMATOCRIT % 38.5 44.9   MCV fL 91 90   MCH pg 28.4 28.6   MCHC g/dL 31.2* 31.8*   RDW % 16.6* 16.4*   PLATELETS AUTO x10*3/uL 171 191     CMP:    Results from last 7 days   Lab Units 06/15/24  0346 06/14/24  1144   SODIUM mmol/L 137 143   POTASSIUM mmol/L 4.5 4.4   CHLORIDE mmol/L 108* 113*   CO2 mmol/L 25 24   BUN mg/dL 17 16   CREATININE mg/dL 0.90 0.85   GLUCOSE mg/dL 115* 91   PROTEIN TOTAL g/dL  --  6.9   CALCIUM mg/dL 8.8 9.4   BILIRUBIN TOTAL mg/dL  --  0.3   ALK PHOS U/L  --  69   AST U/L  --  5*   ALT U/L  --  7     BMP:    Results from last 7 days   Lab Units 06/15/24  0346 06/14/24  1144   SODIUM mmol/L 137 143   POTASSIUM mmol/L 4.5 4.4   CHLORIDE mmol/L 108* 113*   CO2 mmol/L 25 24   BUN mg/dL 17 16   CREATININE mg/dL 0.90 0.85   CALCIUM mg/dL 8.8 9.4   GLUCOSE mg/dL 115* 91     Magnesium:    Troponin:      BNP:     Lipid Panel:  Results from last 7 days   Lab Units 06/14/24  1144   INR  1.1   PROTIME seconds 12.0        Relevant Results  Results from last 7 days   Lab Units 06/15/24  0346 06/14/24  1144   GLUCOSE mg/dL 115* 91     Lab Results   Component Value Date    HGBA1C CANCELED 07/03/2023        Assessment/Plan    Left knee pain with wound dehiscence  Acute on chronic diastolic CHF  Anxiety  Arthritis  Depression  Factor V Leiden  Gout  Hepatomegaly    -Ortho primary  team  -Admit to inpatient  -Pain and DVT prophylaxis per Ortho team  -BMP and CBC in a.m.  -Resume home meds  -ID consult, ok for PICC, await surgical cultures  -PT/OT evaluation  -TCC for discharge planning    Thank you for consult  We will continue to follow  Hemodynamically stable      TONY Batista-CNP    Plan of care was discussed extensively with patient.  Patient verbalized understanding through teach back method.  All question and concerns addressed upon examination.    Of note, this documentation is completed using the Dragon Dictation system (voice recognition software). There may be spelling and/or grammatical errors that were not corrected prior to final submission.

## 2024-06-16 NOTE — PROGRESS NOTES
Postop day #2 irrigation debridement left knee with placement of VAC    Patient comfortable.  No fevers chills.    VAC in place.  Dressing clean dry and intact.  Not much coming out of the VAC.    Assessment/plan: Patient doing well postop day 2 irrigation debridement left knee.  Infectious disease to manage antibiotics.  Waiting final culture results.

## 2024-06-16 NOTE — PROGRESS NOTES
Physical Therapy    Physical Therapy Treatment    Patient Name: Lisa Guerrero  MRN: [id number]  Today's Date: 6/16/2024  Time Calculation  Start Time: 1035  Stop Time: 1113  Time Calculation (min): 38 min     814/814-A    Assessment/Plan   End of Session Communication: Bedside nurse  Assessment Comment: Patient presents with fair effort throughout todays session. Willing to participate in all tasks; however, little carry over of cues throughout due to resistance to proper methods and sequencing for functional mobility. Call light and all needs in reach.  End of Session Patient Position: Up in chair, Alarm off, not on at start of session      General Visit Information:   PT  Visit  PT Received On: 06/16/24  General  Prior to Session Communication: Bedside nurse  Patient Position Received: Bed, 2 rail up, Alarm off, not on at start of session  General Comment: Pt agreeable to therapy this date.  Subjective     Precautions:  Precautions  LE Weight Bearing Status: Weight Bearing as Tolerated (LLE)  Medical Precautions: Fall precautions  Post-Surgical Precautions:  (WBAT in knee immobilizer at all times with exception of skin care and hygiene. Ok for gentle ROM 0-60 degrees.)  Braces Applied: KI donned prior to therapist entry and throughout entire session.  Precautions Comment: gentle ROM 0-60 degrees L knee     Objective     Pain:  Pain Assessment  Pain Assessment: 0-10  Pain Score:  (.5/10)  Pain Type: Surgical pain  Pain Location: Knee  Pain Orientation: Left    Cognition:  Cognition  Overall Cognitive Status: Within Functional Limits      Treatments:           Bed Mobility  Bed Mobility: Yes  Bed Mobility 1  Bed Mobility 1: Supine to sitting  Level of Assistance 1: Moderate assistance  Bed Mobility Comments 1: Pt performed supine<>EOB ModA bringing LLE off edge of bed as well as lifting trunk. VC for improved sequencing however pt demonstrates little carry over.  Ambulation/Gait Training  Ambulation/Gait Training  Performed: Yes  Ambulation/Gait Training 1  Surface 1: Level tile  Device 1: Rolling walker (FWW)  Assistance 1: Close supervision  Quality of Gait 1: Antalgic  Comments/Distance (ft) 1: Pt ambulated 80' x2 with FWW, SUP. Pt demonstrates circumduction gait with LLE due to poor foot clearance with inability to flex L knee. Short stride length, antalgic this date with frequent static standing rest breaks due to discomfort in LLE. VC to correct. Fair AD management with VC to correct, poor carry over of cues throughout.  Transfers  Transfer: Yes  Transfer 1  Transfer From 1: Stand to  Transfer to 1: Chair with arms, Commode-standard  Technique 1: Stand pivot  Transfer Device 1: Walker (FWW)  Transfer Level of Assistance 1: Contact guard  Trials/Comments 1: Pt performed stand pivot from FWW<>chair/commode CGA. Pt requires VC for sequencing, good eccentric control to sitting position. Poor AD management throughout despite cues. Unwilling to keep LEs within walker, also pulls up with one UE to walker.  Transfers 2  Transfer From 2: Bed to  Transfer to 2: Stand  Technique 2: Stand to sit, Sit to stand  Transfer Device 2: Walker (FWW)  Transfer Level of Assistance 2: Contact guard  Trials/Comments 2: Pt performed STS from EOB<>FWW CGA. VC required for sequencing, proper foot and hand placement. VC for AD management and placement with little carry over.          Outcome Measures:  [facility] Basic Mobility  Turning from your back to your side while in a flat bed without using bedrails: A little  Moving from lying on your back to sitting on the side of a flat bed without using bedrails: A lot  Moving to and from bed to chair (including a wheelchair): A little  Standing up from a chair using your arms (e.g. wheelchair or bedside chair): A little  To walk in hospital room: A little  Climbing 3-5 steps with railing: A lot  Basic Mobility - Total Score: 16  Education Documentation  Body Mechanics, taught by Faiza Preciado PTA at  6/16/2024 12:36 PM.  Learner: Patient  Readiness: Acceptance  Method: Explanation, Demonstration  Response: Verbalizes Understanding, Needs Reinforcement    Precautions, taught by Faiza Preciado PTA at 6/16/2024 12:36 PM.  Learner: Patient  Readiness: Acceptance  Method: Explanation, Demonstration  Response: Verbalizes Understanding, Needs Reinforcement    ADL Training, taught by Faiza Preciado PTA at 6/16/2024 12:36 PM.  Learner: Patient  Readiness: Acceptance  Method: Explanation, Demonstration  Response: Verbalizes Understanding, Needs Reinforcement    Precautions, taught by Faiza Preciado PTA at 6/16/2024 12:36 PM.  Learner: Patient  Readiness: Acceptance  Method: Explanation, Demonstration  Response: Verbalizes Understanding, Needs Reinforcement    Mobility Training, taught by Faiza Preciado PTA at 6/16/2024 12:36 PM.  Learner: Patient  Readiness: Acceptance  Method: Explanation, Demonstration  Response: Verbalizes Understanding, Needs Reinforcement    Education Comments  No comments found.        EDUCATION:     Encounter Problems       Encounter Problems (Active)       PT Problem       Pt will demonstrate sup > sit and sit > sup bed mobility mod I (Progressing)       Start:  06/15/24    Expected End:  06/29/24            Pt will demo sit > stand and stand > sit transfer with FWW and mod I  (Progressing)       Start:  06/15/24    Expected End:  06/29/24            Pt will ambulate 75' with FWW and mod I, without LOB  (Progressing)       Start:  06/15/24    Expected End:  06/29/24            Pt will demo up/down 1 steps with handrail and SBA (Not Progressing)       Start:  06/15/24    Expected End:  06/29/24            Pt will demo 0-60 PROM L knee (Not Progressing)       Start:  06/15/24    Expected End:  06/29/24                   Pain - Adult

## 2024-06-17 ENCOUNTER — HOME HEALTH ADMISSION (OUTPATIENT)
Dept: HOME HEALTH SERVICES | Facility: HOME HEALTH | Age: 59
End: 2024-06-17
Payer: COMMERCIAL

## 2024-06-17 PROBLEM — T81.31XA DISRUPTION OF EXTERNAL OPERATION (SURGICAL) WOUND, NOT ELSEWHERE CLASSIFIED, INITIAL ENCOUNTER: Status: RESOLVED | Noted: 2024-06-13 | Resolved: 2024-06-17

## 2024-06-17 PROBLEM — Z98.890 STATUS POST INCISION AND DRAINAGE: Status: ACTIVE | Noted: 2024-06-17

## 2024-06-17 LAB — CK SERPL-CCNC: 20 U/L (ref 0–215)

## 2024-06-17 PROCEDURE — 2500000004 HC RX 250 GENERAL PHARMACY W/ HCPCS (ALT 636 FOR OP/ED): Performed by: ORTHOPAEDIC SURGERY

## 2024-06-17 PROCEDURE — 99231 SBSQ HOSP IP/OBS SF/LOW 25: CPT | Performed by: PLASTIC SURGERY

## 2024-06-17 PROCEDURE — 2500000005 HC RX 250 GENERAL PHARMACY W/O HCPCS

## 2024-06-17 PROCEDURE — 96367 TX/PROPH/DG ADDL SEQ IV INF: CPT

## 2024-06-17 PROCEDURE — 1100000001 HC PRIVATE ROOM DAILY

## 2024-06-17 PROCEDURE — 36415 COLL VENOUS BLD VENIPUNCTURE: CPT | Performed by: INTERNAL MEDICINE

## 2024-06-17 PROCEDURE — 82550 ASSAY OF CK (CPK): CPT | Performed by: INTERNAL MEDICINE

## 2024-06-17 PROCEDURE — 2500000004 HC RX 250 GENERAL PHARMACY W/ HCPCS (ALT 636 FOR OP/ED): Performed by: INTERNAL MEDICINE

## 2024-06-17 PROCEDURE — 2500000004 HC RX 250 GENERAL PHARMACY W/ HCPCS (ALT 636 FOR OP/ED): Mod: JZ | Performed by: INTERNAL MEDICINE

## 2024-06-17 PROCEDURE — 2500000001 HC RX 250 WO HCPCS SELF ADMINISTERED DRUGS (ALT 637 FOR MEDICARE OP): Performed by: ORTHOPAEDIC SURGERY

## 2024-06-17 PROCEDURE — 2500000001 HC RX 250 WO HCPCS SELF ADMINISTERED DRUGS (ALT 637 FOR MEDICARE OP)

## 2024-06-17 PROCEDURE — 2500000002 HC RX 250 W HCPCS SELF ADMINISTERED DRUGS (ALT 637 FOR MEDICARE OP, ALT 636 FOR OP/ED)

## 2024-06-17 PROCEDURE — 7100000011 HC EXTENDED STAY RECOVERY HOURLY - NURSING UNIT

## 2024-06-17 PROCEDURE — 96372 THER/PROPH/DIAG INJ SC/IM: CPT | Performed by: ORTHOPAEDIC SURGERY

## 2024-06-17 PROCEDURE — G0378 HOSPITAL OBSERVATION PER HR: HCPCS

## 2024-06-17 PROCEDURE — 96365 THER/PROPH/DIAG IV INF INIT: CPT

## 2024-06-17 PROCEDURE — 99232 SBSQ HOSP IP/OBS MODERATE 35: CPT | Performed by: REGISTERED NURSE

## 2024-06-17 RX ORDER — OXYCODONE HYDROCHLORIDE 5 MG/1
5 TABLET ORAL EVERY 6 HOURS PRN
Qty: 28 TABLET | Refills: 0 | Status: SHIPPED | OUTPATIENT
Start: 2024-06-17 | End: 2024-06-24

## 2024-06-17 RX ORDER — ENOXAPARIN SODIUM 100 MG/ML
40 INJECTION SUBCUTANEOUS DAILY
Start: 2024-06-18 | End: 2024-06-18 | Stop reason: HOSPADM

## 2024-06-17 RX ORDER — DOCUSATE SODIUM 100 MG/1
100 CAPSULE, LIQUID FILLED ORAL 2 TIMES DAILY
Start: 2024-06-17 | End: 2024-06-27

## 2024-06-17 RX ORDER — ERTAPENEM 1 G/1
1 INJECTION, POWDER, LYOPHILIZED, FOR SOLUTION INTRAMUSCULAR; INTRAVENOUS EVERY 24 HOURS
Status: DISCONTINUED | OUTPATIENT
Start: 2024-06-17 | End: 2024-06-18 | Stop reason: HOSPADM

## 2024-06-17 RX ORDER — LIDOCAINE HYDROCHLORIDE 10 MG/ML
5 INJECTION, SOLUTION INFILTRATION; PERINEURAL ONCE
Status: COMPLETED | OUTPATIENT
Start: 2024-06-17 | End: 2024-06-17

## 2024-06-17 RX ADMIN — SACUBITRIL AND VALSARTAN 1 TABLET: 24; 26 TABLET, FILM COATED ORAL at 20:30

## 2024-06-17 RX ADMIN — AZTREONAM 2 G: 2 INJECTION, POWDER, LYOPHILIZED, FOR SOLUTION INTRAMUSCULAR; INTRAVENOUS at 02:13

## 2024-06-17 RX ADMIN — GABAPENTIN 100 MG: 100 CAPSULE ORAL at 20:30

## 2024-06-17 RX ADMIN — SERTRALINE HYDROCHLORIDE 100 MG: 50 TABLET, FILM COATED ORAL at 09:00

## 2024-06-17 RX ADMIN — OXYCODONE HYDROCHLORIDE 10 MG: 5 TABLET ORAL at 15:09

## 2024-06-17 RX ADMIN — SACUBITRIL AND VALSARTAN 1 TABLET: 24; 26 TABLET, FILM COATED ORAL at 08:59

## 2024-06-17 RX ADMIN — AZTREONAM 2 G: 2 INJECTION, POWDER, LYOPHILIZED, FOR SOLUTION INTRAMUSCULAR; INTRAVENOUS at 10:22

## 2024-06-17 RX ADMIN — DOCUSATE SODIUM 100 MG: 100 CAPSULE, LIQUID FILLED ORAL at 08:59

## 2024-06-17 RX ADMIN — ENOXAPARIN SODIUM 40 MG: 40 INJECTION SUBCUTANEOUS at 09:00

## 2024-06-17 RX ADMIN — GABAPENTIN 100 MG: 100 CAPSULE ORAL at 15:09

## 2024-06-17 RX ADMIN — METOPROLOL SUCCINATE 100 MG: 50 TABLET, EXTENDED RELEASE ORAL at 08:59

## 2024-06-17 RX ADMIN — ERTAPENEM SODIUM 1 G: 1 INJECTION, POWDER, LYOPHILIZED, FOR SOLUTION INTRAMUSCULAR; INTRAVENOUS at 15:10

## 2024-06-17 RX ADMIN — LIDOCAINE HYDROCHLORIDE 20 MG: 10 INJECTION, SOLUTION INFILTRATION; PERINEURAL at 10:38

## 2024-06-17 RX ADMIN — GABAPENTIN 100 MG: 100 CAPSULE ORAL at 08:59

## 2024-06-17 ASSESSMENT — COGNITIVE AND FUNCTIONAL STATUS - GENERAL
MOBILITY SCORE: 23
CLIMB 3 TO 5 STEPS WITH RAILING: A LITTLE
CLIMB 3 TO 5 STEPS WITH RAILING: A LITTLE
DAILY ACTIVITIY SCORE: 24
WALKING IN HOSPITAL ROOM: A LITTLE
DAILY ACTIVITIY SCORE: 24
MOBILITY SCORE: 22

## 2024-06-17 ASSESSMENT — PAIN SCALES - GENERAL
PAINLEVEL_OUTOF10: 0 - NO PAIN
PAINLEVEL_OUTOF10: 6
PAINLEVEL_OUTOF10: 0 - NO PAIN

## 2024-06-17 ASSESSMENT — PAIN DESCRIPTION - ORIENTATION: ORIENTATION: LEFT

## 2024-06-17 ASSESSMENT — PAIN DESCRIPTION - LOCATION: LOCATION: LEG

## 2024-06-17 NOTE — PROGRESS NOTES
Infectious Disease Progress Note       6/17/2024    Patient is a followup regarding  Left knee wound dehiscence taken to the OR by orthopedics for I&D of left bursa hematoma, infected.  Patient is status post patellar fracture with open reduction internal fixation.  She developed open wound with dehiscence, thus undergoing I&D      Subjectively, no new complaints at this time.   Feels well overall    Patient is awake and alert  NAD  Neck supple  Heart S1S2  Chest: Equal expansion, bilaterally clear to auscultation  Abdomen: soft, ND, NTTP, no guarding  Extrem: no pain to palpation, left knee brace  Skin: no rashes, no diaphoresis  Neuro: CNS intact  Affect appropriate and patient is interactive      Lab Results   Component Value Date    WBC 8.7 06/15/2024    HGB 12.0 06/15/2024    HCT 38.5 06/15/2024    MCV 91 06/15/2024     06/15/2024     Lab Results   Component Value Date    GLUCOSE 115 (H) 06/15/2024    CALCIUM 8.8 06/15/2024     06/15/2024    K 4.5 06/15/2024    CO2 25 06/15/2024     (H) 06/15/2024    BUN 17 06/15/2024    CREATININE 0.90 06/15/2024       WBC trends are being monitored. Antibiotic doses are being adjusted per most recent renal labs.     Vitals:    06/17/24 0730   BP: 119/56   Pulse: 55   Resp: 18   Temp: 36.8 °C (98.2 °F)   SpO2: 96%           Patient Active Problem List   Diagnosis    Chronic obstructive pulmonary disease (Multi)    Depression    GERD (gastroesophageal reflux disease)    Hypertension    Insomnia    Jugular vein thrombosis    Nicotine dependence, cigarettes, uncomplicated    Nonischemic dilated cardiomyopathy (Multi)    PCOD (polycystic ovarian disease)    Panic attacks    Arthritis of sacroiliac joint of both sides    Lumbar facet arthropathy    Chronic left-sided lumbar radiculopathy    Lumbar disc disease    Factor V deficiency (Multi)    Trochanteric bursitis of left hip    HERNANDO (obstructive sleep apnea)    Arthritis    Abnormal EKG    Arthritis of hip     Chronic kidney disease    Dyspnea on minimal exertion    Dyspnea    Factor V Leiden (Multi)    Fatty liver    Hyperkalemia    Hypokalemia    Iron deficiency anemia    Lactic acidosis    Liver mass    Mitral regurgitation    Nonsustained ventricular tachycardia (Multi)    Osteoarthritis of left hip    Obesity, Class III, BMI 40-49.9 (morbid obesity) (Multi)    Pulmonary edema (HHS-HCC)    Pneumonia    Personal history of DVT (deep vein thrombosis)    History of repair of hip joint    Hypoxia    Acute on chronic overt combined systolic and diastolic congestive heart failure (Multi)    Chronic systolic heart failure (Multi)    Renal infarction (Multi)    Esophageal dysphagia    Hypotension    Venous thrombosis    Class 3 severe obesity in adult (Multi)    Acute left ankle pain    Acute foot pain, left    Disruption of external operation (surgical) wound, not elsewhere classified, initial encounter    Knee pain     Estimated Creatinine Clearance: 80.4 mL/min (by C-G formula based on SCr of 0.9 mg/dL).    Patient is status post I&D knee with wound VAC placement    ASSESSMENT:  Patella fracture status post open reduction internal fixation, with wound dehiscence  Status post a left knee I&D with wound VAC placement  Cultures positive Proteus mirabilis, only resistant to tetracycline  Open wound left knee  Tobacco use  Multiple drug allergies including ceftriaxone penicillins sulfa    PLAN:  Patient is currently on daptomycin and Azactam.  May benefit from test dose to Invanz.  If tolerated, would recommend 6 weeks Invanz 1 g IV daily.  This would be to cover for early prosthetic joint infection.  If tolerated, would discontinue daptomycin and Azactam.  Case discussed with Ortho.  Possible hardware extraction in a month after fracture heals  Will need wound care follow-up and follow-up with infectious disease in 2 to 3 weeks.  Weekly CBC CRP BMP.      Imaging and labs were reviewed per medical records and any ID pertinent  labs were also addressed        Tatyana Burnham, DO

## 2024-06-17 NOTE — DISCHARGE INSTRUCTIONS
Total Knee Replacement  Discharge Instructions    To prevent Clot formation, you have been placed on the following medication:  Resume Xarelto 20 mg daily as you were taking prior to surgery.    Surgical Site Care:  Prevena Wound Vac to stay in place for 7 days   On post operative day 7 turn canister off on prevena and allow bandage to deflate. Then remove like a Band-Aid. And throw everything including the canister in the trash.   Then you may leave surgical incision open to air after prevena is removed  If any drainage, apply a non adherent dry sterile dressing to incision and change daily and PRN    Physical Therapy:  Weight Bearing Status:  Weight-bearing as tolerated in knee immobilizer. Okay for gentle range of motion 0-60 dregrees  Precautions, Per Physical Therapy Handout    Pain Medications  You were given  oxycodone  Wean off pain medications as you deem appropriate as long as pain is under control  Do not exceed 4,000 mg of acetaminophen from all sources in a 24 hour period    Cold packs/Ice packs/Machine  May be used 5 times daily for 15-30 minutes as necessary  Be sure to have a barrier (cloth, clothing, towel) between the site and the ice pack to prevent frostbite    Contact [facility] office if  Increased redness, swelling, drainage of any kind, and/or pain to surgery site.  As well as new onset fevers and or chills.  These could signify an infection.  Calf or thigh tenderness to touch as well as increased swelling or redness.  This could signify a clot formation.  Numbness or tingling to an area around the incision site or below the incision site (toes).  Any rash appears, increased  or new onset nausea/vomiting occur.  This may indicate a reaction to a medication.  Phone # [phone].  Follow up with Surgeon in 2 weeks  I acknowledge that I have received brock hose and understand the instructions on how and when to wear them (on during the day, off at night)   Discharging RN who has  gone over instructions and acknowledges lizy hose have been received     Ice 5 times a day for 20 minutes each session to operative extremity for two weeks.   LIZY hose to be worn for three weeks. Can be removed for skin care and hygiene.   Incentive spirometer 10 times every hour while awake for two weeks.         Patient has a follow up wound care appointment which has already been scheduled at [facility] Located in [facility] [address]. Appointment has been made for Thursday June 20th at 1 pm. With Dr. Reyes. Please arrive 30 minutes early for registration. If you can not make this appointment please call [phone] to reschedule.

## 2024-06-17 NOTE — PROGRESS NOTES
Plan is for [facility] when MD orders PICC and medications.. Message to TCC supervisor to follow with [facility], Patient will also be going home with need for RN for wound care follow up. Anticipating going home with wound vac as well as IV medications needs. Patient states no need for DME produces. She is weight bearing and ambulates with walker and crutches if needed. Family assists.

## 2024-06-17 NOTE — PROGRESS NOTES
Physical Therapy                 Therapy Communication Note    Patient Name: Lisa Guerrero  MRN: [id number]  Today's Date: 6/17/2024     Discipline: Physical Therapy    Missed Visit Reason: Missed Visit Reason: Patient refused (Attempted at 939AM pt refused stating she is awaiting PICC to be placed and then DC. Stated she had recently gone for a walk around the floor.)    Missed Time: Attempt

## 2024-06-17 NOTE — PROGRESS NOTES
Lisa Guerrero is a 59 y.o. female on day 0 of admission presenting with Disruption of external operation (surgical) wound, not elsewhere classified, initial encounter.      Subjective   Patient examined and seen. Alert and oriented x3, resting comfortably.  Patient denies chest pain, shortness of breath, palpitations, abdominal pain, fever or chills.  Patient is agreeable to go home when cleared.  Reports support system is intact.    She is pending PICC line placement.          Objective     Last Recorded Vitals  /56 (BP Location: Right arm, Patient Position: Lying)   Pulse 55   Temp 36.8 °C (98.2 °F) (Temporal)   Resp 18   Wt 112 kg (246 lb 14.6 oz)   SpO2 96%   Intake/Output last 3 Shifts:    Intake/Output Summary (Last 24 hours) at 6/17/2024 1250  Last data filed at 6/17/2024 0900  Gross per 24 hour   Intake 610 ml   Output 5600 ml   Net -4990 ml       Admission Weight  Weight: 108 kg (238 lb) (06/13/24 1504)    Daily Weight  06/14/24 : 112 kg (246 lb 14.6 oz)    Image Results  EKG 12 lead  Normal sinus rhythm  Nonspecific T wave abnormality  Abnormal ECG  When compared with ECG of 24-JUN-2023 07:23,  Premature ventricular complexes are no longer Present  QRS axis Shifted left  QRS voltage has increased  Nonspecific T wave abnormality, improved in Anterolateral leads  QT has shortened  Confirmed by Matias Bradford ([id number]) on 6/14/2024 7:21:28 PM      Physical Exam  Constitutional: Well developed, awake/alert/oriented x3, cooperative  Respiratory/Thorax: Patent airways,  normal breath sounds   Cardiovascular: Regular, rate and rhythm,  normal S 1and S 2  Musculoskeletal: mild decrease range of motion to left knee s/p sx intervention, splint and wound vac in place   Extremities: moves all extremities   Skin: warm, dry, intact except as noted   Neurological: alert/oriented x 3, speech clear      Relevant Results  Scheduled medications  aztreonam, 2 g, intravenous, q8h  bumetanide, 2 mg, oral,  BID  daptomycin, 500 mg, intravenous, Daily  docusate sodium, 100 mg, oral, BID  enoxaparin, 40 mg, subcutaneous, Daily  gabapentin, 100 mg, oral, TID  lidocaine, 5 mL, infiltration, Once  metoprolol succinate XL, 100 mg, oral, Daily  midazolam, 1 mg, intravenous, Once  nicotine, 1 patch, transdermal, Daily  sacubitriL-valsartan, 1 tablet, oral, BID  sertraline, 100 mg, oral, Daily      Continuous medications  oxygen, 2 L/min, Last Rate: 3 L/min (06/14/24 1615)      PRN medications  PRN medications: alteplase, benzocaine-menthol, bisacodyl, cyclobenzaprine, diphenhydrAMINE, HYDROmorphone, morphine, naloxone, ondansetron **OR** ondansetron, oxyCODONE, oxyCODONE, prochlorperazine **OR** prochlorperazine **OR** prochlorperazine    Results for orders placed or performed during the hospital encounter of 06/14/24 (from the past 24 hour(s))   Creatine Kinase   Result Value Ref Range    Creatine Kinase 20 0 - 215 U/L          Assessment/Plan      #Left knee pain with wound dehiscence  #Acute on chronic diastolic CHF  #Anxiety  #Arthritis  #Depression  #Factor V Leiden  #Gout  #Hepatomegaly     -Ortho primary team  -Admit to inpatient  - Hospitalist team consulted for Anxiety and HTN  -Pain and DVT prophylaxis per Ortho team  -BMP and CBC reviewed  -Infectious Disease - ABX management   -Resume home meds as appropriate   -ID consult, ok for PICC, await surgical cultures - Cultures positive for Proteus mirabilis   - Seen by Plastics Team -  Wound Clinic at discharge   -PT/OT evaluation  -TCC for discharge planning     Thank you for consult  MEDICINE TO SIGN OF   CALL FOR ACUTE NEEDS  Hemodynamically stable     Time spent 36  minutes obtaining labs, imaging, recommendations, interview, assessment, examination, medication review/ordering, and EMR review.    Plan of care was discussed extensively with patient, RN. Patient verbalized understanding through teach back method. All questions and concerns addressed upon examination.      Of note, this documentation is completed using the Dragon Dictation system (voice recognition software). There may be spelling and/or grammatical errors that were not corrected prior to final submission.    TONY Blandon-CNP

## 2024-06-17 NOTE — ADDENDUM NOTE
Addendum  created 06/17/24 1125 by Dilip Teresa, APRN-CRNA    Intraprocedure Meds edited, Orders acknowledged in Narrator

## 2024-06-17 NOTE — NURSING NOTE
Reviewed order for picc line, will place once the final antibiotics are determined per ID. On hold for now.

## 2024-06-17 NOTE — PROGRESS NOTES
Chart reviewed, noted patient will need IV antibiotic and wound care on discharge. Order placed for PICC line. ID changed antibiotic to Invanz daily 1 gm. Writer sent to  Pharmacy and intake to verify benefits. Care Transition team to follow and assist as needed. JOSUE Baig

## 2024-06-17 NOTE — PROGRESS NOTES
Plastic Surgery Note    Afebrile, vital signs stable  Feels comfortable  Left knee immobilizer in place with wound VAC  Cultures positive for Proteus mirabilis  Impression: Open wound left knee  Continue wound VAC  If patient to be discharged, can follow-up with me at [facility] wound clinic this Thursday

## 2024-06-17 NOTE — PROGRESS NOTES
Progress Note    Subjective:     Post-Operative Day: 3   Status Post left knee irrigation and debridement with wound vac placement    Systemic or Specific Complaints: No Complaints     Objective:     Visit Vitals  /56 (BP Location: Right arm, Patient Position: Lying)   Pulse 55   Temp 36.8 °C (98.2 °F) (Temporal)   Resp 18       General: alert and oriented, in no acute distress, appears stated age, and cooperative   Wound: no erythema, no edema, and wound vac in place and working appropriately.    Motion: Painful range of Motion   DVT Exam: No evidence of DVT seen on physical exam.  Negative Lorri's sign.  No significant calf/ankle edema.       Knee swollen but thigh soft to palpation. Moving foot and ankle. Good distal pulses.      Data Review  Recent Results (from the past 24 hour(s))   Creatine Kinase    Collection Time: 06/17/24  3:59 AM   Result Value Ref Range    Creatine Kinase 20 0 - 215 U/L         Assessment:     Status Post left knee irrigation and debridement. Doing well postoperatively. No acute events overnight.    Acute postoperative pain: Reports mild to moderate pain to operative extremity.  Exacerbated by movement, relieved by rest ice and pain medication.  Continue current pain management.    Tissue/wound culture positive for rare proteus mirabilis. ID following for final antibiotic recommendations. Okay for PICC today per ID, order placed.       Plan:      1: Continues current post-op course   2:  Continue Deep venous thrombosis prophylaxis: Lovenox 40 mg subcutaneous daily for 28 days  3.  Continue IV antibiotics per ID, PICC today.   3:  Continue physical therapy: WBAT in knee immobilizer at all times with exception of skin care and hygiene. Ok for gentle ROM 0-60 degrees.   4:  Continue Pain Control, script in chart  5.   Discharge planning: Discharge disposition TBD. SW and TCC following for discharge planning.       Shakila Ernst, APRN-CNP

## 2024-06-18 ENCOUNTER — APPOINTMENT (OUTPATIENT)
Dept: RADIOLOGY | Facility: HOSPITAL | Age: 59
DRG: 920 | End: 2024-06-18
Payer: COMMERCIAL

## 2024-06-18 ENCOUNTER — HOME INFUSION (OUTPATIENT)
Dept: INFUSION THERAPY | Age: 59
End: 2024-06-18
Payer: COMMERCIAL

## 2024-06-18 ENCOUNTER — APPOINTMENT (OUTPATIENT)
Dept: WOUND CARE | Facility: CLINIC | Age: 59
End: 2024-06-18
Payer: COMMERCIAL

## 2024-06-18 ENCOUNTER — DOCUMENTATION (OUTPATIENT)
Dept: HOME HEALTH SERVICES | Facility: HOME HEALTH | Age: 59
End: 2024-06-18
Payer: COMMERCIAL

## 2024-06-18 VITALS
WEIGHT: 246.91 LBS | DIASTOLIC BLOOD PRESSURE: 59 MMHG | TEMPERATURE: 98.1 F | HEART RATE: 64 BPM | OXYGEN SATURATION: 92 % | RESPIRATION RATE: 22 BRPM | SYSTOLIC BLOOD PRESSURE: 118 MMHG | HEIGHT: 64 IN | BODY MASS INDEX: 42.15 KG/M2

## 2024-06-18 PROCEDURE — 2500000001 HC RX 250 WO HCPCS SELF ADMINISTERED DRUGS (ALT 637 FOR MEDICARE OP)

## 2024-06-18 PROCEDURE — 2500000004 HC RX 250 GENERAL PHARMACY W/ HCPCS (ALT 636 FOR OP/ED): Performed by: ORTHOPAEDIC SURGERY

## 2024-06-18 PROCEDURE — G0378 HOSPITAL OBSERVATION PER HR: HCPCS

## 2024-06-18 PROCEDURE — C1751 CATH, INF, PER/CENT/MIDLINE: HCPCS

## 2024-06-18 PROCEDURE — 2500000004 HC RX 250 GENERAL PHARMACY W/ HCPCS (ALT 636 FOR OP/ED): Performed by: INTERNAL MEDICINE

## 2024-06-18 PROCEDURE — 2500000002 HC RX 250 W HCPCS SELF ADMINISTERED DRUGS (ALT 637 FOR MEDICARE OP, ALT 636 FOR OP/ED)

## 2024-06-18 PROCEDURE — 02HV33Z INSERTION OF INFUSION DEVICE INTO SUPERIOR VENA CAVA, PERCUTANEOUS APPROACH: ICD-10-PCS

## 2024-06-18 PROCEDURE — 36569 INSJ PICC 5 YR+ W/O IMAGING: CPT

## 2024-06-18 PROCEDURE — 2720000007 HC OR 272 NO HCPCS

## 2024-06-18 PROCEDURE — 99231 SBSQ HOSP IP/OBS SF/LOW 25: CPT | Performed by: PLASTIC SURGERY

## 2024-06-18 PROCEDURE — 97535 SELF CARE MNGMENT TRAINING: CPT | Mod: GO,CO

## 2024-06-18 PROCEDURE — 2500000001 HC RX 250 WO HCPCS SELF ADMINISTERED DRUGS (ALT 637 FOR MEDICARE OP): Performed by: ORTHOPAEDIC SURGERY

## 2024-06-18 PROCEDURE — 96366 THER/PROPH/DIAG IV INF ADDON: CPT

## 2024-06-18 RX ORDER — ERTAPENEM 1 G/1
1 INJECTION, POWDER, LYOPHILIZED, FOR SOLUTION INTRAMUSCULAR; INTRAVENOUS EVERY 24 HOURS
Qty: 2000 ML | Refills: 0 | Status: SHIPPED | OUTPATIENT
Start: 2024-06-18 | End: 2024-07-10

## 2024-06-18 RX ADMIN — OXYCODONE HYDROCHLORIDE 10 MG: 5 TABLET ORAL at 00:58

## 2024-06-18 RX ADMIN — METOPROLOL SUCCINATE 100 MG: 50 TABLET, EXTENDED RELEASE ORAL at 09:21

## 2024-06-18 RX ADMIN — ERTAPENEM SODIUM 1 G: 1 INJECTION, POWDER, LYOPHILIZED, FOR SOLUTION INTRAMUSCULAR; INTRAVENOUS at 13:36

## 2024-06-18 RX ADMIN — OXYCODONE HYDROCHLORIDE 10 MG: 5 TABLET ORAL at 09:23

## 2024-06-18 RX ADMIN — SACUBITRIL AND VALSARTAN 1 TABLET: 24; 26 TABLET, FILM COATED ORAL at 09:21

## 2024-06-18 RX ADMIN — ENOXAPARIN SODIUM 40 MG: 40 INJECTION SUBCUTANEOUS at 09:21

## 2024-06-18 RX ADMIN — GABAPENTIN 100 MG: 100 CAPSULE ORAL at 09:21

## 2024-06-18 RX ADMIN — SERTRALINE HYDROCHLORIDE 100 MG: 50 TABLET, FILM COATED ORAL at 09:21

## 2024-06-18 ASSESSMENT — PAIN DESCRIPTION - LOCATION: LOCATION: FOOT

## 2024-06-18 ASSESSMENT — COGNITIVE AND FUNCTIONAL STATUS - GENERAL
DRESSING REGULAR LOWER BODY CLOTHING: A LITTLE
DAILY ACTIVITIY SCORE: 20
TOILETING: A LITTLE
TOILETING: A LITTLE
MOBILITY SCORE: 22
DRESSING REGULAR UPPER BODY CLOTHING: A LITTLE
WALKING IN HOSPITAL ROOM: A LITTLE
CLIMB 3 TO 5 STEPS WITH RAILING: A LITTLE
HELP NEEDED FOR BATHING: A LITTLE
DRESSING REGULAR LOWER BODY CLOTHING: A LITTLE
DAILY ACTIVITIY SCORE: 20
HELP NEEDED FOR BATHING: A LITTLE
DRESSING REGULAR UPPER BODY CLOTHING: A LITTLE

## 2024-06-18 ASSESSMENT — PAIN SCALES - GENERAL
PAINLEVEL_OUTOF10: 7
PAINLEVEL_OUTOF10: 6
PAINLEVEL_OUTOF10: 7

## 2024-06-18 ASSESSMENT — PAIN DESCRIPTION - ORIENTATION
ORIENTATION: LEFT
ORIENTATION: LEFT

## 2024-06-18 ASSESSMENT — ACTIVITIES OF DAILY LIVING (ADL): HOME_MANAGEMENT_TIME_ENTRY: 14

## 2024-06-18 ASSESSMENT — PAIN - FUNCTIONAL ASSESSMENT: PAIN_FUNCTIONAL_ASSESSMENT: 0-10

## 2024-06-18 NOTE — PROGRESS NOTES
Plastic Surgery Note    Afebrile, vital signs stable  Knee immobilizer in place in left knee  For plan PICC line today  Impression: Open wound left knee  Okay for discharge from my standpoint with same dressings  Follow-up with me at the [facility] wound clinic this week

## 2024-06-18 NOTE — DISCHARGE SUMMARY
Discharge summary  This patient Lisa Guerrero was admitted to the hospital on 6/14/2024  after undergoing Procedure(s) (LRB):  Irrigation and Debridement Knee with Wound Vac Placement (Left) without complications that morning.    During the postoperative period,while in hospital, patient was medically managed by the hospitalist. Please see medial notes and H&P for patients additional diagnoses.  Ortho agrees with all medical diagnoses and treatments while patient in hospital.  No significant or unexpected findings or abnormal ortho imaging were noted during the hospital stay    Hospital course      Patient tolerated surgical procedure well and there was no complications. Patient progressed adequately through their recovery during hospital stay including PT and rehabilitation.    Patient was then D/C on  to home  in stable condition.  Patient was instructed on the use of pain medications, the signs and symptoms of infection, and was given our number to call should they have any questions or concerns following discharge.    Based on my clinical judgment, the patient was provided with a 7-day prescription for opioid medication at 30 MED, indicated for treatment of acute pain in the setting of recent surgery. OARRS report was run and has demonstrated an appropriate time course.  The patient has been provided with counseling pertaining to safe use of opioid medication.      Patient may WBAT to operative extremity with use of walker for assistance with ambulation  Immobilizer to knee. Okay for gentle ROM 0-60 degrees   Prevena to be removed POD#7. Follow up with Dr. Reyes this week  Okay to resume Xarelto 20 mg daily as you were taking prior to surgery for DVT prophylaxis starting on POD4 . Lovenox 40 mg daily was given while hospitalized.  IV Invanz 1 g daily for 6 weeks.   Follow up with surgeon in 2 weeks

## 2024-06-18 NOTE — HH CARE COORDINATION
Home Care received a Referral for Infusion, Nursing, Physical Therapy, and Occupational Therapy. We have processed the referral for a Start of Care on 6/19/24 in the afternoon or per team..     If you have any questions or concerns, please feel free to contact us at [phone]. Follow the prompts, enter your five digit zip code, and you will be directed to your care team on WEST 2.

## 2024-06-18 NOTE — PROGRESS NOTES
06/18/24 1657   Current Planned Discharge Disposition   Current Planned Discharge Disposition Home H     Patient discharged to home with [facility] for IV infusion. Confirmed SOC for tomorrow. Writer followed up with patient around 1300. She was ready for discharge and has spoken with  Pharmacy agreeable to cost. Patient went home with wet to dry saline dressing change twice a day until wound vac can be approve. [place] wound care nurse provided education on dressing change. All [facility] order finalized. Patient medically clear fo discharge. JOSUE Baig

## 2024-06-18 NOTE — PROGRESS NOTES
Physical Therapy                 Therapy Communication Note    Patient Name: Lisa Guerrero  MRN: [id number]  Today's Date: 6/18/2024     Discipline: Physical Therapy    Missed Visit Reason: Missed Visit Reason:  (Pt with transport going for PICC line placement. Will reattempt as able.)    Missed Time: Attempt

## 2024-06-18 NOTE — DOCUMENTATION CLARIFICATION NOTE
"    PATIENT:               ZACK GIBSON  ACCT #:                  [id number]  MRN:                       [id number]  :                       1965  ADMIT DATE:       2024 10:44 AM  DISCH DATE:  RESPONDING PROVIDER #:        [id number]          PROVIDER RESPONSE TEXT:    acute diastoli chf ruled out, patient only with chronic diastolic chf    CDI QUERY TEXT:    Clarification    Instruction:    Based on your assessment of the patient and the clinical information, please provide the requested documentation by clicking on the appropriate radio button and enter any additional information if prompted.    Question: Please clarify the diagnosis of Congestive Heart Failure    When answering this query, please exercise your independent professional judgment. The fact that a question is being asked, does not imply that any particular answer is desired or expected.    The patient's clinical indicators include:  Clinical Information: Pt presents electively for I/D d/t wound disruption s/p knee surgery    Documented Diagnosis: acute on chronic diastolic chf    Clinical Indicators and Documentation:  -Vital Signs:  36.5, 67, 18, 128/65, pox 96 percent on ra  -ECHO: 23  \"Left ventricular systolic function is mildly decreased, with an estimated ejection fraction of 40-45 percent. There is global hypokinesis of the left ventricle with minor regional variations. The left ventricular cavity size is moderately dilated. Spectral Doppler shows a normal pattern of left ventricular diastolic filling.\"  -CXR: none this admission  -BNP: n/a  -Physical exam findings:   per the medicine dpn dated 24  -Lung Sounds:  patent airways, normal breath sounds  -JVD: not documented  -Peripheral Edema: not documented  -Supplemental Oxygen: 2L nc post procedure then weaned to ra  -Cardiac Consult n/a    Per the medicine dpn dated 24 - \"Acute on chronic diastolic CHF\"    Treatment: resumed home meds of bumetanide    Risk Factors: " hepatomegaly  Options provided:  -- acute diastolic chf ruled out, patient only with chronic diastolic chf  -- Other - I will add my own diagnosis  -- Refer to Clinical Documentation Reviewer    Query created by: Erick De Leon on 6/18/2024 7:21 AM      Electronically signed by:  RANDY HERRMANN 6/18/2024 10:50 AM

## 2024-06-18 NOTE — PROGRESS NOTES
06/18/24  ASSESSMENT DATE    REVIEWED PT INFO AS CORRECT.   DX... INFECTED KNEE PROSTHETIC JOINT  REVIEWED ALLERGIES…   Allergies   Allergen Reactions    Ceftriaxone Hives    Codeine Hives    Penicillins Hives     Tolerates invanz 6/18/2024    Percocet [Oxycodone-Acetaminophen] GI Upset and Nausea/vomiting    Sacubitril-Valsartan Headache and Dizziness     Nightmares, dry mouth    Sulfamethoxazole-Trimethoprim Hives    Hydrogen Peroxide Rash     Burning/swelling    Latex Rash     Blisters         PMH:  has a past medical history of Acute on chronic diastolic heart failure with preserved ejection fraction (Multi) (04/26/2023), Anxiety, Arthritis, CHF (congestive heart failure) (Multi), Depression, Factor V Leiden (Multi), Fractures, Gout, Hepatomegaly, not elsewhere classified, History of blood clots, PONV (postoperative nausea and vomiting), Psoriasis, Skin disorder, Sleep apnea, Snores, Unsteady gait, Uses crutches, and Wears glasses.    TOB:  reports that she has been smoking cigarettes. She has never used smokeless tobacco.    WEIGHT….  112 KG HEIGHT…. 162.6 CM       NO MEDICATION INTERACTIONS.  REVIEWED RELEVANT BASELINE VALUES  LABS FOR HOME INFUSION ARE …….. CBC, BMP, CRP WEEKLY  PT HAS PICC 1L LINE PLACED 06/18 WITH 45CM LENGTH, FLUSH PER [facility] PROTOCOL.  CONTINUE MED THRU TENTATIVE STOP DATE ….09/10/24  MD FOLLOWING….DR MARINA DALAL PLACED IN MB+ GRAVITY BAG  CARE PLAN DONE TODAY      Lisa Guerrero   IS A  59 y.o. female  THAT IS BEING DISCHARGED FROM THE HOSPITAL WITH A DIAGNOSIS OF KNEE PJI…ALLERGIES AND PMH LISTED ABOVE ...PATIENT IS ORDERED ..START OF CARE IS 06/19 AND CONTINUES THROUGH 09/10/24 ....ORDERS REVIEWED AND ARE APPROPRIATE FOR PATIENT AND INDICATION...WEEKLY LABS ORDERED AND DR GRAY IS FOLLOWING    MEDICATION PROFILE REVIEWED AND APPROPRIATE    MD APPOINTMENT IS SCHEDULED FOR 09/10/24    CONFIRMED ADDRESS AND DELIVERY TIME WITH PATIENT … WILL DELIVER BY 9PM WITH  TO CALL BEFORE ARRIVAL  (DOGS)     PROCESSED FILL OF 10 MB+ ERTAPENEM FOR 06/18/24 MIX AND DELIVERY BY 9PM ...FILL IS A 10 DAY SUPPLY AND COVERS 06/19 THROUGH 06/28/24      FOLLOW UP 06/27/24 WITH PATIENT PROGRESS AND LABS...REFILL ERTA FOR OVN DELIVERY

## 2024-06-18 NOTE — NURSING NOTE
I spoke with MONICA Colon and DINO Nguyen regarding PICC line and needing confirmation of final IV antibiotic needed and length of treatment prior to placing the line.  We had sent a message to Dr. rFanks yesterday and haven't heard a response to confirm.   Per Dr Burnham, it pt tolerated Invanz she would recommend 6 weeks Invanz 1 gram daily. Waiting for confirmation.

## 2024-06-18 NOTE — PROCEDURES
Pre-Procedure Checklist:  Emergent Line Insertion: No  Type of Line to be Placed: PICC  Consent Obtained: Yes  Emergency Medication Necessary: No  Patient Identified with 2 Independent Identifiers: Yes  Review of Allergies, Anticoagulation, Relevant Labs, ECG/Telemetry: Yes  Risks/Benefits/Alternatives Discussed with Patient/POA/Legal Representative: Yes  Stop Sign on Door: Yes  Time Out Performed: Yes  Catheter Exchange: No    Positioning Checklist:  All People, Including Patient, in the Room with Cap and Mask: Yes   Fluoroscopy Used to Identify Vessel and Guide Insertion: Yes   Sterile Cover Used: Yes   Full Barrier Precautions Followed (Mask, Cap, Gown, Gloves): Yes   Hands Washed: Yes   Monitors Attached with Sound Alarms On: Yes  Full Body Sterile Drape (Head-to-Toe) Used to Cover Patient: Yes   Trendelenburg Position (For IJ and Subclavian): No   CHG Skin Prep Used and Allowed to Air Dry to Skin Procedure: Yes     Procedure Checklist:  Blood Aspirated From All Lumens, All Ports Subsequently Flushed: Yes   Catheter Caps Placed on All Lumens; Lumens Clamped: Yes   Maintain Guidewire Control Throughout, Ensuring Guidewire Removal: Yes   Maintain Sterile Field Throughout Insertion: Yes   Catheter Secured: Yes   Confirmatory Test of Venous Placement: Non-Pulsatile Blood     Post Procedure Checklist:  Date and Time Written on Dressing: Yes   Sharp and Wire Count and Safe Disposal of all Sharps/Wires: Yes   Sterile Dressing Applied Per Protocol: Yes   X-ray Ordered or ECG Image: Yes     PICC Insertion Details:  See LDA for further details  PICC line expires in 1 year from 6/18/24  Additional Details: Line was inserted using Modified Seldinger's Technique.   Placed by: Katie Brandon RN  PICC ready for immediate use.

## 2024-06-18 NOTE — PROGRESS NOTES
Occupational Therapy    OT Treatment    Patient Name: Lisa Guerrero  MRN: [id number]  Today's Date: 6/18/2024  Time Calculation  Start Time: 0805  Stop Time: 0819  Time Calculation (min): 14 min         Assessment:  End of Session Communication: Bedside nurse  End of Session Patient Position: Up in chair, Alarm off, not on at start of session         Subjective   Previous Visit Info:  OT Last Visit  OT Received On: 06/18/24  General:  General  Prior to Session Communication: Bedside nurse  Patient Position Received: Bed, 2 rail up, Alarm off, not on at start of session  General Comment: pt agreeable to OT tx  Precautions:  LE Weight Bearing Status: Weight Bearing as Tolerated (WBAT LLE with KI on AAT.)  Precautions Comment: wound vac, ok for gentle ROM 0-60 degrees  Vital Signs:     Pain:  Pain Assessment  Pain Assessment: 0-10  Pain Score: 7 (unrated pain in L toes, pt stating she thinks she is having a gout flair up, RN notified)    Objective    Cognition:  Cognition  Overall Cognitive Status: Within Functional Limits  Coordination:     Activities of Daily Living: LE Dressing  LE Dressing:  (pt seated EOB required mod A for LB dressing tasks. pt able to don R sock with increased time however required assist for donning L sock)  Functional Standing Tolerance:  Functional Standing Tolerance Comments: pt demos F - supported standing balance throughout functional activity  Bed Mobility/Transfers: Transfers  Transfer:  (pt performing various functional transfers with use of FWW and required SBA for steadying. no LOB noted. pt educated on safe hand placement when performing with fair carryover noted.)      Outcome Measures:[facility] Daily Activity  Putting on and taking off regular lower body clothing: A little  Bathing (including washing, rinsing, drying): A little  Putting on and taking off regular upper body clothing: A little  Toileting, which includes using toilet, bedpan or urinal: A little  Taking care of personal  grooming such as brushing teeth: None  Eating Meals: None  Daily Activity - Total Score: 20        Education Documentation  Body Mechanics, taught by STACEY Iglesias at 6/18/2024  8:30 AM.  Learner: Patient  Readiness: Acceptance  Method: Explanation  Response: Needs Reinforcement  Comment: pt educated on OT POC and EC techs    Precautions, taught by STACEY Iglesias at 6/18/2024  8:30 AM.  Learner: Patient  Readiness: Acceptance  Method: Explanation  Response: Needs Reinforcement  Comment: pt educated on OT POC and EC techs    ADL Training, taught by STACEY Iglesias at 6/18/2024  8:30 AM.  Learner: Patient  Readiness: Acceptance  Method: Explanation  Response: Needs Reinforcement  Comment: pt educated on OT POC and EC techs    Education Comments  No comments found.        OP EDUCATION:       Goals:  Encounter Problems       Encounter Problems (Active)       OT Goals       Mod I ADL functional mobility with FWW.  (Progressing)       Start:  06/15/24    Expected End:  06/29/24            Mod I sit/stand, bed/chair/commode transfers with FWW.  (Progressing)       Start:  06/15/24    Expected End:  06/29/24            Mod I bed mobility.  (Progressing)       Start:  06/15/24    Expected End:  06/29/24            Mod I LB dressing with AE as needed.  (Progressing)       Start:  06/15/24    Expected End:  06/29/24            Mod I LB bathing with AE as needed.  (Progressing)       Start:  06/15/24    Expected End:  06/29/24

## 2024-06-18 NOTE — PROGRESS NOTES
Progress Note    Subjective:     Post-Operative Day: 4   Status Post left knee irrigation and debridement with wound vac placement    Systemic or Specific Complaints: No Complaints     Objective:     Visit Vitals  /57 (BP Location: Right arm, Patient Position: Sitting)   Pulse 66   Temp 36.4 °C (97.5 °F) (Temporal)   Resp 22       General: alert and oriented, in no acute distress, appears stated age, and cooperative   Wound: no erythema, no edema, and wound vac in place and working appropriately.    Motion: Painful range of Motion   DVT Exam: No evidence of DVT seen on physical exam.  Negative Lorri's sign.  No significant calf/ankle edema.       Knee swollen but thigh soft to palpation. Moving foot and ankle. Good distal pulses.      Data Review  No results found for this or any previous visit (from the past 24 hour(s)).        Assessment:     Status Post left knee irrigation and debridement. Doing well postoperatively. No acute events overnight.    Acute postoperative pain: Reports mild to moderate pain to operative extremity.  Exacerbated by movement, relieved by rest ice and pain medication.  Continue current pain management.    Tissue/wound culture positive for rare proteus mirabilis. IV Invanz 1 g daily for 6 weeks per ID recommendations. PICC today.     Plan:      1. Continues current post-op course   2.  Continue Deep venous thrombosis prophylaxis: Lovenox 40 mg subcutaneous daily.  3.  Continue IV antibiotics, PICC today.   4.  Continue physical therapy: WBAT in knee immobilizer at all times with exception of skin care and hygiene. Ok for gentle ROM 0-60 degrees.   5.  Continue Pain Control, script in chart  6.   Discharge planning: Home with homecare, orders placed. SW and TCC following for discharge planning. Okay to discharge today. Follow up in office in 2 weeks.       Shakila Ernst, APRN-CNP

## 2024-06-19 ENCOUNTER — HOME CARE VISIT (OUTPATIENT)
Dept: HOME HEALTH SERVICES | Facility: HOME HEALTH | Age: 59
End: 2024-06-19
Payer: COMMERCIAL

## 2024-06-19 VITALS
RESPIRATION RATE: 18 BRPM | SYSTOLIC BLOOD PRESSURE: 122 MMHG | HEART RATE: 70 BPM | OXYGEN SATURATION: 98 % | DIASTOLIC BLOOD PRESSURE: 62 MMHG | TEMPERATURE: 98.2 F

## 2024-06-19 PROCEDURE — 99601 HOME NFS VISIT <2 HRS: CPT

## 2024-06-19 PROCEDURE — G0299 HHS/HOSPICE OF RN EA 15 MIN: HCPCS

## 2024-06-19 PROCEDURE — G0152 HHCP-SERV OF OT,EA 15 MIN: HCPCS | Performed by: OCCUPATIONAL THERAPIST

## 2024-06-19 ASSESSMENT — ACTIVITIES OF DAILY LIVING (ADL)
TOILETING: 1
PHYSICAL TRANSFERS ASSESSED: 1
CURRENT_FUNCTION: INDEPENDENT
AMBULATION ASSISTANCE: SUPERVISION
DRESSING_LB_CURRENT_FUNCTION: INDEPENDENT
DRESSING_UB_CURRENT_FUNCTION: INDEPENDENT
AMBULATION ASSISTANCE: 1
BATHING ASSESSED: 1
BATHING_CURRENT_FUNCTION: INDEPENDENT
TOILETING: INDEPENDENT

## 2024-06-19 ASSESSMENT — ENCOUNTER SYMPTOMS
MUSCLE WEAKNESS: 1
CHANGE IN APPETITE: UNCHANGED
LAST BOWEL MOVEMENT: 67010
APPETITE LEVEL: GOOD
BOWEL PATTERN NORMAL: 1
DENIES PAIN: 1

## 2024-06-20 ENCOUNTER — HOME CARE VISIT (OUTPATIENT)
Dept: HOME HEALTH SERVICES | Facility: HOME HEALTH | Age: 59
End: 2024-06-20
Payer: COMMERCIAL

## 2024-06-20 ENCOUNTER — PATIENT OUTREACH (OUTPATIENT)
Dept: PRIMARY CARE | Facility: CLINIC | Age: 59
End: 2024-06-20
Payer: COMMERCIAL

## 2024-06-20 ENCOUNTER — OFFICE VISIT (OUTPATIENT)
Dept: WOUND CARE | Facility: CLINIC | Age: 59
End: 2024-06-20
Payer: COMMERCIAL

## 2024-06-20 VITALS
SYSTOLIC BLOOD PRESSURE: 122 MMHG | TEMPERATURE: 98 F | HEART RATE: 68 BPM | DIASTOLIC BLOOD PRESSURE: 68 MMHG | OXYGEN SATURATION: 97 % | RESPIRATION RATE: 18 BRPM

## 2024-06-20 PROCEDURE — 11042 DBRDMT SUBQ TIS 1ST 20SQCM/<: CPT

## 2024-06-20 PROCEDURE — 11043 DBRDMT MUSC&/FSCA 1ST 20/<: CPT | Performed by: PLASTIC SURGERY

## 2024-06-20 PROCEDURE — 11043 DBRDMT MUSC&/FSCA 1ST 20/<: CPT

## 2024-06-20 PROCEDURE — G0299 HHS/HOSPICE OF RN EA 15 MIN: HCPCS

## 2024-06-20 PROCEDURE — 99213 OFFICE O/P EST LOW 20 MIN: CPT | Mod: 25

## 2024-06-20 PROCEDURE — 11042 DBRDMT SUBQ TIS 1ST 20SQCM/<: CPT | Performed by: PLASTIC SURGERY

## 2024-06-20 PROCEDURE — 99601 HOME NFS VISIT <2 HRS: CPT

## 2024-06-20 PROCEDURE — 3008F BODY MASS INDEX DOCD: CPT | Performed by: PLASTIC SURGERY

## 2024-06-20 PROCEDURE — 99213 OFFICE O/P EST LOW 20 MIN: CPT | Performed by: PLASTIC SURGERY

## 2024-06-20 NOTE — PROGRESS NOTES
Discharge Facility: [facility]  Discharge Diagnosis: Procedure(s) (LRB):  Irrigation and Debridement Knee with Wound Vac Placement (Left) without complications that morning.  Admission Date: 6/14/2024  Discharge Date: 6/18/2024    PCP Appointment Date: none  Specialist Appointment Date:    -wound clinic 6/20/2024  -ortho 6/25/2024  -Follow up with Tatyana Burnham in 2 week(s)     Hospital Encounter and Summary: Linked   See discharge assessment below for further details    Engagement  Call Start Time: 1214 (6/20/2024 12:14 PM)    Medications  Medications reviewed with patient/caregiver?: Yes (6/20/2024 12:14 PM)  Is the patient having any side effects they believe may be caused by any medication additions or changes?: No (6/20/2024 12:14 PM)  Does the patient have all medications ordered at discharge?: Yes (6/20/2024 12:14 PM)  Care Management Interventions: No intervention needed (6/20/2024 12:14 PM)  Prescription Comments: colace, oxycodone snf invanz (6/20/2024 12:14 PM)  Is the patient taking all medications as directed (includes completed medication regime)?: Yes (6/20/2024 12:14 PM)  Medication Comments: see med list (6/20/2024 12:14 PM)    Appointments  Does the patient have a primary care provider?: Yes (6/20/2024 12:14 PM)  Care Management Interventions: Advised patient to make appointment (6/20/2024 12:14 PM)  Has the patient kept scheduled appointments due by today?: Yes (6/20/2024 12:14 PM)  Care Management Interventions: Advised patient to keep appointment (6/20/2024 12:14 PM)    Self Management  What is the home health agency?:  Home care- IV antibiotics (6/20/2024 12:14 PM)  Has home health visited the patient within 72 hours of discharge?: Yes (6/20/2024 12:14 PM)    Patient Teaching  Does the patient have access to their discharge instructions?: Yes (6/20/2024 12:14 PM)  Care Management Interventions: Reviewed instructions with patient (6/20/2024 12:14 PM)  What is the patient's perception of their health  status since discharge?: Improving (6/20/2024 12:14 PM)  Is the patient/caregiver able to teach back the hierarchy of who to call/visit for symptoms/problems? PCP, Specialist, Home Health nurse, Urgent Care, ED, 911: Yes (6/20/2024 12:14 PM)    Wrap Up  Wrap Up Additional Comments: CTS spoke with patient. She was admitted to [facility] after undergoing Procedure(s) (LRB):  Irrigation and Debridement Knee with Wound Vac Placement (Left) without complications that morning. Discharged on 6/18/2024 with home care for IV antibiotics. Patient stated that she was doing better. She has had a GOUT flare up since leaving the hospital. She has no issues with getting around. She is using crutches and a walker to get around. Reviewed medications with patient. She is not on Lovenox, however she did resume her xarelto. Understands discharge instructions. Needs an appt with PCP, there were none available. Will task office staff for help with this. Seeing wound care today for a wound vac placement. No questions or concerns at this time. (6/20/2024 12:14 PM)  Call End Time: 1220 (6/20/2024 12:14 PM)

## 2024-06-21 ASSESSMENT — ENCOUNTER SYMPTOMS
PAIN LOCATION: LEFT KNEE
PAIN: 1
CHANGE IN APPETITE: UNCHANGED
PAIN LOCATION: LEFT KNEE
LOWER EXTREMITY EDEMA: 1
PAIN LOCATION - PAIN SEVERITY: 7/10
APPETITE LEVEL: GOOD
PAIN LOCATION - PAIN SEVERITY: 5/10
PERSON REPORTING PAIN: PATIENT
PERSON REPORTING PAIN: PATIENT
PAIN: 1

## 2024-06-21 ASSESSMENT — ACTIVITIES OF DAILY LIVING (ADL)
OASIS_M1830: 05
ENTERING_EXITING_HOME: MODERATE ASSIST

## 2024-06-24 ENCOUNTER — HOME CARE VISIT (OUTPATIENT)
Dept: HOME HEALTH SERVICES | Facility: HOME HEALTH | Age: 59
End: 2024-06-24
Payer: COMMERCIAL

## 2024-06-24 VITALS
HEART RATE: 76 BPM | TEMPERATURE: 97.7 F | SYSTOLIC BLOOD PRESSURE: 126 MMHG | DIASTOLIC BLOOD PRESSURE: 82 MMHG | OXYGEN SATURATION: 96 %

## 2024-06-24 PROCEDURE — G0151 HHCP-SERV OF PT,EA 15 MIN: HCPCS

## 2024-06-24 ASSESSMENT — ACTIVITIES OF DAILY LIVING (ADL)
PHYSICAL TRANSFERS ASSESSED: 1
CURRENT_FUNCTION: ONE PERSON
CURRENT_FUNCTION: STAND BY ASSIST
AMBULATION_DISTANCE/DURATION_TOLERATED: 40 FEET
AMBULATION ASSISTANCE: STAND BY ASSIST
AMBULATION ASSISTANCE: ONE PERSON
AMBULATION ASSISTANCE ON FLAT SURFACES: 1
AMBULATION ASSISTANCE: 1

## 2024-06-24 ASSESSMENT — ENCOUNTER SYMPTOMS
MUSCLE WEAKNESS: 1
LIMITED RANGE OF MOTION: 1
PERSON REPORTING PAIN: PATIENT
DENIES PAIN: 1

## 2024-06-25 ENCOUNTER — APPOINTMENT (OUTPATIENT)
Dept: ORTHOPEDIC SURGERY | Facility: CLINIC | Age: 59
End: 2024-06-25
Payer: COMMERCIAL

## 2024-06-26 ENCOUNTER — HOME CARE VISIT (OUTPATIENT)
Dept: HOME HEALTH SERVICES | Facility: HOME HEALTH | Age: 59
End: 2024-06-26
Payer: COMMERCIAL

## 2024-06-26 VITALS
RESPIRATION RATE: 18 BRPM | HEART RATE: 64 BPM | OXYGEN SATURATION: 94 % | SYSTOLIC BLOOD PRESSURE: 120 MMHG | DIASTOLIC BLOOD PRESSURE: 80 MMHG | TEMPERATURE: 98.2 F

## 2024-06-26 PROCEDURE — G0299 HHS/HOSPICE OF RN EA 15 MIN: HCPCS

## 2024-06-26 PROCEDURE — 86140 C-REACTIVE PROTEIN: CPT

## 2024-06-26 PROCEDURE — 85025 COMPLETE CBC W/AUTO DIFF WBC: CPT

## 2024-06-26 PROCEDURE — 99601 HOME NFS VISIT <2 HRS: CPT

## 2024-06-26 PROCEDURE — 80048 BASIC METABOLIC PNL TOTAL CA: CPT

## 2024-06-26 ASSESSMENT — ENCOUNTER SYMPTOMS
FATIGUES EASILY: 1
CHANGE IN APPETITE: UNCHANGED
LOWEST PAIN SEVERITY IN PAST 24 HOURS: 1/10
PAIN LOCATION - PAIN QUALITY: ACHE, PRESSURE
FATIGUE: 1
PAIN LOCATION - PAIN FREQUENCY: FREQUENT
PAIN LOCATION: RIGHT KNEE
LIMITED RANGE OF MOTION: 1
PAIN SEVERITY GOAL: 0/10
PAIN LOCATION - PAIN SEVERITY: 3/10
LAST BOWEL MOVEMENT: 67016
PAIN: 1
HIGHEST PAIN SEVERITY IN PAST 24 HOURS: 4/10
MUSCLE WEAKNESS: 1
LOWER EXTREMITY EDEMA: 1
SUBJECTIVE PAIN PROGRESSION: GRADUALLY IMPROVING
PERSON REPORTING PAIN: PATIENT
APPETITE LEVEL: GOOD

## 2024-06-27 ENCOUNTER — TELEPHONE (OUTPATIENT)
Dept: INFECTIOUS DISEASES | Age: 59
End: 2024-06-27

## 2024-06-27 ENCOUNTER — APPOINTMENT (OUTPATIENT)
Dept: WOUND CARE | Facility: CLINIC | Age: 59
End: 2024-06-27
Payer: COMMERCIAL

## 2024-06-27 ENCOUNTER — HOME INFUSION (OUTPATIENT)
Dept: INFUSION THERAPY | Age: 59
End: 2024-06-27
Payer: COMMERCIAL

## 2024-06-27 ENCOUNTER — LAB REQUISITION (OUTPATIENT)
Dept: LAB | Facility: HOSPITAL | Age: 59
End: 2024-06-27
Payer: COMMERCIAL

## 2024-06-27 DIAGNOSIS — T81.31XA DISRUPTION OF EXTERNAL OPERATION (SURGICAL) WOUND, NOT ELSEWHERE CLASSIFIED, INITIAL ENCOUNTER: ICD-10-CM

## 2024-06-27 DIAGNOSIS — M25.569 KNEE PAIN, UNSPECIFIED CHRONICITY, UNSPECIFIED LATERALITY: Primary | ICD-10-CM

## 2024-06-27 LAB
ANION GAP SERPL CALC-SCNC: 21 MMOL/L (ref 10–20)
BASOPHILS # BLD AUTO: 0.06 X10*3/UL (ref 0–0.1)
BASOPHILS NFR BLD AUTO: 0.8 %
BUN SERPL-MCNC: 20 MG/DL (ref 6–23)
CALCIUM SERPL-MCNC: 9.3 MG/DL (ref 8.6–10.6)
CHLORIDE SERPL-SCNC: 104 MMOL/L (ref 98–107)
CO2 SERPL-SCNC: 23 MMOL/L (ref 21–32)
CREAT SERPL-MCNC: 0.78 MG/DL (ref 0.5–1.05)
CRP SERPL-MCNC: 0.68 MG/DL
EGFRCR SERPLBLD CKD-EPI 2021: 87 ML/MIN/1.73M*2
EOSINOPHIL # BLD AUTO: 0.36 X10*3/UL (ref 0–0.7)
EOSINOPHIL NFR BLD AUTO: 4.9 %
ERYTHROCYTE [DISTWIDTH] IN BLOOD BY AUTOMATED COUNT: 16 % (ref 11.5–14.5)
GLUCOSE SERPL-MCNC: 31 MG/DL (ref 74–99)
HCT VFR BLD AUTO: 48.2 % (ref 36–46)
HGB BLD-MCNC: 14 G/DL (ref 12–16)
IMM GRANULOCYTES # BLD AUTO: 0.03 X10*3/UL (ref 0–0.7)
IMM GRANULOCYTES NFR BLD AUTO: 0.4 % (ref 0–0.9)
LYMPHOCYTES # BLD AUTO: 1.5 X10*3/UL (ref 1.2–4.8)
LYMPHOCYTES NFR BLD AUTO: 20.5 %
MCH RBC QN AUTO: 27.6 PG (ref 26–34)
MCHC RBC AUTO-ENTMCNC: 29 G/DL (ref 32–36)
MCV RBC AUTO: 95 FL (ref 80–100)
MONOCYTES # BLD AUTO: 0.54 X10*3/UL (ref 0.1–1)
MONOCYTES NFR BLD AUTO: 7.4 %
NEUTROPHILS # BLD AUTO: 4.83 X10*3/UL (ref 1.2–7.7)
NEUTROPHILS NFR BLD AUTO: 66 %
NRBC BLD-RTO: 0 /100 WBCS (ref 0–0)
PLATELET # BLD AUTO: 249 X10*3/UL (ref 150–450)
POTASSIUM SERPL-SCNC: 4.7 MMOL/L (ref 3.5–5.3)
RBC # BLD AUTO: 5.08 X10*6/UL (ref 4–5.2)
SODIUM SERPL-SCNC: 143 MMOL/L (ref 136–145)
WBC # BLD AUTO: 7.3 X10*3/UL (ref 4.4–11.3)

## 2024-06-27 NOTE — TELEPHONE ENCOUNTER
Reached out to pt per Dr. Solis's request because pt has a glucose reading of 31 report on 06/27/24 from  lab services.    Per pt she states she is feeling fine. Pt states she is not a diabetic. Pt demonstrates a pleasant, cheerful attitude during conversation.  However, pt states that she does not eat much, generally just dinner.    This LPN stressed to pt the importance of eating x3 meals daily.  Pt states that \"everything taste better since she started her atbx and she will try to eat more.\"    Pt aware of upcoming appointment with Dr. Solis on 07/10/24 @ 11a.  This LPN advised pt if she is feeling lightheaded or out of sorts, to please go to the ER.

## 2024-06-27 NOTE — TELEPHONE ENCOUNTER
Called  pharmacy to give lab orders per Dr. Solis. Spoke to the pharmacist Madhu.    Per Dr. Solis, order a CBC and BMP prior to appointment scheduled tor 07/10/24.    Per Madhu, he will relay the message.

## 2024-06-27 NOTE — PROGRESS NOTES
Patient ordered Ertapenem 1g IV daily for Knee PJI for 40 doses at home = 7/27    Wound Vac changed MWF    Telephone call with patient - independent with infusions - aware of appt with Dr Burnham on 7/10 - agreed to delivery on 7/28 of doses through this appt with usual supplies - has one set of dressing change supplies in the home     Dispense x12 doses of Ertapenem in HP for cmpd on 6/27 and delivery on 6/28  Infusions 6/29 through 7/10    NF 7/10 - confirm no changes after 11a appt with Tej

## 2024-06-28 ENCOUNTER — HOME CARE VISIT (OUTPATIENT)
Dept: HOME HEALTH SERVICES | Facility: HOME HEALTH | Age: 59
End: 2024-06-28
Payer: COMMERCIAL

## 2024-06-28 VITALS
RESPIRATION RATE: 18 BRPM | DIASTOLIC BLOOD PRESSURE: 82 MMHG | HEART RATE: 65 BPM | OXYGEN SATURATION: 96 % | SYSTOLIC BLOOD PRESSURE: 120 MMHG | TEMPERATURE: 98.3 F

## 2024-06-28 VITALS
TEMPERATURE: 97.1 F | DIASTOLIC BLOOD PRESSURE: 74 MMHG | HEART RATE: 71 BPM | SYSTOLIC BLOOD PRESSURE: 117 MMHG | OXYGEN SATURATION: 95 %

## 2024-06-28 PROCEDURE — G0299 HHS/HOSPICE OF RN EA 15 MIN: HCPCS

## 2024-06-28 PROCEDURE — G0151 HHCP-SERV OF PT,EA 15 MIN: HCPCS

## 2024-06-28 ASSESSMENT — ENCOUNTER SYMPTOMS
PERSON REPORTING PAIN: PATIENT
LOWER EXTREMITY EDEMA: 1
DENIES PAIN: 1
CHANGE IN APPETITE: UNCHANGED
APPETITE LEVEL: GOOD
DENIES PAIN: 1

## 2024-06-28 ASSESSMENT — ACTIVITIES OF DAILY LIVING (ADL)
AMBULATION ASSISTANCE ON FLAT SURFACES: 1
AMBULATION ASSISTANCE ON UNEVEN SURFACES: 1

## 2024-06-30 ENCOUNTER — HOME CARE VISIT (OUTPATIENT)
Dept: HOME HEALTH SERVICES | Facility: HOME HEALTH | Age: 59
End: 2024-06-30
Payer: COMMERCIAL

## 2024-07-02 ENCOUNTER — PATIENT OUTREACH (OUTPATIENT)
Dept: PRIMARY CARE | Facility: CLINIC | Age: 59
End: 2024-07-02
Payer: COMMERCIAL

## 2024-07-02 ENCOUNTER — OFFICE VISIT (OUTPATIENT)
Dept: WOUND CARE | Facility: CLINIC | Age: 59
End: 2024-07-02
Payer: COMMERCIAL

## 2024-07-02 PROCEDURE — 97605 NEG PRS WND THER DME<=50SQCM: CPT

## 2024-07-04 ENCOUNTER — HOME CARE VISIT (OUTPATIENT)
Dept: HOME HEALTH SERVICES | Facility: HOME HEALTH | Age: 59
End: 2024-07-04
Payer: COMMERCIAL

## 2024-07-04 VITALS
OXYGEN SATURATION: 96 % | SYSTOLIC BLOOD PRESSURE: 136 MMHG | HEART RATE: 74 BPM | TEMPERATURE: 98.6 F | DIASTOLIC BLOOD PRESSURE: 76 MMHG

## 2024-07-04 PROCEDURE — 99601 HOME NFS VISIT <2 HRS: CPT

## 2024-07-04 PROCEDURE — G0299 HHS/HOSPICE OF RN EA 15 MIN: HCPCS

## 2024-07-05 ASSESSMENT — ENCOUNTER SYMPTOMS
APPETITE LEVEL: GOOD
DENIES PAIN: 1
CHANGE IN APPETITE: UNCHANGED
PERSON REPORTING PAIN: PATIENT

## 2024-07-06 ENCOUNTER — HOME CARE VISIT (OUTPATIENT)
Dept: HOME HEALTH SERVICES | Facility: HOME HEALTH | Age: 59
End: 2024-07-06
Payer: COMMERCIAL

## 2024-07-08 ENCOUNTER — LAB REQUISITION (OUTPATIENT)
Dept: LAB | Facility: LAB | Age: 59
End: 2024-07-08
Payer: COMMERCIAL

## 2024-07-08 ENCOUNTER — HOME CARE VISIT (OUTPATIENT)
Dept: HOME HEALTH SERVICES | Facility: HOME HEALTH | Age: 59
End: 2024-07-08
Payer: COMMERCIAL

## 2024-07-08 VITALS
DIASTOLIC BLOOD PRESSURE: 62 MMHG | TEMPERATURE: 98.9 F | RESPIRATION RATE: 18 BRPM | OXYGEN SATURATION: 99 % | HEART RATE: 88 BPM | SYSTOLIC BLOOD PRESSURE: 104 MMHG

## 2024-07-08 DIAGNOSIS — T81.31XA DISRUPTION OF EXTERNAL OPERATION (SURGICAL) WOUND, NOT ELSEWHERE CLASSIFIED, INITIAL ENCOUNTER: ICD-10-CM

## 2024-07-08 LAB
ANION GAP SERPL CALC-SCNC: 13 MMOL/L (ref 10–20)
BUN SERPL-MCNC: 20 MG/DL (ref 6–23)
CALCIUM SERPL-MCNC: 9.3 MG/DL (ref 8.6–10.3)
CHLORIDE SERPL-SCNC: 107 MMOL/L (ref 98–107)
CO2 SERPL-SCNC: 23 MMOL/L (ref 21–32)
CREAT SERPL-MCNC: 0.91 MG/DL (ref 0.5–1.05)
CRP SERPL-MCNC: 0.59 MG/DL
EGFRCR SERPLBLD CKD-EPI 2021: 72 ML/MIN/1.73M*2
ERYTHROCYTE [DISTWIDTH] IN BLOOD BY AUTOMATED COUNT: 15.5 % (ref 11.5–14.5)
GLUCOSE SERPL-MCNC: 97 MG/DL (ref 74–99)
HCT VFR BLD AUTO: 45.6 % (ref 36–46)
HGB BLD-MCNC: 14.4 G/DL (ref 12–16)
MCH RBC QN AUTO: 28 PG (ref 26–34)
MCHC RBC AUTO-ENTMCNC: 31.6 G/DL (ref 32–36)
MCV RBC AUTO: 89 FL (ref 80–100)
NRBC BLD-RTO: 0 /100 WBCS (ref 0–0)
PLATELET # BLD AUTO: 222 X10*3/UL (ref 150–450)
POTASSIUM SERPL-SCNC: 4.3 MMOL/L (ref 3.5–5.3)
RBC # BLD AUTO: 5.15 X10*6/UL (ref 4–5.2)
SODIUM SERPL-SCNC: 139 MMOL/L (ref 136–145)
WBC # BLD AUTO: 7.3 X10*3/UL (ref 4.4–11.3)

## 2024-07-08 PROCEDURE — 86140 C-REACTIVE PROTEIN: CPT

## 2024-07-08 PROCEDURE — 85027 COMPLETE CBC AUTOMATED: CPT

## 2024-07-08 PROCEDURE — G0299 HHS/HOSPICE OF RN EA 15 MIN: HCPCS

## 2024-07-08 PROCEDURE — 80048 BASIC METABOLIC PNL TOTAL CA: CPT

## 2024-07-09 ASSESSMENT — ENCOUNTER SYMPTOMS
CHANGE IN APPETITE: UNCHANGED
LOWER EXTREMITY EDEMA: 1
DENIES PAIN: 1
PERSON REPORTING PAIN: PATIENT
APPETITE LEVEL: GOOD

## 2024-07-10 ENCOUNTER — HOME INFUSION (OUTPATIENT)
Dept: INFUSION THERAPY | Age: 59
End: 2024-07-10
Payer: COMMERCIAL

## 2024-07-10 ENCOUNTER — OFFICE VISIT (OUTPATIENT)
Dept: INFECTIOUS DISEASES | Age: 59
End: 2024-07-10

## 2024-07-10 VITALS
HEIGHT: 64 IN | HEART RATE: 73 BPM | TEMPERATURE: 97.3 F | SYSTOLIC BLOOD PRESSURE: 109 MMHG | DIASTOLIC BLOOD PRESSURE: 64 MMHG | RESPIRATION RATE: 20 BRPM | BODY MASS INDEX: 41.48 KG/M2 | WEIGHT: 243 LBS

## 2024-07-10 DIAGNOSIS — A49.8 PROTEUS MIRABILIS INFECTION: ICD-10-CM

## 2024-07-10 DIAGNOSIS — B96.89 SEPTIC INFRAPATELLAR BURSITIS OF LEFT KNEE: Primary | ICD-10-CM

## 2024-07-10 DIAGNOSIS — A49.8 PROTEUS INFECTION: ICD-10-CM

## 2024-07-10 DIAGNOSIS — M71.162 SEPTIC INFRAPATELLAR BURSITIS OF LEFT KNEE: Primary | ICD-10-CM

## 2024-07-10 DIAGNOSIS — T84.50XA INFECTION OF PROSTHETIC JOINT, INITIAL ENCOUNTER (HCC): ICD-10-CM

## 2024-07-10 DIAGNOSIS — T81.30XA WOUND DEHISCENCE: ICD-10-CM

## 2024-07-10 RX ORDER — ERTAPENEM 1 G/1
1 INJECTION, POWDER, LYOPHILIZED, FOR SOLUTION INTRAMUSCULAR; INTRAVENOUS EVERY 24 HOURS
Qty: 400 ML | Refills: 0 | Status: SHIPPED
Start: 2024-07-10 | End: 2024-07-18

## 2024-07-10 RX ORDER — LEVOFLOXACIN 500 MG/1
500 TABLET, FILM COATED ORAL DAILY
Qty: 7 TABLET | Refills: 0 | Status: SHIPPED | OUTPATIENT
Start: 2024-07-10 | End: 2024-07-17

## 2024-07-10 ASSESSMENT — PATIENT HEALTH QUESTIONNAIRE - PHQ9
SUM OF ALL RESPONSES TO PHQ9 QUESTIONS 1 & 2: 0
SUM OF ALL RESPONSES TO PHQ QUESTIONS 1-9: 0
2. FEELING DOWN, DEPRESSED OR HOPELESS: NOT AT ALL
1. LITTLE INTEREST OR PLEASURE IN DOING THINGS: NOT AT ALL
SUM OF ALL RESPONSES TO PHQ QUESTIONS 1-9: 0

## 2024-07-10 NOTE — PROGRESS NOTES
Infectious Disease Progress Note       7/10/2024    Patient is a followup regarding admission to [facility] for infection of her left knee.  I had seen her around mid June 2024    Left knee wound dehiscence taken to the OR by orthopedics for I&D of left bursa hematoma, infected.  Patient is status post patellar fracture with open reduction internal fixation. She developed open wound with dehiscence, thus undergoing I&D  Has retained plates and screws    Subjectively, no new complaints at this time.   Feels well overall  Wound VAC is currently off for examination of the wound.  There is red beefy tissue no tissue necrosis no foul smell no purulent drainage that I can see there is minimal drainage in the VAC container.  No surrounding erythema.  Overall the wound looks smaller.    Lab Results   Component Value Date    WBC 8.2 01/01/2020    HGB 8.6 (L) 01/01/2020    HCT 25.7 (L) 01/01/2020    MCV 87.5 01/01/2020     01/01/2020     Lab Results   Component Value Date/Time     12/31/2019 06:10 AM    K 4.6 12/31/2019 06:10 AM     12/31/2019 06:10 AM    CO2 21 12/31/2019 06:10 AM    BUN 23 12/31/2019 06:10 AM    CREATININE 0.79 12/31/2019 06:10 AM    GLUCOSE 123 12/31/2019 06:10 AM    CALCIUM 8.2 12/31/2019 06:10 AM    LABGLOM >60.0 12/31/2019 06:10 AM        WBC trends are being monitored. Antibiotic doses are being adjusted per most recent renal labs.   Vitals:    07/10/24 1134   BP: 109/64   Pulse: 73   Resp: 20   Temp: 97.3 °F (36.3 °C)     Patient is awake and alert, NAD  Ears look ok  No thrush in oropharynx  Neck supple  Chest equal expansion, clear  Heart S1S2  Abdomen ND, NTTP  Extrem no calf pain.  Wound described as above  Expressions symmetrical  No obvious rashes.   Mood and affect appropriate.       Patient Active Problem List   Diagnosis    Osteoarthritis of left hip     6/14/2024 culture from her knee/swab revealed Proteus resistant to tetracycline, beta-lactamase

## 2024-07-10 NOTE — PROGRESS NOTES
Patient ordered Ertapenem 1g IV daily for Knee PJI through 7/18. Followed by [facility]     Wound Vac changed MWF    Per Nathalia at [facility], ok to stop ertapenem after dose 7/18 and pull PICC. Orders entered into Epic.    Pt has a PICC solo    Left msg for pt checking on status of IV antibiotics and notifying her of delivery of more medications today 7/10 by 9pm. Left pharmacy phone for pt to call back if any questions or concerns.     Dispense x8 doses of Ertapenem in HP for cmpd on 7/10 and straight delivery  Infusions 7/11 through 7/18     NF 7/18 - verify stop date with [facility]

## 2024-07-11 ENCOUNTER — LAB REQUISITION (OUTPATIENT)
Dept: LAB | Facility: HOSPITAL | Age: 59
End: 2024-07-11
Payer: COMMERCIAL

## 2024-07-11 ENCOUNTER — HOME CARE VISIT (OUTPATIENT)
Dept: HOME HEALTH SERVICES | Facility: HOME HEALTH | Age: 59
End: 2024-07-11
Payer: COMMERCIAL

## 2024-07-11 ENCOUNTER — OFFICE VISIT (OUTPATIENT)
Dept: WOUND CARE | Facility: CLINIC | Age: 59
End: 2024-07-11
Payer: COMMERCIAL

## 2024-07-11 DIAGNOSIS — L98.493 NON-PRESSURE CHRONIC ULCER OF SKIN OF OTHER SITES WITH NECROSIS OF MUSCLE (MULTI): ICD-10-CM

## 2024-07-11 DIAGNOSIS — F17.210 NICOTINE DEPENDENCE, CIGARETTES, UNCOMPLICATED: ICD-10-CM

## 2024-07-11 DIAGNOSIS — S81.002S: ICD-10-CM

## 2024-07-11 DIAGNOSIS — T81.31XA DISRUPTION OF EXTERNAL OPERATION (SURGICAL) WOUND, NOT ELSEWHERE CLASSIFIED, INITIAL ENCOUNTER: ICD-10-CM

## 2024-07-11 PROCEDURE — 11043 DBRDMT MUSC&/FSCA 1ST 20/<: CPT

## 2024-07-11 PROCEDURE — 97605 NEG PRS WND THER DME<=50SQCM: CPT

## 2024-07-11 PROCEDURE — 87186 SC STD MICRODIL/AGAR DIL: CPT

## 2024-07-11 PROCEDURE — 97605 NEG PRS WND THER DME<=50SQCM: CPT | Performed by: PLASTIC SURGERY

## 2024-07-11 PROCEDURE — 11043 DBRDMT MUSC&/FSCA 1ST 20/<: CPT | Performed by: PLASTIC SURGERY

## 2024-07-11 PROCEDURE — 87205 SMEAR GRAM STAIN: CPT

## 2024-07-11 PROCEDURE — 87070 CULTURE OTHR SPECIMN AEROBIC: CPT

## 2024-07-11 PROCEDURE — 87075 CULTR BACTERIA EXCEPT BLOOD: CPT

## 2024-07-14 LAB
BACTERIA SPEC CULT: ABNORMAL
BACTERIA SPEC CULT: ABNORMAL
GRAM STN SPEC: ABNORMAL
GRAM STN SPEC: ABNORMAL

## 2024-07-15 ENCOUNTER — LAB REQUISITION (OUTPATIENT)
Dept: LAB | Facility: LAB | Age: 59
End: 2024-07-15
Payer: COMMERCIAL

## 2024-07-15 ENCOUNTER — HOME CARE VISIT (OUTPATIENT)
Dept: HOME HEALTH SERVICES | Facility: HOME HEALTH | Age: 59
End: 2024-07-15
Payer: COMMERCIAL

## 2024-07-15 DIAGNOSIS — T81.31XA DISRUPTION OF EXTERNAL OPERATION (SURGICAL) WOUND, NOT ELSEWHERE CLASSIFIED, INITIAL ENCOUNTER: ICD-10-CM

## 2024-07-15 LAB
ANION GAP SERPL CALC-SCNC: 14 MMOL/L (ref 10–20)
BUN SERPL-MCNC: 38 MG/DL (ref 6–23)
CALCIUM SERPL-MCNC: 9.1 MG/DL (ref 8.6–10.3)
CHLORIDE SERPL-SCNC: 105 MMOL/L (ref 98–107)
CO2 SERPL-SCNC: 24 MMOL/L (ref 21–32)
CREAT SERPL-MCNC: 1.31 MG/DL (ref 0.5–1.05)
CRP SERPL-MCNC: 1 MG/DL
EGFRCR SERPLBLD CKD-EPI 2021: 46 ML/MIN/1.73M*2
ERYTHROCYTE [DISTWIDTH] IN BLOOD BY AUTOMATED COUNT: 15.7 % (ref 11.5–14.5)
GLUCOSE SERPL-MCNC: 106 MG/DL (ref 74–99)
HCT VFR BLD AUTO: 47.3 % (ref 36–46)
HGB BLD-MCNC: 15 G/DL (ref 12–16)
MCH RBC QN AUTO: 28 PG (ref 26–34)
MCHC RBC AUTO-ENTMCNC: 31.7 G/DL (ref 32–36)
MCV RBC AUTO: 88 FL (ref 80–100)
NRBC BLD-RTO: 0 /100 WBCS (ref 0–0)
PLATELET # BLD AUTO: 202 X10*3/UL (ref 150–450)
POTASSIUM SERPL-SCNC: 4.4 MMOL/L (ref 3.5–5.3)
RBC # BLD AUTO: 5.35 X10*6/UL (ref 4–5.2)
SODIUM SERPL-SCNC: 139 MMOL/L (ref 136–145)
WBC # BLD AUTO: 7.8 X10*3/UL (ref 4.4–11.3)

## 2024-07-15 PROCEDURE — 85027 COMPLETE CBC AUTOMATED: CPT

## 2024-07-15 PROCEDURE — G0299 HHS/HOSPICE OF RN EA 15 MIN: HCPCS

## 2024-07-15 PROCEDURE — 86140 C-REACTIVE PROTEIN: CPT

## 2024-07-15 PROCEDURE — 99601 HOME NFS VISIT <2 HRS: CPT

## 2024-07-15 PROCEDURE — 80048 BASIC METABOLIC PNL TOTAL CA: CPT

## 2024-07-15 ASSESSMENT — ENCOUNTER SYMPTOMS
APPETITE LEVEL: GOOD
PAIN LOCATION - PAIN SEVERITY: 2/10
PERSON REPORTING PAIN: PATIENT
PAIN: 1
CHANGE IN APPETITE: UNCHANGED
PAIN LOCATION: LEFT KNEE

## 2024-07-16 ENCOUNTER — HOSPITAL ENCOUNTER (OUTPATIENT)
Dept: RADIOLOGY | Facility: CLINIC | Age: 59
Discharge: HOME | End: 2024-07-16
Payer: COMMERCIAL

## 2024-07-16 ENCOUNTER — OFFICE VISIT (OUTPATIENT)
Dept: ORTHOPEDIC SURGERY | Facility: CLINIC | Age: 59
End: 2024-07-16
Payer: COMMERCIAL

## 2024-07-16 DIAGNOSIS — G89.18 POST-OP PAIN: Primary | ICD-10-CM

## 2024-07-16 DIAGNOSIS — S82.002A CLOSED DISPLACED FRACTURE OF LEFT PATELLA, UNSPECIFIED FRACTURE MORPHOLOGY, INITIAL ENCOUNTER: ICD-10-CM

## 2024-07-16 PROCEDURE — 73560 X-RAY EXAM OF KNEE 1 OR 2: CPT | Mod: LT

## 2024-07-16 PROCEDURE — 73560 X-RAY EXAM OF KNEE 1 OR 2: CPT | Mod: LEFT SIDE | Performed by: RADIOLOGY

## 2024-07-16 PROCEDURE — 99211 OFF/OP EST MAY X REQ PHY/QHP: CPT | Performed by: STUDENT IN AN ORGANIZED HEALTH CARE EDUCATION/TRAINING PROGRAM

## 2024-07-16 NOTE — PROGRESS NOTES
History of Present Illness:   Patient returns today status post left patellar fracture status post ORIF with subsequent wound complication and infection.  She had a washout performed on 6/14/24 and her original surgery was on 5/3/2024.  She notes that the wound care has progressed and she feels significantly better and she is removing her PICC line coming up soon.  The wound VAC is been in place for several weeks and she does changes every other day.  She overall feels like she is progressing however she states that her wound care providers have told her the plate for the patella must be removed prior to advancing her wound care and formally closing the wound.    Review of Systems   GENERAL: Negative for malaise, significant weight loss, fever  MUSCULOSKELETAL: see HPI  NEURO:  Negative    Physical Examination:  Left knee:  There is moderate gross swelling without significant ecchymosis, distal extremity dusky pigmentation and erythema noted that is present on bilateral legs.  No varus malalignment  No valgus malalignment   Mild effusion  ROM:  Range of motion limited compared to contralateral side based off wound VAC placement and patella fracture.  Limited about 0-60 degrees.     Wound VAC was placed today over the wound, however through the wound VAC we are able to see appropriate wound borders without significant discoloration or drainage.  Documented pictures of the patient's left knee from earlier in the week do reveal a healthy amount of new skin lying over the incision however there is a small open area about 2.5 x 1.5 cm where the plate is exposed, no obvious evidence of drainage or exudate, mild surrounding erythema appropriate for wound healing.     Dusky pigmentation distally    Imaging:  Plain films of the left knee reveal left patella fracture status post ORIF with good position alignment of hardware.  Difficult to assess degree of healing based off of artifact from x-ray today.  Would recommend CT  scan for definitive diagnosis of healing patella fracture.    Assessment:   Patient with left knee pain status post patella fracture with ORIF and subsequent wound complication with wound VAC placement    Plan:  We reviewed a variety of treatment option for the patient today.  We encouraged CT scan to ensure the patella fracture is healing/healed.  Once we know more information with CT scan we will talk about options to remove the plate as we do feel this will progress the wound healing, wound care feels similarly.  Given her timeline from the original ORIF on 5/3/2024, we do feel 3 months is appropriate for healing in order to take the patellar plate out.  Continue wound care, continue wound VAC.    Estiven Goodrich PA-C

## 2024-07-17 ENCOUNTER — HOME INFUSION (OUTPATIENT)
Dept: INFUSION THERAPY | Age: 59
End: 2024-07-17
Payer: COMMERCIAL

## 2024-07-18 ENCOUNTER — HOME CARE VISIT (OUTPATIENT)
Dept: HOME HEALTH SERVICES | Facility: HOME HEALTH | Age: 59
End: 2024-07-18
Payer: COMMERCIAL

## 2024-07-18 ENCOUNTER — OFFICE VISIT (OUTPATIENT)
Dept: WOUND CARE | Facility: CLINIC | Age: 59
End: 2024-07-18
Payer: COMMERCIAL

## 2024-07-18 ENCOUNTER — HOME INFUSION (OUTPATIENT)
Dept: INFUSION THERAPY | Age: 59
End: 2024-07-18
Payer: COMMERCIAL

## 2024-07-18 VITALS
OXYGEN SATURATION: 97 % | TEMPERATURE: 98.6 F | DIASTOLIC BLOOD PRESSURE: 64 MMHG | HEART RATE: 88 BPM | SYSTOLIC BLOOD PRESSURE: 122 MMHG | RESPIRATION RATE: 18 BRPM

## 2024-07-18 PROCEDURE — 97605 NEG PRS WND THER DME<=50SQCM: CPT

## 2024-07-18 PROCEDURE — G0299 HHS/HOSPICE OF RN EA 15 MIN: HCPCS

## 2024-07-18 PROCEDURE — 11042 DBRDMT SUBQ TIS 1ST 20SQCM/<: CPT

## 2024-07-18 PROCEDURE — 99601 HOME NFS VISIT <2 HRS: CPT

## 2024-07-18 NOTE — PROGRESS NOTES
HC RN removed PICC line at visit today, patient no longer has IV needs    Chart forwarded to patient care rep to discharge from pharmacy service

## 2024-07-19 ASSESSMENT — ENCOUNTER SYMPTOMS
DENIES PAIN: 1
PERSON REPORTING PAIN: PATIENT
APPETITE LEVEL: GOOD

## 2024-07-22 ENCOUNTER — HOME CARE VISIT (OUTPATIENT)
Dept: HOME HEALTH SERVICES | Facility: HOME HEALTH | Age: 59
End: 2024-07-22
Payer: COMMERCIAL

## 2024-07-22 PROCEDURE — G0300 HHS/HOSPICE OF LPN EA 15 MIN: HCPCS

## 2024-07-25 ENCOUNTER — OFFICE VISIT (OUTPATIENT)
Dept: WOUND CARE | Facility: CLINIC | Age: 59
End: 2024-07-25
Payer: COMMERCIAL

## 2024-07-25 PROCEDURE — 97605 NEG PRS WND THER DME<=50SQCM: CPT

## 2024-07-25 PROCEDURE — 11043 DBRDMT MUSC&/FSCA 1ST 20/<: CPT | Performed by: PLASTIC SURGERY

## 2024-07-25 PROCEDURE — 97605 NEG PRS WND THER DME<=50SQCM: CPT | Performed by: PLASTIC SURGERY

## 2024-07-25 PROCEDURE — 11043 DBRDMT MUSC&/FSCA 1ST 20/<: CPT

## 2024-07-25 SDOH — ECONOMIC STABILITY: GENERAL

## 2024-07-25 ASSESSMENT — ENCOUNTER SYMPTOMS
LIMITED RANGE OF MOTION: 1
APPETITE LEVEL: GOOD
PAIN: 1
PAIN SEVERITY GOAL: 0/10
PAIN LOCATION: LEFT KNEE
PAIN LOCATION - PAIN SEVERITY: 3/10
HIGHEST PAIN SEVERITY IN PAST 24 HOURS: 3/10
LOWEST PAIN SEVERITY IN PAST 24 HOURS: 3/10
PERSON REPORTING PAIN: PATIENT
MUSCLE WEAKNESS: 1
CHANGE IN APPETITE: UNCHANGED

## 2024-07-25 ASSESSMENT — ACTIVITIES OF DAILY LIVING (ADL)
DRESSING_UB_CURRENT_FUNCTION: INDEPENDENT
FEEDING ASSESSED: 1
AMBULATION ASSISTANCE: INDEPENDENT
TOILETING: INDEPENDENT
AMBULATION ASSISTANCE: 1
TOILETING: 1
MONEY MANAGEMENT (EXPENSES/BILLS): INDEPENDENT
BATHING ASSESSED: 1
FEEDING: INDEPENDENT
BATHING_CURRENT_FUNCTION: INDEPENDENT
DRESSING_LB_CURRENT_FUNCTION: INDEPENDENT

## 2024-07-26 NOTE — HOME HEALTH
Patient was seen for wound vac dressing change. Patient tolerated well. No further concerns at this time.

## 2024-07-29 ENCOUNTER — HOME CARE VISIT (OUTPATIENT)
Dept: HOME HEALTH SERVICES | Facility: HOME HEALTH | Age: 59
End: 2024-07-29
Payer: COMMERCIAL

## 2024-07-29 PROCEDURE — 99601 HOME NFS VISIT <2 HRS: CPT

## 2024-07-29 PROCEDURE — G0299 HHS/HOSPICE OF RN EA 15 MIN: HCPCS

## 2024-07-29 ASSESSMENT — ENCOUNTER SYMPTOMS
PERSON REPORTING PAIN: PATIENT
DENIES PAIN: 1
APPETITE LEVEL: GOOD
CHANGE IN APPETITE: UNCHANGED

## 2024-08-01 ENCOUNTER — HOSPITAL ENCOUNTER (OUTPATIENT)
Dept: RADIOLOGY | Facility: HOSPITAL | Age: 59
Discharge: HOME | End: 2024-08-01
Payer: COMMERCIAL

## 2024-08-01 ENCOUNTER — PATIENT OUTREACH (OUTPATIENT)
Dept: PRIMARY CARE | Facility: CLINIC | Age: 59
End: 2024-08-01
Payer: COMMERCIAL

## 2024-08-01 ENCOUNTER — OFFICE VISIT (OUTPATIENT)
Dept: WOUND CARE | Facility: CLINIC | Age: 59
End: 2024-08-01
Payer: COMMERCIAL

## 2024-08-01 DIAGNOSIS — S82.002A CLOSED DISPLACED FRACTURE OF LEFT PATELLA, UNSPECIFIED FRACTURE MORPHOLOGY, INITIAL ENCOUNTER: ICD-10-CM

## 2024-08-01 PROCEDURE — 97605 NEG PRS WND THER DME<=50SQCM: CPT | Performed by: PLASTIC SURGERY

## 2024-08-01 PROCEDURE — 97605 NEG PRS WND THER DME<=50SQCM: CPT

## 2024-08-01 PROCEDURE — 11042 DBRDMT SUBQ TIS 1ST 20SQCM/<: CPT | Performed by: PLASTIC SURGERY

## 2024-08-01 PROCEDURE — 11042 DBRDMT SUBQ TIS 1ST 20SQCM/<: CPT

## 2024-08-01 PROCEDURE — 73700 CT LOWER EXTREMITY W/O DYE: CPT | Mod: LT

## 2024-08-04 DIAGNOSIS — I50.22 CHRONIC SYSTOLIC (CONGESTIVE) HEART FAILURE (MULTI): ICD-10-CM

## 2024-08-05 ENCOUNTER — HOME CARE VISIT (OUTPATIENT)
Dept: HOME HEALTH SERVICES | Facility: HOME HEALTH | Age: 59
End: 2024-08-05
Payer: COMMERCIAL

## 2024-08-05 VITALS
RESPIRATION RATE: 18 BRPM | OXYGEN SATURATION: 96 % | HEART RATE: 88 BPM | TEMPERATURE: 97.3 F | DIASTOLIC BLOOD PRESSURE: 70 MMHG | SYSTOLIC BLOOD PRESSURE: 112 MMHG

## 2024-08-05 PROCEDURE — G0299 HHS/HOSPICE OF RN EA 15 MIN: HCPCS

## 2024-08-05 PROCEDURE — 99601 HOME NFS VISIT <2 HRS: CPT

## 2024-08-05 RX ORDER — BUMETANIDE 2 MG/1
2 TABLET ORAL 2 TIMES DAILY
Qty: 180 TABLET | Refills: 3 | Status: SHIPPED | OUTPATIENT
Start: 2024-08-05

## 2024-08-05 ASSESSMENT — ENCOUNTER SYMPTOMS
APPETITE LEVEL: GOOD
PAIN: 1
PAIN LOCATION: RIGHT KNEE
PERSON REPORTING PAIN: PATIENT
PAIN LOCATION - PAIN SEVERITY: 5/10
CHANGE IN APPETITE: UNCHANGED

## 2024-08-08 ENCOUNTER — TELEPHONE VISIT (OUTPATIENT)
Dept: ORTHOPEDIC SURGERY | Facility: CLINIC | Age: 59
End: 2024-08-08
Payer: COMMERCIAL

## 2024-08-08 ENCOUNTER — APPOINTMENT (OUTPATIENT)
Dept: WOUND CARE | Facility: CLINIC | Age: 59
End: 2024-08-08
Payer: COMMERCIAL

## 2024-08-08 DIAGNOSIS — S82.002A CLOSED DISPLACED FRACTURE OF LEFT PATELLA, UNSPECIFIED FRACTURE MORPHOLOGY, INITIAL ENCOUNTER: Primary | ICD-10-CM

## 2024-08-08 PROBLEM — T84.84XA PAIN DUE TO INTERNAL ORTHOPEDIC PROSTHETIC DEVICES, IMPLANTS AND GRAFTS, INITIAL ENCOUNTER (CMS-HCC): Status: ACTIVE | Noted: 2024-08-08

## 2024-08-08 NOTE — PROGRESS NOTES
Discussed with patient good evidence of healing on the CT scan.  Based on failure to improve and wound issues recommend hardware removal.  We reviewed procedure risk benefits anticipated recovery with the patient.

## 2024-08-12 ENCOUNTER — TELEPHONE (OUTPATIENT)
Dept: ORTHOPEDIC SURGERY | Facility: CLINIC | Age: 59
End: 2024-08-12
Payer: COMMERCIAL

## 2024-08-12 ENCOUNTER — HOME CARE VISIT (OUTPATIENT)
Dept: HOME HEALTH SERVICES | Facility: HOME HEALTH | Age: 59
End: 2024-08-12
Payer: COMMERCIAL

## 2024-08-12 NOTE — TELEPHONE ENCOUNTER
Pt called asking if Dr. Mcghee would want to start her on any type of antibiotic before her hardware removal this Friday in the event she still has an infection.

## 2024-08-13 NOTE — TELEPHONE ENCOUNTER
Lvm for Esthela, she does not need antibiotics before procedure. She iwll be give some through IV for surgery.

## 2024-08-14 RX ORDER — SODIUM CHLORIDE, SODIUM LACTATE, POTASSIUM CHLORIDE, CALCIUM CHLORIDE 600; 310; 30; 20 MG/100ML; MG/100ML; MG/100ML; MG/100ML
20 INJECTION, SOLUTION INTRAVENOUS CONTINUOUS
Status: CANCELLED | OUTPATIENT
Start: 2024-08-14

## 2024-08-14 NOTE — H&P
History Of Present Illness  Esthela Guerrero is a 59 y.o. female presenting with left knee pain and exposed hardware, after a round of wound care treatments for wound vac p[lacements.     Past Medical History  She has a past medical history of Acute on chronic diastolic heart failure with preserved ejection fraction (Multi) (04/26/2023), Anxiety, Arthritis, CHF (congestive heart failure) (Multi), Depression, Factor V Leiden (Multi), Fractures, Gout, Hepatomegaly, not elsewhere classified, History of blood clots, PONV (postoperative nausea and vomiting), Psoriasis, Skin disorder, Sleep apnea, Snores, Unsteady gait, Uses crutches, and Wears glasses.    Surgical History  She has a past surgical history that includes Tubal ligation; Cholecystectomy; Cardiac catheterization; Hip Arthroplasty (Left); and Leg Surgery (Left).     Social History  She reports that she has been smoking cigarettes. She has never used smokeless tobacco. She reports current alcohol use. She reports that she does not use drugs.    Family History  Family History   Problem Relation Name Age of Onset    Aneurysm Mother      No Known Problems Father          Allergies  Ceftriaxone, Codeine, Penicillins, Percocet [oxycodone-acetaminophen], Sacubitril-valsartan, Sulfamethoxazole-trimethoprim, Hydrogen peroxide, and Latex    Review of Systems   CONSTITUTIONAL: Denies weight loss, fever and chills.    - HEENT: Denies changes in vision and hearing.    - RESPIRATORY: Denies SOB and cough.    - CV: Denies palpitations and CP.    - GI: Denies abdominal pain, nausea, vomiting and diarrhea.    - : Denies dysuria and urinary frequency.    - MSK: See physical exam findings     - SKIN: Denies rash and pruritus.    - NEUROLOGICAL: Denies headache and syncope.    - PSYCHIATRIC: Denies recent changes in mood. Denies anxiety and depression.      Physical Exam  - GENERAL: Alert and oriented x 3. No acute distress. Well-nourished.    - EYES: EOMI. Anicteric.    - HENT:  Moist mucous membranes. No scleral icterus. No cervical lymphadenopathy.    - LUNGS: Clear to auscultation bilaterally. No accessory muscle use.    - CARDIOVASCULAR: Regular rate and rhythm. No murmur. No JVD.    - ABDOMEN: Soft, non-tender and non-distended. No palpable masses.    - EXTREMITIES: Visible hardware s/p patella ORIF with healthy healing skin boarders, raw dermis exposed, no significant drainage noted.      - NEUROLOGIC: No focal neurological deficits. CN II-XII grossly intact, but not individually tested.    - PSYCHIATRIC: Cooperative. Appropriate mood and affect.      Last Recorded Vitals  There were no vitals taken for this visit.    Relevant Results      Scheduled medications    Continuous medications    PRN medications    No results found for this or any previous visit (from the past 24 hour(s)).    Assessment/Plan   Assessment & Plan  Pain due to internal orthopedic prosthetic devices, implants and grafts, initial encounter (CMS-[facility])      Discussed hardware removal of the left patella hardware, she is agreeable and we will proceed.        I spent 10 minutes in the professional and overall care of this patient.      Estiven Goodrich PA-C

## 2024-08-15 ENCOUNTER — APPOINTMENT (OUTPATIENT)
Dept: WOUND CARE | Facility: CLINIC | Age: 59
End: 2024-08-15
Payer: COMMERCIAL

## 2024-08-16 ENCOUNTER — ANESTHESIA EVENT (OUTPATIENT)
Dept: OPERATING ROOM | Facility: HOSPITAL | Age: 59
End: 2024-08-16
Payer: COMMERCIAL

## 2024-08-16 ENCOUNTER — ANESTHESIA (OUTPATIENT)
Dept: OPERATING ROOM | Facility: HOSPITAL | Age: 59
End: 2024-08-16
Payer: COMMERCIAL

## 2024-08-16 ENCOUNTER — APPOINTMENT (OUTPATIENT)
Dept: RADIOLOGY | Facility: HOSPITAL | Age: 59
End: 2024-08-16
Payer: COMMERCIAL

## 2024-08-16 ENCOUNTER — HOSPITAL ENCOUNTER (OUTPATIENT)
Facility: HOSPITAL | Age: 59
Setting detail: OUTPATIENT SURGERY
Discharge: HOME | End: 2024-08-16
Attending: ORTHOPAEDIC SURGERY | Admitting: ORTHOPAEDIC SURGERY
Payer: COMMERCIAL

## 2024-08-16 VITALS
RESPIRATION RATE: 15 BRPM | TEMPERATURE: 97.2 F | BODY MASS INDEX: 42.34 KG/M2 | SYSTOLIC BLOOD PRESSURE: 112 MMHG | OXYGEN SATURATION: 95 % | DIASTOLIC BLOOD PRESSURE: 51 MMHG | HEIGHT: 64 IN | WEIGHT: 248 LBS | HEART RATE: 51 BPM

## 2024-08-16 DIAGNOSIS — T84.84XA PAIN DUE TO INTERNAL ORTHOPEDIC PROSTHETIC DEVICES, IMPLANTS AND GRAFTS, INITIAL ENCOUNTER (CMS-HCC): ICD-10-CM

## 2024-08-16 DIAGNOSIS — Z98.890 STATUS POST INCISION AND DRAINAGE: ICD-10-CM

## 2024-08-16 DIAGNOSIS — G89.18 POST-OPERATIVE PAIN: Primary | ICD-10-CM

## 2024-08-16 LAB
ABO GROUP (TYPE) IN BLOOD: NORMAL
ANTIBODY SCREEN: NORMAL
RH FACTOR (ANTIGEN D): NORMAL

## 2024-08-16 PROCEDURE — 3600000004 HC OR TIME - INITIAL BASE CHARGE - PROCEDURE LEVEL FOUR: Performed by: ORTHOPAEDIC SURGERY

## 2024-08-16 PROCEDURE — 2500000004 HC RX 250 GENERAL PHARMACY W/ HCPCS (ALT 636 FOR OP/ED): Performed by: NURSE ANESTHETIST, CERTIFIED REGISTERED

## 2024-08-16 PROCEDURE — 7100000001 HC RECOVERY ROOM TIME - INITIAL BASE CHARGE: Performed by: ORTHOPAEDIC SURGERY

## 2024-08-16 PROCEDURE — 3700000002 HC GENERAL ANESTHESIA TIME - EACH INCREMENTAL 1 MINUTE: Performed by: ORTHOPAEDIC SURGERY

## 2024-08-16 PROCEDURE — 2500000001 HC RX 250 WO HCPCS SELF ADMINISTERED DRUGS (ALT 637 FOR MEDICARE OP): Performed by: STUDENT IN AN ORGANIZED HEALTH CARE EDUCATION/TRAINING PROGRAM

## 2024-08-16 PROCEDURE — 2500000004 HC RX 250 GENERAL PHARMACY W/ HCPCS (ALT 636 FOR OP/ED): Mod: JZ | Performed by: STUDENT IN AN ORGANIZED HEALTH CARE EDUCATION/TRAINING PROGRAM

## 2024-08-16 PROCEDURE — 2500000004 HC RX 250 GENERAL PHARMACY W/ HCPCS (ALT 636 FOR OP/ED): Performed by: ANESTHESIOLOGY

## 2024-08-16 PROCEDURE — 7100000010 HC PHASE TWO TIME - EACH INCREMENTAL 1 MINUTE: Performed by: ORTHOPAEDIC SURGERY

## 2024-08-16 PROCEDURE — 7100000009 HC PHASE TWO TIME - INITIAL BASE CHARGE: Performed by: ORTHOPAEDIC SURGERY

## 2024-08-16 PROCEDURE — 3700000001 HC GENERAL ANESTHESIA TIME - INITIAL BASE CHARGE: Performed by: ORTHOPAEDIC SURGERY

## 2024-08-16 PROCEDURE — 2500000005 HC RX 250 GENERAL PHARMACY W/O HCPCS: Performed by: NURSE ANESTHETIST, CERTIFIED REGISTERED

## 2024-08-16 PROCEDURE — 20680 REMOVAL OF IMPLANT DEEP: CPT | Performed by: ORTHOPAEDIC SURGERY

## 2024-08-16 PROCEDURE — 7100000002 HC RECOVERY ROOM TIME - EACH INCREMENTAL 1 MINUTE: Performed by: ORTHOPAEDIC SURGERY

## 2024-08-16 PROCEDURE — 2500000004 HC RX 250 GENERAL PHARMACY W/ HCPCS (ALT 636 FOR OP/ED): Performed by: STUDENT IN AN ORGANIZED HEALTH CARE EDUCATION/TRAINING PROGRAM

## 2024-08-16 PROCEDURE — 36415 COLL VENOUS BLD VENIPUNCTURE: CPT | Performed by: STUDENT IN AN ORGANIZED HEALTH CARE EDUCATION/TRAINING PROGRAM

## 2024-08-16 PROCEDURE — 2500000005 HC RX 250 GENERAL PHARMACY W/O HCPCS: Performed by: STUDENT IN AN ORGANIZED HEALTH CARE EDUCATION/TRAINING PROGRAM

## 2024-08-16 PROCEDURE — 2720000007 HC OR 272 NO HCPCS: Performed by: ORTHOPAEDIC SURGERY

## 2024-08-16 PROCEDURE — 86901 BLOOD TYPING SEROLOGIC RH(D): CPT | Performed by: STUDENT IN AN ORGANIZED HEALTH CARE EDUCATION/TRAINING PROGRAM

## 2024-08-16 PROCEDURE — 3600000009 HC OR TIME - EACH INCREMENTAL 1 MINUTE - PROCEDURE LEVEL FOUR: Performed by: ORTHOPAEDIC SURGERY

## 2024-08-16 PROCEDURE — 97605 NEG PRS WND THER DME<=50SQCM: CPT | Performed by: ORTHOPAEDIC SURGERY

## 2024-08-16 RX ORDER — SODIUM CHLORIDE, SODIUM LACTATE, POTASSIUM CHLORIDE, CALCIUM CHLORIDE 600; 310; 30; 20 MG/100ML; MG/100ML; MG/100ML; MG/100ML
100 INJECTION, SOLUTION INTRAVENOUS CONTINUOUS
Status: DISCONTINUED | OUTPATIENT
Start: 2024-08-16 | End: 2024-08-19 | Stop reason: HOSPADM

## 2024-08-16 RX ORDER — LIDOCAINE HYDROCHLORIDE 10 MG/ML
0.1 INJECTION INFILTRATION; PERINEURAL ONCE
Status: DISCONTINUED | OUTPATIENT
Start: 2024-08-16 | End: 2024-08-19 | Stop reason: HOSPADM

## 2024-08-16 RX ORDER — ONDANSETRON HYDROCHLORIDE 2 MG/ML
INJECTION, SOLUTION INTRAVENOUS AS NEEDED
Status: DISCONTINUED | OUTPATIENT
Start: 2024-08-16 | End: 2024-08-16

## 2024-08-16 RX ORDER — ROPIVACAINE HYDROCHLORIDE 5 MG/ML
INJECTION, SOLUTION EPIDURAL; INFILTRATION; PERINEURAL AS NEEDED
Status: DISCONTINUED | OUTPATIENT
Start: 2024-08-16 | End: 2024-08-16

## 2024-08-16 RX ORDER — ONDANSETRON HYDROCHLORIDE 2 MG/ML
4 INJECTION, SOLUTION INTRAVENOUS ONCE AS NEEDED
Status: COMPLETED | OUTPATIENT
Start: 2024-08-16 | End: 2024-08-16

## 2024-08-16 RX ORDER — OXYCODONE AND ACETAMINOPHEN 5; 325 MG/1; MG/1
1 TABLET ORAL EVERY 4 HOURS PRN
Qty: 28 TABLET | Refills: 0 | Status: SHIPPED | OUTPATIENT
Start: 2024-08-16 | End: 2024-08-23

## 2024-08-16 RX ORDER — SODIUM CHLORIDE, SODIUM LACTATE, POTASSIUM CHLORIDE, CALCIUM CHLORIDE 600; 310; 30; 20 MG/100ML; MG/100ML; MG/100ML; MG/100ML
20 INJECTION, SOLUTION INTRAVENOUS CONTINUOUS
Status: DISCONTINUED | OUTPATIENT
Start: 2024-08-16 | End: 2024-08-19 | Stop reason: HOSPADM

## 2024-08-16 RX ORDER — GLYCOPYRROLATE 0.2 MG/ML
INJECTION INTRAMUSCULAR; INTRAVENOUS AS NEEDED
Status: DISCONTINUED | OUTPATIENT
Start: 2024-08-16 | End: 2024-08-16

## 2024-08-16 RX ORDER — FENTANYL CITRATE 50 UG/ML
50 INJECTION, SOLUTION INTRAMUSCULAR; INTRAVENOUS EVERY 5 MIN PRN
Status: DISCONTINUED | OUTPATIENT
Start: 2024-08-16 | End: 2024-08-19 | Stop reason: HOSPADM

## 2024-08-16 RX ORDER — NEOSTIGMINE METHYLSULFATE 1 MG/ML
INJECTION INTRAVENOUS AS NEEDED
Status: DISCONTINUED | OUTPATIENT
Start: 2024-08-16 | End: 2024-08-16

## 2024-08-16 RX ORDER — PHENYLEPHRINE HYDROCHLORIDE 10 MG/ML
INJECTION INTRAVENOUS AS NEEDED
Status: DISCONTINUED | OUTPATIENT
Start: 2024-08-16 | End: 2024-08-16

## 2024-08-16 RX ORDER — PROPOFOL 10 MG/ML
INJECTION, EMULSION INTRAVENOUS CONTINUOUS PRN
Status: DISCONTINUED | OUTPATIENT
Start: 2024-08-16 | End: 2024-08-16

## 2024-08-16 RX ORDER — CLINDAMYCIN PHOSPHATE 900 MG/50ML
900 INJECTION, SOLUTION INTRAVENOUS ONCE
Status: COMPLETED | OUTPATIENT
Start: 2024-08-16 | End: 2024-08-16

## 2024-08-16 RX ORDER — ALBUTEROL SULFATE 0.83 MG/ML
2.5 SOLUTION RESPIRATORY (INHALATION) ONCE AS NEEDED
Status: DISCONTINUED | OUTPATIENT
Start: 2024-08-16 | End: 2024-08-19 | Stop reason: HOSPADM

## 2024-08-16 RX ORDER — ACETAMINOPHEN 325 MG/1
975 TABLET ORAL ONCE
Status: DISCONTINUED | OUTPATIENT
Start: 2024-08-16 | End: 2024-08-19 | Stop reason: HOSPADM

## 2024-08-16 RX ORDER — LIDOCAINE HYDROCHLORIDE 20 MG/ML
INJECTION, SOLUTION EPIDURAL; INFILTRATION; INTRACAUDAL; PERINEURAL AS NEEDED
Status: DISCONTINUED | OUTPATIENT
Start: 2024-08-16 | End: 2024-08-16

## 2024-08-16 RX ORDER — MIDAZOLAM HYDROCHLORIDE 1 MG/ML
INJECTION, SOLUTION INTRAMUSCULAR; INTRAVENOUS CONTINUOUS PRN
Status: DISCONTINUED | OUTPATIENT
Start: 2024-08-16 | End: 2024-08-16

## 2024-08-16 RX ORDER — LABETALOL HYDROCHLORIDE 5 MG/ML
5 INJECTION, SOLUTION INTRAVENOUS ONCE AS NEEDED
Status: DISCONTINUED | OUTPATIENT
Start: 2024-08-16 | End: 2024-08-19 | Stop reason: HOSPADM

## 2024-08-16 RX ORDER — FENTANYL CITRATE 50 UG/ML
INJECTION, SOLUTION INTRAMUSCULAR; INTRAVENOUS CONTINUOUS PRN
Status: DISCONTINUED | OUTPATIENT
Start: 2024-08-16 | End: 2024-08-16

## 2024-08-16 RX ORDER — OXYCODONE HYDROCHLORIDE 5 MG/1
5 TABLET ORAL EVERY 4 HOURS PRN
Status: DISCONTINUED | OUTPATIENT
Start: 2024-08-16 | End: 2024-08-19 | Stop reason: HOSPADM

## 2024-08-16 RX ORDER — SULFAMETHOXAZOLE AND TRIMETHOPRIM 800; 160 MG/1; MG/1
1 TABLET ORAL 2 TIMES DAILY
Qty: 14 TABLET | Refills: 0 | Status: SHIPPED | OUTPATIENT
Start: 2024-08-16

## 2024-08-16 RX ORDER — HYDROMORPHONE HYDROCHLORIDE 1 MG/ML
INJECTION, SOLUTION INTRAMUSCULAR; INTRAVENOUS; SUBCUTANEOUS AS NEEDED
Status: DISCONTINUED | OUTPATIENT
Start: 2024-08-16 | End: 2024-08-16

## 2024-08-16 RX ORDER — DIPHENHYDRAMINE HYDROCHLORIDE 50 MG/ML
12.5 INJECTION INTRAMUSCULAR; INTRAVENOUS ONCE AS NEEDED
Status: DISCONTINUED | OUTPATIENT
Start: 2024-08-16 | End: 2024-08-19 | Stop reason: HOSPADM

## 2024-08-16 RX ORDER — MIDAZOLAM HYDROCHLORIDE 1 MG/ML
1 INJECTION, SOLUTION INTRAMUSCULAR; INTRAVENOUS ONCE
Status: COMPLETED | OUTPATIENT
Start: 2024-08-16 | End: 2024-08-16

## 2024-08-16 SDOH — HEALTH STABILITY: MENTAL HEALTH: CURRENT SMOKER: 0

## 2024-08-16 ASSESSMENT — PAIN DESCRIPTION - DESCRIPTORS
DESCRIPTORS: BURNING
DESCRIPTORS: BURNING
DESCRIPTORS: DISCOMFORT;ACHING

## 2024-08-16 ASSESSMENT — PAIN DESCRIPTION - LOCATION
LOCATION: LEG
LOCATION: KNEE
LOCATION: KNEE

## 2024-08-16 ASSESSMENT — PAIN SCALES - GENERAL
PAINLEVEL_OUTOF10: 7
PAINLEVEL_OUTOF10: 6
PAINLEVEL_OUTOF10: 10 - WORST POSSIBLE PAIN
PAINLEVEL_OUTOF10: 10 - WORST POSSIBLE PAIN
PAINLEVEL_OUTOF10: 7
PAINLEVEL_OUTOF10: 10 - WORST POSSIBLE PAIN
PAINLEVEL_OUTOF10: 4
PAINLEVEL_OUTOF10: 7
PAINLEVEL_OUTOF10: 7
PAINLEVEL_OUTOF10: 5 - MODERATE PAIN
PAINLEVEL_OUTOF10: 3
PAINLEVEL_OUTOF10: 10 - WORST POSSIBLE PAIN
PAINLEVEL_OUTOF10: 10 - WORST POSSIBLE PAIN

## 2024-08-16 ASSESSMENT — PAIN - FUNCTIONAL ASSESSMENT
PAIN_FUNCTIONAL_ASSESSMENT: 0-10

## 2024-08-16 ASSESSMENT — COLUMBIA-SUICIDE SEVERITY RATING SCALE - C-SSRS
2. HAVE YOU ACTUALLY HAD ANY THOUGHTS OF KILLING YOURSELF?: NO
6. HAVE YOU EVER DONE ANYTHING, STARTED TO DO ANYTHING, OR PREPARED TO DO ANYTHING TO END YOUR LIFE?: NO
1. IN THE PAST MONTH, HAVE YOU WISHED YOU WERE DEAD OR WISHED YOU COULD GO TO SLEEP AND NOT WAKE UP?: NO

## 2024-08-16 ASSESSMENT — PAIN DESCRIPTION - ORIENTATION
ORIENTATION: LEFT

## 2024-08-16 ASSESSMENT — PAIN SCALES - PAIN ASSESSMENT IN ADVANCED DEMENTIA (PAINAD): TOTALSCORE: MEDICATION (SEE MAR);REPOSITIONED;COLD APPLIED

## 2024-08-16 NOTE — ANESTHESIA PROCEDURE NOTES
Peripheral Block    Patient location during procedure: post-op  Start time: 8/16/2024 1:41 PM  End time: 8/16/2024 1:43 PM  Reason for block: post-op pain management  Staffing  Performed: attending   Authorized by: Panchito Hernandez DO    Performed by: Panchito Hernandez DO  Preanesthetic Checklist  Completed: patient identified, IV checked, site marked, risks and benefits discussed, surgical consent, monitors and equipment checked, pre-op evaluation and timeout performed   Timeout performed at: 8/16/2024 1:42 PM  Peripheral Block  Patient position: laying flat  Prep: ChloraPrep  Patient monitoring: heart rate and continuous pulse ox  Block type: adductor canal  Laterality: left  Injection technique: single-shot  Guidance: ultrasound guided  Local infiltration: lidocaine  Infiltration strength: 2 %  Dose: 5 mL  Needle  Needle gauge: 22 G  Needle length: 5 cm  Needle localization: ultrasound guidance     image stored in chart  Assessment  Injection assessment: negative aspiration for heme, no paresthesia on injection, incremental injection and local visualized surrounding nerve on ultrasound  Heart rate change: no  Slow fractionated injection: yes  Additional Notes  20mL of 0.5% ropivacaine

## 2024-08-16 NOTE — ANESTHESIA POSTPROCEDURE EVALUATION
"Patient: Lisa Guerrero \"Esthela\"    Procedure Summary       Date: 08/16/24 Room / Location: [facility] OR 10 / Virtual [facility] OR    Anesthesia Start: 1213 Anesthesia Stop: 1323    Procedure: Open Hardware Removal Patella (Left: Knee) Diagnosis:       Pain due to internal orthopedic prosthetic devices, implants and grafts, initial encounter (CMS-[facility])      (Pain due to internal orthopedic prosthetic devices, implants and grafts, initial encounter (CMS-[facility]) [T84.84XA])    Surgeons: Favian Mcghee MD Responsible Provider: Panchito Hernandez DO    Anesthesia Type: general ASA Status: 3            Anesthesia Type: general    Vitals Value Taken Time   /57 08/16/24 1345   Temp 36.3 °C (97.3 °F) 08/16/24 1320   Pulse 63 08/16/24 1348   Resp 20 08/16/24 1348   SpO2 100 % 08/16/24 1348   Vitals shown include unfiled device data.    Anesthesia Post Evaluation    Patient location during evaluation: PACU  Patient participation: complete - patient participated  Level of consciousness: awake and alert  Pain management: adequate  Multimodal analgesia pain management approach  Airway patency: patent  Cardiovascular status: acceptable  Respiratory status: acceptable  Hydration status: acceptable  Postoperative Nausea and Vomiting: none        No notable events documented.    "

## 2024-08-16 NOTE — DISCHARGE INSTRUCTIONS
Post-Operative Instructions - Knee Surgery  Dr. Favian Mcghee    Activity / Swelling:  Okay to weightbear as tolerated immediately.  A brace will be useful to prevent falls if pain / swelling but is not necessary if cautious with walker.  ICE PACK to assist with pain control   Packs should not be in direct contact with your skin  Use fairly continuously until you remove the post-op dressing  After dressing removed, use for up to 20 minutes every hour as needed for pain and swelling     Diet:  Gradually resume your normal diet as tolerated following surgery.  The evening of your surgical day, begin with liquids and/or light foods.  If you are feeling well enough the next morning, progress to your normal eating patterns    Dressing care /Showering / Hygiene:  Continue wound vac, per plastics.  Keep leg clean / dry    Medications:  Pain:  You will be given a prescription for pain medication after your surgery.  It should be taken as needed only and in many cases is NOT needed.  The goal is to discontinue it as quickly as possible.  DO NOT drive or operate heavy machinery while taking this medication as it will make you drowsy.  Do not take any additional pain medications.  This medication often continues Tylenol / acetaminophen and NO additional acetaminophen should be taken.      You may want to consider also taking over-the-counter stool softener and/or laxative (Colace, Senekot or Dulcolax). These medications should be taken as directed and only short term.    In addition to pain medication recommend over the counter Ibuprofen 600 mg every 6-8 hours.  Take with food and avoid if any issues with stomach/GI or kidneys.  Can also add over the counter medicine (Pepcid/omeprazole) to help protect the stomach.  Do NOT take any additional anti-inflammatories.  Do not take the ibuprofen if prior bleeding issues or history of ulcers.     Prevention of Blood Clots:  Resume xarelto POD 1    3) cellulitis - bactrim (per  patient tolerated in past) - if hives / itching discontinue and contact physician.     Physical therapy:  Will be discussed at first follow-up appointment    Driving: once off narcotics, off crutches, and good leg control is achieved.  Will be discussed at first visit.    Follow-up:         Generally 10-14 days post-op      Call with any concerns, especially calf pain, signs of infection (fever, drainage) or shortness of breath.  [phone]

## 2024-08-16 NOTE — ANESTHESIA PROCEDURE NOTES
Airway  Date/Time: 8/16/2024 12:27 PM  Urgency: elective    Airway not difficult    Staffing  Performed: CRNA   Authorized by: RODERICK Arguello    Performed by: RODERICK Arguello  Patient location during procedure: OR    Indications and Patient Condition  Indications for airway management: anesthesia  Spontaneous Ventilation: absent  Sedation level: deep  Preoxygenated: yes  Patient position: sniffing  MILS not maintained throughout  Mask difficulty assessment: 1 - vent by mask    Final Airway Details  Final airway type: endotracheal airway      Successful airway: ETT  Cuffed: yes   Successful intubation technique: direct laryngoscopy  Facilitating devices/methods: intubating stylet  Endotracheal tube insertion site: oral  Blade: Gary  Blade size: #3  ETT size (mm): 7.0  Cormack-Lehane Classification: grade I - full view of glottis  Placement verified by: chest auscultation and capnometry   Measured from: lips  ETT to lips (cm): 22  Number of attempts at approach: 1

## 2024-08-16 NOTE — ANESTHESIA PREPROCEDURE EVALUATION
"Patient: Lisa Guerrero \"Esthela\"    Procedure Information       Date/Time: 08/16/24 1245    Procedure: Open Hardware Removal Patella (Left: Knee) - GENERAL + REGIONAL BLOCK    Location: [facility] OR 10 / [facility] OR    Surgeons: Favian Mcghee MD            Relevant Problems   Cardiac   (+) Abnormal EKG   (+) Hypertension   (+) Mitral regurgitation      Pulmonary   (+) Chronic obstructive pulmonary disease (Multi)   (+) Dyspnea on minimal exertion   (+) HERNANDO (obstructive sleep apnea)   (+) Pneumonia      Neuro   (+) Chronic left-sided lumbar radiculopathy   (+) Depression   (+) Panic attacks      GI   (+) Esophageal dysphagia   (+) GERD (gastroesophageal reflux disease)      Liver   (+) Fatty liver      Endocrine   (+) Class 3 severe obesity in adult (Multi)      Hematology   (+) Factor V Leiden (Multi)   (+) Factor V deficiency (Multi)   (+) Iron deficiency anemia      Musculoskeletal   (+) Lumbar disc disease   (+) Osteoarthritis of left hip      GYN   (+) PCOD (polycystic ovarian disease)       Clinical information reviewed:   Tobacco  Allergies  Meds   Med Hx  Surg Hx  OB Status  Fam Hx  Soc   Hx        NPO Detail:  NPO/Void Status  Date of Last Liquid: 08/15/24  Time of Last Liquid: 2330  Date of Last Solid: 08/15/24  Time of Last Solid: 2100  Time of Last Void: 1131         Physical Exam    Airway  Mallampati: III  TM distance: >3 FB     Cardiovascular - normal exam  Rhythm: regular  Rate: normal     Dental - normal exam     Pulmonary - normal exam     Abdominal - normal exam  Abdomen: soft             Anesthesia Plan    History of general anesthesia?: yes  History of complications of general anesthesia?: no    ASA 3     general     The patient is not a current smoker.    intravenous induction   Postoperative administration of opioids is intended.  Anesthetic plan and risks discussed with patient.  Use of blood products discussed with patient who.    Plan discussed with CAA.      "

## 2024-08-16 NOTE — OP NOTE
"Operative Note     Date: 2024  OR Location: [facility] OR    Name: Lisa Guerrero \"Esthela\", : 1965, Age: 59 y.o., MRN: [id number], Sex: female    Diagnosis  Pre-op Diagnosis      * Pain due to internal orthopedic prosthetic devices, implants and grafts, initial encounter (CMS-[facility]) [T84.84XA] Post-op Diagnosis     * Pain due to internal orthopedic prosthetic devices, implants and grafts, initial encounter (CMS-[facility]) [T84.84XA]     Procedures  Open Hardware Removal Patella   - OR REMOVAL IMPLANT DEEP  With wound VAC placement, debridement skin edges -left knee    Surgeons      * Favian Mcghee - Primary    Resident/Fellow/Other Assistant:  Surgeons and Role:     * Estiven Goodrich PA-C - Assisting    Procedure Summary  Anesthesia: General  ASA: III  Anesthesia Staff: Anesthesiologist: Panchito Hernandez DO  CRNA: SÁNCHEZ ArguelloCRNA; RODERICK Woodard  Estimated Blood Loss: 10 mL  Intra-op Medications: Administrations occurring from 1245 to 1345 on 24:  * No intraprocedure medications in log *           Anesthesia Record               Intraprocedure I/O Totals       None           Specimen: No specimens collected     Staff:   Scrub Person: Shakila  Circulator: Ankit  Scrub Person: Annalisa  Scrub Person: Virgilio  Circulator: Khari           Implants:     Findings: see procedure details    Indications:     The patient was seen in the preoperative area. The risks, benefits, complications, treatment options, non-operative alternatives, expected recovery and outcomes were discussed with the patient. The possibilities of reaction to medication, pulmonary aspiration, injury to surrounding structures, bleeding, recurrent infection, the need for additional procedures, failure to diagnose a condition, and creating a complication requiring transfusion or operation were discussed with the patient. The patient concurred with the proposed plan, giving informed consent.  The site of surgery was properly " noted/marked if necessary per policy. The patient has been actively warmed in preoperative area. Preoperative antibiotics have been ordered and given within 1 hours of incision. Venous thrombosis prophylaxis have been ordered.    Procedure Details:   Patient's knee was examined there was some exposed hardware, risks and benefits of the procedure were discussed.  We discussed postoperative course, she did have some chronic mild cellulitis distally which she states occurs when she does not take her Lasix.  We discussed antibiotics postop and the appropriate medication.    Patient was transported the operating room underwent a general anesthesia.  The leg was prepped and draped using Betadine in the usual sterile fashion.  After prep drape and timeout the leg was elevated tourniquet was inflated.  We removed 9 screws from the patellar plate we did have to elevate some of the granulation tissue from the wound VAC.  Once all of the screws were removed we did debride the skin edges and subcutaneous tissue obtained healthy bleeding surface.  The patella was evaluated and did appear to be well-healed both visually as well as a fluoroscopic evaluation.    The wound was lavaged using pulsatile lavage as well as Irrisept irrigation.  A wound VAC was then placed and placed on low intermittent suction and did maintain a good vacuum seal.  Sterile dressings were placed over including knee immobilizer and Ace bandage.    The physician assistant was present for the entire case.  Given the nature of the procedure and disease process, a skilled surgical assistant was necessary for the case.  The assistant was necessary for retraction and helped directly facilitate completion of the surgery.  A certified scrub tech was at the back table managing instruments and supplies for the surgical procedure.      Complications:  None; patient tolerated the procedure well.    Disposition: PACU - hemodynamically stable.  Condition: stable          Favian Mcghee  Phone Number: [phone]

## 2024-08-18 ENCOUNTER — HOME CARE VISIT (OUTPATIENT)
Dept: HOME HEALTH SERVICES | Facility: HOME HEALTH | Age: 59
End: 2024-08-18
Payer: COMMERCIAL

## 2024-08-19 ENCOUNTER — APPOINTMENT (OUTPATIENT)
Dept: PRIMARY CARE | Facility: CLINIC | Age: 59
End: 2024-08-19
Payer: COMMERCIAL

## 2024-08-19 ENCOUNTER — HOME CARE VISIT (OUTPATIENT)
Dept: HOME HEALTH SERVICES | Facility: HOME HEALTH | Age: 59
End: 2024-08-19
Payer: COMMERCIAL

## 2024-08-19 ENCOUNTER — HOME CARE VISIT (OUTPATIENT)
Dept: HOME HEALTH SERVICES | Facility: HOME HEALTH | Age: 59
End: 2024-08-19

## 2024-08-19 DIAGNOSIS — I82.890 ACUTE EMBOLISM AND THROMBOSIS OF OTHER SPECIFIED VEINS: ICD-10-CM

## 2024-08-19 DIAGNOSIS — I50.22 CHRONIC SYSTOLIC (CONGESTIVE) HEART FAILURE (MULTI): ICD-10-CM

## 2024-08-19 PROCEDURE — G0299 HHS/HOSPICE OF RN EA 15 MIN: HCPCS

## 2024-08-19 RX ORDER — RIVAROXABAN 20 MG/1
20 TABLET, FILM COATED ORAL DAILY
Qty: 90 TABLET | Refills: 3 | Status: SHIPPED | OUTPATIENT
Start: 2024-08-19

## 2024-08-20 DIAGNOSIS — I42.9 CARDIOMYOPATHY, UNSPECIFIED (MULTI): ICD-10-CM

## 2024-08-20 RX ORDER — POTASSIUM CHLORIDE 750 MG/1
10 TABLET, EXTENDED RELEASE ORAL DAILY
Qty: 90 TABLET | Refills: 3 | Status: SHIPPED | OUTPATIENT
Start: 2024-08-20

## 2024-08-20 ASSESSMENT — ENCOUNTER SYMPTOMS
LOWER EXTREMITY EDEMA: 1
PAIN LOCATION - PAIN SEVERITY: 7/10
PERSON REPORTING PAIN: PATIENT
CHANGE IN APPETITE: UNCHANGED
PAIN: 1
BOWEL PATTERN NORMAL: 1
APPETITE LEVEL: GOOD
PAIN LOCATION: LEFT KNEE

## 2024-08-20 ASSESSMENT — ACTIVITIES OF DAILY LIVING (ADL)
ENTERING_EXITING_HOME: MODERATE ASSIST
OASIS_M1830: 03

## 2024-08-21 RX ORDER — METOPROLOL SUCCINATE 100 MG/1
100 TABLET, EXTENDED RELEASE ORAL DAILY
Qty: 90 TABLET | Refills: 3 | Status: SHIPPED | OUTPATIENT
Start: 2024-08-21

## 2024-08-22 ENCOUNTER — OFFICE VISIT (OUTPATIENT)
Dept: WOUND CARE | Facility: CLINIC | Age: 59
End: 2024-08-22
Payer: COMMERCIAL

## 2024-08-22 PROCEDURE — 11043 DBRDMT MUSC&/FSCA 1ST 20/<: CPT | Performed by: PLASTIC SURGERY

## 2024-08-22 PROCEDURE — 11043 DBRDMT MUSC&/FSCA 1ST 20/<: CPT

## 2024-08-22 PROCEDURE — 97605 NEG PRS WND THER DME<=50SQCM: CPT

## 2024-08-22 PROCEDURE — 97605 NEG PRS WND THER DME<=50SQCM: CPT | Performed by: PLASTIC SURGERY

## 2024-08-26 ENCOUNTER — HOME CARE VISIT (OUTPATIENT)
Dept: HOME HEALTH SERVICES | Facility: HOME HEALTH | Age: 59
End: 2024-08-26
Payer: COMMERCIAL

## 2024-08-26 VITALS — HEART RATE: 74 BPM | OXYGEN SATURATION: 97 % | RESPIRATION RATE: 18 BRPM | TEMPERATURE: 98.1 F

## 2024-08-26 PROCEDURE — G0299 HHS/HOSPICE OF RN EA 15 MIN: HCPCS

## 2024-08-26 ASSESSMENT — ENCOUNTER SYMPTOMS
PAIN LOCATION - PAIN SEVERITY: 7/10
PERSON REPORTING PAIN: PATIENT
APPETITE LEVEL: GOOD
PAIN LOCATION: LEFT KNEE
CHANGE IN APPETITE: UNCHANGED
PAIN: 1

## 2024-08-27 ENCOUNTER — APPOINTMENT (OUTPATIENT)
Dept: ORTHOPEDIC SURGERY | Facility: CLINIC | Age: 59
End: 2024-08-27
Payer: COMMERCIAL

## 2024-08-29 ENCOUNTER — OFFICE VISIT (OUTPATIENT)
Dept: WOUND CARE | Facility: CLINIC | Age: 59
End: 2024-08-29
Payer: COMMERCIAL

## 2024-08-29 PROCEDURE — 11043 DBRDMT MUSC&/FSCA 1ST 20/<: CPT

## 2024-08-29 PROCEDURE — 97605 NEG PRS WND THER DME<=50SQCM: CPT | Performed by: PLASTIC SURGERY

## 2024-08-29 PROCEDURE — 11043 DBRDMT MUSC&/FSCA 1ST 20/<: CPT | Performed by: PLASTIC SURGERY

## 2024-08-29 PROCEDURE — 97605 NEG PRS WND THER DME<=50SQCM: CPT

## 2024-09-02 ENCOUNTER — HOME CARE VISIT (OUTPATIENT)
Dept: HOME HEALTH SERVICES | Facility: HOME HEALTH | Age: 59
End: 2024-09-02
Payer: COMMERCIAL

## 2024-09-02 PROCEDURE — G0299 HHS/HOSPICE OF RN EA 15 MIN: HCPCS

## 2024-09-03 ENCOUNTER — OFFICE VISIT (OUTPATIENT)
Dept: ORTHOPEDIC SURGERY | Facility: CLINIC | Age: 59
End: 2024-09-03
Payer: COMMERCIAL

## 2024-09-03 DIAGNOSIS — G89.18 POST-OP PAIN: Primary | ICD-10-CM

## 2024-09-03 DIAGNOSIS — S82.002A CLOSED DISPLACED FRACTURE OF LEFT PATELLA, UNSPECIFIED FRACTURE MORPHOLOGY, INITIAL ENCOUNTER: ICD-10-CM

## 2024-09-03 PROCEDURE — 99211 OFF/OP EST MAY X REQ PHY/QHP: CPT | Performed by: ORTHOPAEDIC SURGERY

## 2024-09-03 ASSESSMENT — ENCOUNTER SYMPTOMS
PAIN LOCATION: LEFT KNEE
PERSON REPORTING PAIN: PATIENT
PAIN: 1
CHANGE IN APPETITE: UNCHANGED
PAIN LOCATION - PAIN SEVERITY: 6/10
APPETITE LEVEL: GOOD

## 2024-09-03 NOTE — PROGRESS NOTES
History of Present Illness:   Patient status post left knee hardware removal with debridement and wound VAC placement on 8/16/2024.  Overall she is doing very well and has very minimal pain at this point in time.  She does state that is painful when her dressing is removed and the wound VAC is changed however overall doing well.  She is continuing to see a wound specialist and they discussed options for a graft of skin.    Review of Systems   GENERAL: Negative for malaise, significant weight loss, fever  MUSCULOSKELETAL: see HPI  NEURO:  Negative    Physical Examination:  Left knee:  Skin healthy and intact surrounding incision site, wound VAC currently in place.  Previous wound image does reveal a border about 2-1/2 x 2-1/2 inches of granulated tissue, some evidence of exposed bone following hardware removal of plate.  Moderate ecchymosis distally with underlying vascular issues.  Mild gross swelling.  No varus malalignment  No valgus malalignment   Mild effusion  ROM:  0-95     Neurovascular exam normal distally    Imaging:  Deferred    Assessment:   Patient status post hardware removal left patella status post patella fracture and ORIF, status post debridement, doing well.  Wound VAC placement.    Plan:  We discussed overall restrictions for the patient going forward and reviewed weightbearing as well as range of motion restrictions.  At this point she is weightbearing as tolerated, okay for range of motion as tolerated pending restrictions from wound care specialist regarding wound VAC placement.  We will defer to wound care specialist going forward for overall healing as we do feel from the standpoint of orthopedic she has done very well and healed her patella.  Discussed activities to avoid as well as fall risk.    Estiven Goodrich PA-C    In a face to face encounter, I evaluated the patient and performed a physical examination, discussed pertinent diagnostic studies if indicated and discussed diagnosis and  management strategies with both the patient and physician assistant / nurse practitioner.  I reviewed the PA/NP's note and agree with the documented findings and plan of care.        Favian Mcghee MD

## 2024-09-04 ENCOUNTER — PATIENT OUTREACH (OUTPATIENT)
Dept: PRIMARY CARE | Facility: CLINIC | Age: 59
End: 2024-09-04
Payer: COMMERCIAL

## 2024-09-05 ENCOUNTER — OFFICE VISIT (OUTPATIENT)
Dept: WOUND CARE | Facility: CLINIC | Age: 59
End: 2024-09-05
Payer: COMMERCIAL

## 2024-09-05 PROCEDURE — 11043 DBRDMT MUSC&/FSCA 1ST 20/<: CPT

## 2024-09-05 PROCEDURE — 11043 DBRDMT MUSC&/FSCA 1ST 20/<: CPT | Performed by: PLASTIC SURGERY

## 2024-09-09 ENCOUNTER — HOME CARE VISIT (OUTPATIENT)
Dept: HOME HEALTH SERVICES | Facility: HOME HEALTH | Age: 59
End: 2024-09-09
Payer: COMMERCIAL

## 2024-09-09 VITALS
OXYGEN SATURATION: 99 % | DIASTOLIC BLOOD PRESSURE: 72 MMHG | HEART RATE: 73 BPM | TEMPERATURE: 98.1 F | SYSTOLIC BLOOD PRESSURE: 124 MMHG

## 2024-09-09 PROCEDURE — G0299 HHS/HOSPICE OF RN EA 15 MIN: HCPCS

## 2024-09-09 ASSESSMENT — ACTIVITIES OF DAILY LIVING (ADL)
CURRENT_FUNCTION: STAND BY ASSIST
AMBULATION ASSISTANCE: STAND BY ASSIST

## 2024-09-09 ASSESSMENT — ENCOUNTER SYMPTOMS
PERSON REPORTING PAIN: PATIENT
APPETITE LEVEL: GOOD
LOWER EXTREMITY EDEMA: 1
PAIN LOCATION - PAIN SEVERITY: 3/10
CHANGE IN APPETITE: UNCHANGED
PAIN: 1
LIMITED RANGE OF MOTION: 1
PAIN LOCATION: LEFT KNEE

## 2024-09-12 ENCOUNTER — OFFICE VISIT (OUTPATIENT)
Dept: WOUND CARE | Facility: CLINIC | Age: 59
End: 2024-09-12
Payer: COMMERCIAL

## 2024-09-12 ENCOUNTER — LAB REQUISITION (OUTPATIENT)
Dept: LAB | Facility: HOSPITAL | Age: 59
End: 2024-09-12
Payer: COMMERCIAL

## 2024-09-12 DIAGNOSIS — T81.31XA DISRUPTION OF EXTERNAL OPERATION (SURGICAL) WOUND, NOT ELSEWHERE CLASSIFIED, INITIAL ENCOUNTER: ICD-10-CM

## 2024-09-12 DIAGNOSIS — S81.002A UNSPECIFIED OPEN WOUND, LEFT KNEE, INITIAL ENCOUNTER: ICD-10-CM

## 2024-09-12 DIAGNOSIS — L98.493 NON-PRESSURE CHRONIC ULCER OF SKIN OF OTHER SITES WITH NECROSIS OF MUSCLE: ICD-10-CM

## 2024-09-12 PROCEDURE — 87186 SC STD MICRODIL/AGAR DIL: CPT

## 2024-09-12 PROCEDURE — 97605 NEG PRS WND THER DME<=50SQCM: CPT

## 2024-09-12 PROCEDURE — 97605 NEG PRS WND THER DME<=50SQCM: CPT | Performed by: PLASTIC SURGERY

## 2024-09-12 PROCEDURE — 11043 DBRDMT MUSC&/FSCA 1ST 20/<: CPT

## 2024-09-12 PROCEDURE — 87070 CULTURE OTHR SPECIMN AEROBIC: CPT

## 2024-09-12 PROCEDURE — 11043 DBRDMT MUSC&/FSCA 1ST 20/<: CPT | Performed by: PLASTIC SURGERY

## 2024-09-12 PROCEDURE — 87077 CULTURE AEROBIC IDENTIFY: CPT

## 2024-09-12 PROCEDURE — 87205 SMEAR GRAM STAIN: CPT

## 2024-09-12 PROCEDURE — 87075 CULTR BACTERIA EXCEPT BLOOD: CPT

## 2024-09-12 PROCEDURE — 87185 SC STD ENZYME DETCJ PER NZM: CPT

## 2024-09-15 LAB
B-LACTAMASE ORGANISM ISLT: POSITIVE
BACTERIA SPEC CULT: ABNORMAL
BACTERIA SPEC CULT: ABNORMAL
GRAM STN SPEC: ABNORMAL
GRAM STN SPEC: ABNORMAL

## 2024-09-16 ENCOUNTER — HOME CARE VISIT (OUTPATIENT)
Dept: HOME HEALTH SERVICES | Facility: HOME HEALTH | Age: 59
End: 2024-09-16
Payer: COMMERCIAL

## 2024-09-16 VITALS — HEART RATE: 78 BPM | OXYGEN SATURATION: 97 % | RESPIRATION RATE: 18 BRPM | TEMPERATURE: 98 F

## 2024-09-16 DIAGNOSIS — F32.A DEPRESSION, UNSPECIFIED: ICD-10-CM

## 2024-09-16 PROCEDURE — G0299 HHS/HOSPICE OF RN EA 15 MIN: HCPCS

## 2024-09-16 ASSESSMENT — ENCOUNTER SYMPTOMS
PAIN LOCATION - PAIN SEVERITY: 7/10
PAIN LOCATION: LEFT KNEE
PERSON REPORTING PAIN: PATIENT
PAIN: 1
APPETITE LEVEL: GOOD
CHANGE IN APPETITE: UNCHANGED

## 2024-09-17 ENCOUNTER — APPOINTMENT (OUTPATIENT)
Dept: CARDIOLOGY | Facility: CLINIC | Age: 59
End: 2024-09-17
Payer: COMMERCIAL

## 2024-09-17 VITALS
HEART RATE: 83 BPM | OXYGEN SATURATION: 96 % | SYSTOLIC BLOOD PRESSURE: 128 MMHG | BODY MASS INDEX: 40.32 KG/M2 | HEIGHT: 65 IN | DIASTOLIC BLOOD PRESSURE: 70 MMHG | WEIGHT: 242 LBS

## 2024-09-17 DIAGNOSIS — I50.22 CHRONIC SYSTOLIC HEART FAILURE: Primary | ICD-10-CM

## 2024-09-17 DIAGNOSIS — M25.569 KNEE PAIN, UNSPECIFIED CHRONICITY, UNSPECIFIED LATERALITY: ICD-10-CM

## 2024-09-17 DIAGNOSIS — G47.30 SLEEP APNEA, UNSPECIFIED: ICD-10-CM

## 2024-09-17 DIAGNOSIS — D68.2 FACTOR V DEFICIENCY (MULTI): ICD-10-CM

## 2024-09-17 PROCEDURE — 3078F DIAST BP <80 MM HG: CPT | Performed by: INTERNAL MEDICINE

## 2024-09-17 PROCEDURE — 99214 OFFICE O/P EST MOD 30 MIN: CPT | Performed by: INTERNAL MEDICINE

## 2024-09-17 PROCEDURE — 3074F SYST BP LT 130 MM HG: CPT | Performed by: INTERNAL MEDICINE

## 2024-09-17 PROCEDURE — 3008F BODY MASS INDEX DOCD: CPT | Performed by: INTERNAL MEDICINE

## 2024-09-17 RX ORDER — GABAPENTIN 300 MG/1
300 CAPSULE ORAL 2 TIMES DAILY
Qty: 60 CAPSULE | Refills: 0 | Status: SHIPPED | OUTPATIENT
Start: 2024-09-17 | End: 2024-10-17

## 2024-09-17 RX ORDER — SERTRALINE HYDROCHLORIDE 100 MG/1
100 TABLET, FILM COATED ORAL DAILY
Qty: 90 TABLET | Refills: 1 | Status: SHIPPED | OUTPATIENT
Start: 2024-09-17

## 2024-09-17 ASSESSMENT — ENCOUNTER SYMPTOMS
DEPRESSION: 1
LOSS OF SENSATION IN FEET: 0
OCCASIONAL FEELINGS OF UNSTEADINESS: 1

## 2024-09-17 ASSESSMENT — PAIN SCALES - GENERAL: PAINLEVEL: 0-NO PAIN

## 2024-09-17 NOTE — PROGRESS NOTES
"Chief Complaint:   No chief complaint on file.     History Of Present Illness:    Lisa Guerrero \"Nishant" is a 59 y.o. 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     Her left knee wound is finally almost healed.  She is still wearing the wound VAC.  She still has pain in the left knee when the suction turns off.  The left lower leg has remained relatively free from swelling.    She tells me that most days she takes the Entresto twice a day but there are times where she forgets to take the evening dose.     She was first diagnosed with a nonischemic dilated cardiomyopathy in January 2018. She was admitted to the hospital and underwent catheterization which reportedly demonstrated no significant coronary disease. EF was 20%. She was initiated on heart failure regimen and was asked to wear a LifeVest. LV function improved with aggressive medical therapy and no ICD was required. She followed with  for some time before he retired.     She was diagnosed with factor V Leiden deficiency and is remained on Xarelto 20 mg ever since. She has not had a recurrent thromboembolic events since that time. She denies any bleeding issues with Xarelto.     Her daughter, Kash, used to work in our office.      Right heart catheterization 7/5/2023: RA 38, RV 73/35, wedge 52, PA 72/33 (50). CO/CI 3.4/1.5     Echocardiogram 6/22/2023: EF 40 to 45%. Moderately enlarged RV with moderately reduced systolic function. Moderate left atrial enlargement. Moderate MR and TR. RVSP 44 mmHg.     Echocardiogram 12/12/2022: Dilated left ventricle (LVIDD 7.3 cm) with EF 55 to 60%. Grade 1 diastolic dysfunction. Moderate left atrial enlargement. Moderate MR.      Echocardiogram 7/21/2018: EF 30 to 40% with grade 3 " "diastolic dysfunction. Severe left atrial enlargement. Severe MR. Moderate TR.      Allergies:  Ceftriaxone, Codeine, Penicillins, Percocet [oxycodone-acetaminophen], Sacubitril-valsartan, Sulfamethoxazole-trimethoprim, Hydrogen peroxide, and Latex    Outpatient Medications:  Current Outpatient Medications   Medication Instructions    albuterol 90 mcg/actuation inhaler 1-2 puffs, inhalation, Every 4 hours PRN    ammonium lactate (Lac-Hydrin) 12 % lotion Topical, As needed    bumetanide (BUMEX) 2 mg, oral, 2 times daily    cyclobenzaprine (FLEXERIL) 10 mg, oral, 3 times daily PRN    Entresto 24-26 mg tablet 1 tablet, oral, 2 times daily    fluticasone (Flonase) 50 mcg/actuation nasal spray 2 sprays, nasal, Daily    magnesium oxide (MAG-OX) 400 mg, oral, Daily    metoprolol succinate XL (TOPROL-XL) 100 mg, oral, Daily    potassium chloride CR 10 mEq ER tablet 10 mEq, oral, Daily    sertraline (ZOLOFT) 100 mg, oral, Daily    TURMERIC ORAL 1 capsule, oral, Daily    Xarelto 20 mg, oral, Daily       Last Recorded Vitals:  Visit Vitals  /70 (BP Location: Left arm, Patient Position: Sitting)   Pulse 83   Ht 1.651 m (5' 5\")   Wt 110 kg (242 lb)   SpO2 96%   BMI 40.27 kg/m²   OB Status Postmenopausal   Smoking Status Every Day   BSA 2.25 m²      LASTWT(3):   Wt Readings from Last 3 Encounters:   09/17/24 110 kg (242 lb)   08/16/24 112 kg (248 lb)   06/14/24 112 kg (246 lb 14.6 oz)       Physical Exam:  In general: alert and in no acute distress.   HEENT: Carotid upstrokes normal with no bruits. JVP is normal.   Pulmonary: Clear to auscultation bilaterally.  Cardiovascular: S1, S2, regular. No appreciable murmurs, rubs or gallops.   Lower extremities: Warm. 2+ distal pulses. No edema.     Last Labs:  CBC -  Recent Labs     07/15/24  1015 07/08/24  1600 07/04/24  1000   WBC 7.8 7.3 7.9   HGB 15.0 14.4 14.6   HCT 47.3* 45.6 47.2*    222 228   MCV 88 89 89       CMP -  Recent Labs     07/15/24  1015 07/08/24  1600 " 06/26/24  1100 05/04/24  0537 04/22/24  1522 07/31/23  1033 07/18/23  0354    139 143   < > 138 139 CANCELED   K 4.4 4.3 4.7   < > 4.2 4.1 CANCELED    107 104   < > 104 104 CANCELED   CO2 24 23 23   < > 22 24 CANCELED   ANIONGAP 14 13 21*   < > 16 15 CANCELED   BUN 38* 20 20   < > 20 37* CANCELED   CREATININE 1.31* 0.91 0.78   < > 0.87 1.09* CANCELED   EGFR 46* 72 87   < > 76  --   --    MG  --   --   --   --  1.76 1.47* CANCELED    < > = values in this interval not displayed.     Recent Labs     06/14/24  1144 04/22/24  1522 07/18/23  0354 07/12/23  1957 07/12/23  0315   ALBUMIN 3.9 4.2 CANCELED   < > 3.2*   ALKPHOS 69 77  --   --  76   ALT 7 10  --   --  8   AST 5* 6*  --   --  7*   BILITOT 0.3 0.5  --   --  2.1*    < > = values in this interval not displayed.       LIPID PANEL -   Recent Labs     07/03/23 2009 12/12/22  1242   CHOL CANCELED 156   LDLF CANCELED 107*   HDL CANCELED 33.1*   TRIG CANCELED 81       Recent Labs     07/31/23  1033 07/03/23 2009 07/03/23 2008 06/21/23  1437 05/22/23  1714   BNP 1,052* CANCELED 1,382*   < >  --    HGBA1C  --  CANCELED 5.7*  --  7.1*    < > = values in this interval not displayed.           Assessment/Plan   1) chronic systolic heart failure: Would encourage her to try taking the Entresto twice a day.  Will get blood work as her last creatinine was mildly elevated but this was the time surrounding her surgery.    I would also like another echocardiogram to reassess LV function.     2) factor V Leiden deficiency with history of jugular vein thrombosis: Continue Xarelto.     3) HERNANDO: Sleep study ordered in the past.     4) hypomagnesemia: Magnesium level ordered.     5) follow-up: 6 months or sooner if needed         Nathan Feldman MD

## 2024-09-19 ENCOUNTER — OFFICE VISIT (OUTPATIENT)
Dept: WOUND CARE | Facility: CLINIC | Age: 59
End: 2024-09-19
Payer: COMMERCIAL

## 2024-09-19 PROCEDURE — 97605 NEG PRS WND THER DME<=50SQCM: CPT | Performed by: PLASTIC SURGERY

## 2024-09-19 PROCEDURE — 11043 DBRDMT MUSC&/FSCA 1ST 20/<: CPT

## 2024-09-19 PROCEDURE — 97605 NEG PRS WND THER DME<=50SQCM: CPT

## 2024-09-19 PROCEDURE — 11043 DBRDMT MUSC&/FSCA 1ST 20/<: CPT | Performed by: PLASTIC SURGERY

## 2024-09-23 ENCOUNTER — HOME CARE VISIT (OUTPATIENT)
Dept: HOME HEALTH SERVICES | Facility: HOME HEALTH | Age: 59
End: 2024-09-23
Payer: COMMERCIAL

## 2024-09-23 VITALS — RESPIRATION RATE: 20 BRPM | TEMPERATURE: 98.3 F | HEART RATE: 80 BPM | OXYGEN SATURATION: 96 %

## 2024-09-23 PROCEDURE — G0299 HHS/HOSPICE OF RN EA 15 MIN: HCPCS

## 2024-09-23 ASSESSMENT — ENCOUNTER SYMPTOMS
PAIN LOCATION: LEFT KNEE
PAIN: 1
CHANGE IN APPETITE: UNCHANGED
PAIN LOCATION - PAIN SEVERITY: 7/10
APPETITE LEVEL: GOOD
PERSON REPORTING PAIN: PATIENT

## 2024-09-26 ENCOUNTER — OFFICE VISIT (OUTPATIENT)
Dept: WOUND CARE | Facility: CLINIC | Age: 59
End: 2024-09-26
Payer: COMMERCIAL

## 2024-09-26 PROCEDURE — 97605 NEG PRS WND THER DME<=50SQCM: CPT | Performed by: PLASTIC SURGERY

## 2024-09-26 PROCEDURE — 11043 DBRDMT MUSC&/FSCA 1ST 20/<: CPT

## 2024-09-26 PROCEDURE — 97605 NEG PRS WND THER DME<=50SQCM: CPT

## 2024-09-26 PROCEDURE — 11043 DBRDMT MUSC&/FSCA 1ST 20/<: CPT | Performed by: PLASTIC SURGERY

## 2024-09-30 ENCOUNTER — HOME CARE VISIT (OUTPATIENT)
Dept: HOME HEALTH SERVICES | Facility: HOME HEALTH | Age: 59
End: 2024-09-30
Payer: COMMERCIAL

## 2024-09-30 VITALS — OXYGEN SATURATION: 98 % | HEART RATE: 88 BPM | TEMPERATURE: 97.7 F | RESPIRATION RATE: 18 BRPM

## 2024-09-30 PROCEDURE — G0299 HHS/HOSPICE OF RN EA 15 MIN: HCPCS

## 2024-10-01 ASSESSMENT — ENCOUNTER SYMPTOMS
APPETITE LEVEL: GOOD
PERSON REPORTING PAIN: PATIENT
DENIES PAIN: 1
CHANGE IN APPETITE: UNCHANGED

## 2024-10-03 ENCOUNTER — OFFICE VISIT (OUTPATIENT)
Dept: WOUND CARE | Facility: CLINIC | Age: 59
End: 2024-10-03
Payer: COMMERCIAL

## 2024-10-03 PROCEDURE — 97605 NEG PRS WND THER DME<=50SQCM: CPT

## 2024-10-03 PROCEDURE — 11043 DBRDMT MUSC&/FSCA 1ST 20/<: CPT | Performed by: PLASTIC SURGERY

## 2024-10-03 PROCEDURE — 11043 DBRDMT MUSC&/FSCA 1ST 20/<: CPT

## 2024-10-03 PROCEDURE — 97605 NEG PRS WND THER DME<=50SQCM: CPT | Performed by: PLASTIC SURGERY

## 2024-10-07 ENCOUNTER — HOME CARE VISIT (OUTPATIENT)
Dept: HOME HEALTH SERVICES | Facility: HOME HEALTH | Age: 59
End: 2024-10-07
Payer: COMMERCIAL

## 2024-10-07 PROCEDURE — G0299 HHS/HOSPICE OF RN EA 15 MIN: HCPCS

## 2024-10-07 ASSESSMENT — ENCOUNTER SYMPTOMS
APPETITE LEVEL: GOOD
CHANGE IN APPETITE: UNCHANGED
DENIES PAIN: 1
PERSON REPORTING PAIN: PATIENT

## 2024-10-10 ENCOUNTER — OFFICE VISIT (OUTPATIENT)
Dept: WOUND CARE | Facility: CLINIC | Age: 59
End: 2024-10-10
Payer: COMMERCIAL

## 2024-10-10 ENCOUNTER — LAB REQUISITION (OUTPATIENT)
Dept: LAB | Facility: HOSPITAL | Age: 59
End: 2024-10-10
Payer: COMMERCIAL

## 2024-10-10 DIAGNOSIS — L98.493 NON-PRESSURE CHRONIC ULCER OF SKIN OF OTHER SITES WITH NECROSIS OF MUSCLE: ICD-10-CM

## 2024-10-10 DIAGNOSIS — S81.002S: ICD-10-CM

## 2024-10-10 DIAGNOSIS — T81.31XA DISRUPTION OF EXTERNAL OPERATION (SURGICAL) WOUND, NOT ELSEWHERE CLASSIFIED, INITIAL ENCOUNTER: ICD-10-CM

## 2024-10-10 PROCEDURE — 87185 SC STD ENZYME DETCJ PER NZM: CPT

## 2024-10-10 PROCEDURE — 87077 CULTURE AEROBIC IDENTIFY: CPT

## 2024-10-10 PROCEDURE — 87205 SMEAR GRAM STAIN: CPT

## 2024-10-10 PROCEDURE — 87070 CULTURE OTHR SPECIMN AEROBIC: CPT

## 2024-10-10 PROCEDURE — 11043 DBRDMT MUSC&/FSCA 1ST 20/<: CPT

## 2024-10-10 PROCEDURE — 11043 DBRDMT MUSC&/FSCA 1ST 20/<: CPT | Performed by: PLASTIC SURGERY

## 2024-10-10 PROCEDURE — 87075 CULTR BACTERIA EXCEPT BLOOD: CPT

## 2024-10-10 PROCEDURE — 87181 SC STD AGAR DILUTION PER AGT: CPT

## 2024-10-14 ENCOUNTER — HOME CARE VISIT (OUTPATIENT)
Dept: HOME HEALTH SERVICES | Facility: HOME HEALTH | Age: 59
End: 2024-10-14
Payer: COMMERCIAL

## 2024-10-14 PROCEDURE — G0299 HHS/HOSPICE OF RN EA 15 MIN: HCPCS

## 2024-10-15 ENCOUNTER — OFFICE VISIT (OUTPATIENT)
Dept: ORTHOPEDIC SURGERY | Facility: CLINIC | Age: 59
End: 2024-10-15
Payer: COMMERCIAL

## 2024-10-15 DIAGNOSIS — S82.002A CLOSED DISPLACED FRACTURE OF LEFT PATELLA, UNSPECIFIED FRACTURE MORPHOLOGY, INITIAL ENCOUNTER: ICD-10-CM

## 2024-10-15 PROCEDURE — 99211 OFF/OP EST MAY X REQ PHY/QHP: CPT | Performed by: ORTHOPAEDIC SURGERY

## 2024-10-15 NOTE — PROGRESS NOTES
History of Present Illness:   Patient returns today for evaluation of her left knee.  She has minimal pain she states is only a very small aspect of the wound that still open she is hoping to be done with wound care on Thursday.  She was unable to attend therapy as she was having care at her home.    Review of Systems   GENERAL: Negative for malaise, significant weight loss, fever  MUSCULOSKELETAL: see HPI  NEURO:  Negative    Physical Examination:  Left knee:  Significant swelling distally with chronic venous insufficiency  Wound VAC in place  Full extension limited flexion of the knee  Dorsiflexion plantarflexion intact    Imaging:  Deferred    Assessment:   Patient status post ORIF patella with hardware removal and subsequent wound complications    Plan:  Discussed the importance of formal physical therapy no restriction from a range of motion standpoint.  We are concerned about significant loss of motion.  Patient will try to get in as soon as possible when she is able to.

## 2024-10-16 ASSESSMENT — ENCOUNTER SYMPTOMS
PERSON REPORTING PAIN: PATIENT
DYSPNEA ACTIVITY LEVEL: AFTER AMBULATING MORE THAN 20 FT
SHORTNESS OF BREATH: 1
PAIN LOCATION - PAIN SEVERITY: 5/10
COUGH: 1
CHANGE IN APPETITE: UNCHANGED
PAIN: 1
PAIN LOCATION: GENERALIZED
APPETITE LEVEL: GOOD
LOWER EXTREMITY EDEMA: 1
SKIN LESIONS: 1

## 2024-10-16 ASSESSMENT — ACTIVITIES OF DAILY LIVING (ADL)
ENTERING_EXITING_HOME: MODERATE ASSIST
OASIS_M1830: 03

## 2024-10-17 ENCOUNTER — OFFICE VISIT (OUTPATIENT)
Dept: WOUND CARE | Facility: CLINIC | Age: 59
End: 2024-10-17
Payer: COMMERCIAL

## 2024-10-17 PROCEDURE — 11044 DBRDMT BONE 1ST 20 SQ CM/<: CPT | Performed by: PLASTIC SURGERY

## 2024-10-17 PROCEDURE — 11044 DBRDMT BONE 1ST 20 SQ CM/<: CPT

## 2024-10-22 ENCOUNTER — HOME CARE VISIT (OUTPATIENT)
Dept: HOME HEALTH SERVICES | Facility: HOME HEALTH | Age: 59
End: 2024-10-22
Payer: COMMERCIAL

## 2024-10-22 VITALS
SYSTOLIC BLOOD PRESSURE: 108 MMHG | RESPIRATION RATE: 18 BRPM | TEMPERATURE: 99.3 F | OXYGEN SATURATION: 97 % | HEART RATE: 74 BPM | DIASTOLIC BLOOD PRESSURE: 62 MMHG

## 2024-10-22 PROCEDURE — G0299 HHS/HOSPICE OF RN EA 15 MIN: HCPCS

## 2024-10-22 ASSESSMENT — ENCOUNTER SYMPTOMS
PAIN SEVERITY GOAL: 0/10
LOWEST PAIN SEVERITY IN PAST 24 HOURS: 1/10
HIGHEST PAIN SEVERITY IN PAST 24 HOURS: 5/10
APPETITE LEVEL: GOOD
PAIN LOCATION: LEFT KNEE
CHANGE IN APPETITE: UNCHANGED
PAIN: 1
PERSON REPORTING PAIN: PATIENT

## 2024-10-24 ENCOUNTER — OFFICE VISIT (OUTPATIENT)
Dept: WOUND CARE | Facility: CLINIC | Age: 59
End: 2024-10-24
Payer: COMMERCIAL

## 2024-10-24 PROCEDURE — 97605 NEG PRS WND THER DME<=50SQCM: CPT

## 2024-10-24 PROCEDURE — 11043 DBRDMT MUSC&/FSCA 1ST 20/<: CPT

## 2024-10-24 PROCEDURE — 97605 NEG PRS WND THER DME<=50SQCM: CPT | Performed by: PLASTIC SURGERY

## 2024-10-24 PROCEDURE — 11043 DBRDMT MUSC&/FSCA 1ST 20/<: CPT | Performed by: PLASTIC SURGERY

## 2024-10-28 ENCOUNTER — HOME CARE VISIT (OUTPATIENT)
Dept: HOME HEALTH SERVICES | Facility: HOME HEALTH | Age: 59
End: 2024-10-28
Payer: COMMERCIAL

## 2024-10-31 ENCOUNTER — OFFICE VISIT (OUTPATIENT)
Dept: WOUND CARE | Facility: CLINIC | Age: 59
End: 2024-10-31
Payer: COMMERCIAL

## 2024-10-31 PROCEDURE — 11043 DBRDMT MUSC&/FSCA 1ST 20/<: CPT

## 2024-10-31 PROCEDURE — 97605 NEG PRS WND THER DME<=50SQCM: CPT

## 2024-10-31 PROCEDURE — 11043 DBRDMT MUSC&/FSCA 1ST 20/<: CPT | Performed by: PLASTIC SURGERY

## 2024-10-31 PROCEDURE — 97605 NEG PRS WND THER DME<=50SQCM: CPT | Performed by: PLASTIC SURGERY

## 2024-11-04 ENCOUNTER — HOME CARE VISIT (OUTPATIENT)
Dept: HOME HEALTH SERVICES | Facility: HOME HEALTH | Age: 59
End: 2024-11-04
Payer: COMMERCIAL

## 2024-11-04 VITALS
SYSTOLIC BLOOD PRESSURE: 118 MMHG | OXYGEN SATURATION: 98 % | HEART RATE: 75 BPM | TEMPERATURE: 96.8 F | DIASTOLIC BLOOD PRESSURE: 80 MMHG | RESPIRATION RATE: 18 BRPM

## 2024-11-04 PROCEDURE — G0300 HHS/HOSPICE OF LPN EA 15 MIN: HCPCS

## 2024-11-04 ASSESSMENT — ENCOUNTER SYMPTOMS
LAST BOWEL MOVEMENT: 67148
CHANGE IN APPETITE: UNCHANGED
DENIES PAIN: 1
APPETITE LEVEL: GOOD
PERSON REPORTING PAIN: PATIENT

## 2024-11-07 ENCOUNTER — OFFICE VISIT (OUTPATIENT)
Dept: WOUND CARE | Facility: CLINIC | Age: 59
End: 2024-11-07
Payer: COMMERCIAL

## 2024-11-07 ENCOUNTER — LAB REQUISITION (OUTPATIENT)
Dept: LAB | Facility: HOSPITAL | Age: 59
End: 2024-11-07
Payer: COMMERCIAL

## 2024-11-07 ENCOUNTER — APPOINTMENT (OUTPATIENT)
Dept: CARDIOLOGY | Facility: HOSPITAL | Age: 59
End: 2024-11-07
Payer: COMMERCIAL

## 2024-11-07 DIAGNOSIS — L98.493 NON-PRESSURE CHRONIC ULCER OF SKIN OF OTHER SITES WITH NECROSIS OF MUSCLE: ICD-10-CM

## 2024-11-07 DIAGNOSIS — S81.002S: ICD-10-CM

## 2024-11-07 PROCEDURE — 87186 SC STD MICRODIL/AGAR DIL: CPT

## 2024-11-07 PROCEDURE — 87185 SC STD ENZYME DETCJ PER NZM: CPT

## 2024-11-07 PROCEDURE — 87075 CULTR BACTERIA EXCEPT BLOOD: CPT

## 2024-11-07 PROCEDURE — 97605 NEG PRS WND THER DME<=50SQCM: CPT | Performed by: PLASTIC SURGERY

## 2024-11-07 PROCEDURE — 97605 NEG PRS WND THER DME<=50SQCM: CPT

## 2024-11-07 PROCEDURE — 87070 CULTURE OTHR SPECIMN AEROBIC: CPT

## 2024-11-07 PROCEDURE — 11043 DBRDMT MUSC&/FSCA 1ST 20/<: CPT | Performed by: PLASTIC SURGERY

## 2024-11-07 PROCEDURE — 87205 SMEAR GRAM STAIN: CPT

## 2024-11-07 PROCEDURE — 87077 CULTURE AEROBIC IDENTIFY: CPT

## 2024-11-07 PROCEDURE — 11043 DBRDMT MUSC&/FSCA 1ST 20/<: CPT

## 2024-11-11 ENCOUNTER — HOME CARE VISIT (OUTPATIENT)
Dept: HOME HEALTH SERVICES | Facility: HOME HEALTH | Age: 59
End: 2024-11-11
Payer: COMMERCIAL

## 2024-11-11 VITALS — TEMPERATURE: 97.3 F | HEART RATE: 76 BPM | OXYGEN SATURATION: 98 %

## 2024-11-11 PROCEDURE — G0299 HHS/HOSPICE OF RN EA 15 MIN: HCPCS

## 2024-11-12 LAB
B-LACTAMASE ORGANISM ISLT: POSITIVE
BACTERIA SPEC CULT: ABNORMAL
GRAM STN SPEC: ABNORMAL
GRAM STN SPEC: ABNORMAL

## 2024-11-12 ASSESSMENT — ENCOUNTER SYMPTOMS
PAIN SEVERITY GOAL: 0/10
LOWEST PAIN SEVERITY IN PAST 24 HOURS: 2/10
APPETITE LEVEL: GOOD
PERSON REPORTING PAIN: PATIENT
LOWER EXTREMITY EDEMA: 1
PAIN: 1
HIGHEST PAIN SEVERITY IN PAST 24 HOURS: 5/10
PAIN LOCATION: LEFT KNEE
CHANGE IN APPETITE: UNCHANGED

## 2024-11-14 ENCOUNTER — OFFICE VISIT (OUTPATIENT)
Dept: WOUND CARE | Facility: CLINIC | Age: 59
End: 2024-11-14
Payer: COMMERCIAL

## 2024-11-14 PROCEDURE — 11042 DBRDMT SUBQ TIS 1ST 20SQCM/<: CPT | Performed by: PLASTIC SURGERY

## 2024-11-14 PROCEDURE — 11042 DBRDMT SUBQ TIS 1ST 20SQCM/<: CPT

## 2024-11-15 ENCOUNTER — TELEPHONE (OUTPATIENT)
Dept: CARDIOLOGY | Facility: CLINIC | Age: 59
End: 2024-11-15
Payer: COMMERCIAL

## 2024-11-15 NOTE — TELEPHONE ENCOUNTER
Patient's DME company call office stating that patient told them you discontinued the home oxygen order. They want to know if you're OK with the o2 being discontinued, or do you want comprehensive testing done? Thanks.

## 2024-11-18 ENCOUNTER — HOME CARE VISIT (OUTPATIENT)
Dept: HOME HEALTH SERVICES | Facility: HOME HEALTH | Age: 59
End: 2024-11-18
Payer: COMMERCIAL

## 2024-11-18 NOTE — TELEPHONE ENCOUNTER
Per Dr. Feldman, called [facility], and instructed them to defer to PCP. Number for Nelia Anders CNP given.

## 2024-11-21 ENCOUNTER — OFFICE VISIT (OUTPATIENT)
Dept: WOUND CARE | Facility: CLINIC | Age: 59
End: 2024-11-21
Payer: COMMERCIAL

## 2024-11-21 PROCEDURE — 11043 DBRDMT MUSC&/FSCA 1ST 20/<: CPT

## 2024-11-21 PROCEDURE — 11043 DBRDMT MUSC&/FSCA 1ST 20/<: CPT | Performed by: PLASTIC SURGERY

## 2024-11-26 ENCOUNTER — OFFICE VISIT (OUTPATIENT)
Dept: ORTHOPEDIC SURGERY | Facility: CLINIC | Age: 59
End: 2024-11-26
Payer: COMMERCIAL

## 2024-11-26 DIAGNOSIS — S82.002A CLOSED DISPLACED FRACTURE OF LEFT PATELLA, UNSPECIFIED FRACTURE MORPHOLOGY, INITIAL ENCOUNTER: Primary | ICD-10-CM

## 2024-11-26 PROCEDURE — 99213 OFFICE O/P EST LOW 20 MIN: CPT | Performed by: STUDENT IN AN ORGANIZED HEALTH CARE EDUCATION/TRAINING PROGRAM

## 2024-11-26 NOTE — PROGRESS NOTES
History of Present Illness:   Patient is a for follow-up regarding her ongoing left knee pain and discomfort with loss of motion following patella fracture with open reduction to fixation, removal of hardware, wound VAC placement, etc.  She is doing much better with pain, the wound is healing, no longer has a wound VAC.  She still has problems with range of motion but is now going to begin working with outpatient physical therapy sans wound VAC.    Review of Systems   GENERAL: Negative for malaise, significant weight loss, fever  MUSCULOSKELETAL: see HPI  NEURO:  Negative    Physical Examination:  Left knee:  There is appropriate wound healing, minimal evidence of irritation of the surrounding tissue.  No gross swelling or ecchymosis  No varus malalignment  No valgus malalignment   No effusion  ROM:  Significantly limited range of motion on the left knee compared to right, lacks nearly all flexion.  No pain with internal rotation of the hip     No tenderness to palpation over medial joint line  No tenderness to palpation over lateral joint line  No laxity to valgus stress  No laxity to varus stress     Neurovascular exam normal distally    Imaging:  Deferred    Assessment:   Patient status post wound VAC following patella fracture with open reduction internal fixation, washout, etc.  Loss of motion due to fibrotic tissue and muscular architecture.    Plan:  We reviewed with the patient that her biggest homework assignment going forward is range of motion, particularly flexion.  We discussed slow and steady PT over the course of the next several months to improve this range of motion.  We discussed expectation that she will lose some range of motion long-term secondary to patella fracture with open reduction and fixation.    Follow up 3 months.     Estiven Goodrich PA-C

## 2024-11-27 ENCOUNTER — TELEPHONE (OUTPATIENT)
Dept: PRIMARY CARE | Facility: CLINIC | Age: 59
End: 2024-11-27
Payer: COMMERCIAL

## 2024-11-27 ENCOUNTER — HOME CARE VISIT (OUTPATIENT)
Dept: HOME HEALTH SERVICES | Facility: HOME HEALTH | Age: 59
End: 2024-11-27
Payer: COMMERCIAL

## 2024-11-27 NOTE — TELEPHONE ENCOUNTER
Patient called to inquire how to get the oxygen equipment removed from her home.  It was prescribe by an attending physician when she was in the hospital.  Patient stated she has never used the equipment, it's never been refilled but she is making monthly payments on it.  She     Spoke with Nurse Care Manager, it was share the patient would need to be evaluated and then an order could be written to discontinue use of equipment.   Patient was informed and an appointment was scheduled for January.

## 2024-12-03 ENCOUNTER — HOME CARE VISIT (OUTPATIENT)
Dept: HOME HEALTH SERVICES | Facility: HOME HEALTH | Age: 59
End: 2024-12-03
Payer: COMMERCIAL

## 2024-12-03 PROCEDURE — G0299 HHS/HOSPICE OF RN EA 15 MIN: HCPCS

## 2024-12-03 ASSESSMENT — ACTIVITIES OF DAILY LIVING (ADL)
OASIS_M1830: 00
HOME_HEALTH_OASIS: 00

## 2024-12-03 ASSESSMENT — ENCOUNTER SYMPTOMS
PERSON REPORTING PAIN: PATIENT
DENIES PAIN: 1

## 2024-12-05 ENCOUNTER — OFFICE VISIT (OUTPATIENT)
Dept: WOUND CARE | Facility: CLINIC | Age: 59
End: 2024-12-05
Payer: COMMERCIAL

## 2024-12-05 PROCEDURE — 11042 DBRDMT SUBQ TIS 1ST 20SQCM/<: CPT | Performed by: PLASTIC SURGERY

## 2024-12-05 PROCEDURE — 11042 DBRDMT SUBQ TIS 1ST 20SQCM/<: CPT

## 2024-12-09 ENCOUNTER — EVALUATION (OUTPATIENT)
Dept: PHYSICAL THERAPY | Facility: CLINIC | Age: 59
End: 2024-12-09
Payer: COMMERCIAL

## 2024-12-09 DIAGNOSIS — S82.002A: ICD-10-CM

## 2024-12-09 PROCEDURE — 97110 THERAPEUTIC EXERCISES: CPT | Mod: GP | Performed by: GENERAL ACUTE CARE HOSPITAL

## 2024-12-09 PROCEDURE — 97140 MANUAL THERAPY 1/> REGIONS: CPT | Mod: GP | Performed by: GENERAL ACUTE CARE HOSPITAL

## 2024-12-09 PROCEDURE — 97162 PT EVAL MOD COMPLEX 30 MIN: CPT | Mod: GP | Performed by: GENERAL ACUTE CARE HOSPITAL

## 2024-12-09 NOTE — PROGRESS NOTES
"Patient Name: Lisa Guerrero \"Esthela\"  MRN: [id number]  Today's Date: 12/9/2024  Time Calculation  Start Time: 1300  Stop Time: 1355  Time Calculation (min): 55 min    Current Problem:  1. Closed displaced fracture of left patella  Referral to Physical Therapy    Follow Up In Physical Therapy          Primary Complaint/ Functional Limitation: limitation of knee ROM Left, unable to bend functionally   Prior level of function: Independent   Date of onset: 4/3/ 2024- fall   Surgery ORIF patella fracture 5/3/24, infection 6/24, hardware removal 8/16/24  Cause: lost footing- turned fast leg went- fell straight on the concrete   Pain Location: left knee   Current Pain: 0/10  Worst Pain: 0/10   Description of pain: doesn't bend, so tight   Aggravating Activities: bending and walking   Relieving Activities: NA  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, medical type job on feet,   Prior Treatment and results of Prior treatment: home therapy- limited PT   Constitutional Symptoms: Patient Denies   Fever Chills Nausea Vomiting Loss of appetite Abdominal pain Change in bowel function Weight loss or gain Headache Unexplained fatigue Malaise Lethargy Weakness Arthralgia  Myalgia  Difficulty in sleeping Breathing trouble  Barriers to treatment/ learning: none     Gait: stiff left leg     Myotomes:  WNL  Dermatomes: WNL   Deep Tendon Reflexes 2+ bilateral and symmetrical        Patella       Achilles    AROM (degrees)  Knee AROM R/L: 132-12-0/  25-0 (36-0 after MTT)      LEFS: 30/80  Patient would benefit from the use of Dynaspint for knee flexion of eft involved knee. Due to having 25 degrees of AROM. S/p ORIF/infection/hardware removal    Evaluation, Tests and measures, Education including HEP instruction and feedback. Patient appears to be compliant and motivated to perform HEP as instructed and participate in active physical therapy as indicated. The patient was educated on: the importance of positioning, proper posture, and body " mechanics, joint mechanics and pathology, general tissue healing time, the appropriate use of heat and cold to control pain and inflammation, the importance of general therapeutic exercise, especially to stay within pain-free ROM, specific anatomy, function, & regional interdependence of involved areas, & likely cause of impairments & POC.  Patient's questions were answered to their satisfaction, & patient verbalized understanding & agreement with POC.  The patient presents to Physical Therapy with signs and symptoms consistent with the medical diagnosis of patella fracture . Key impairments include:  pain  Rom and difficulty with functional activities such as walking. Skilled PT is required to address these key impairments and to provide and progress with an appropriate home exercise program. This evaluation is of  moderate complexity due to the nature of the patient's presentation as well as the comorbidities and medical factors included in this evaluation.  Treatment may include: Therapeutic Exercise (PROM, AA/AROM, Flexibility, Strengthening, Stabilization, HEP instruction). Therapeutic Activities (Transfers, Body Mechanics, Work Related Activities, Closed Chain, Agility and Power).   Manual Techniques (Soft tissue Mobilization, Joint mobilization/Distraction, Muscle Energy Techniques, Lymphatic Drainage, Dry Needling).  Neuromuscular Reeducation (Postural Training, Balance/Proprioception, Relaxation Techniques). Biofeedback. Aquatic Exercise. Modalities: Ultrasound, Moist Heat, Cryotherapy, Vasopneumatic with or without Cryotherapy. Electrical Stimulation (TENS/IFC/ Pre modulated for pain relief, NMES for Muscle reeducation). Gait Training. Orthotic Fit and Training. Strapping, Kinesio taping.     Patient will report resting pain on Visual Analog Scale < or =0  .   Pain at worst will be < or = 0 .   Pain with (patients primary complaint) will be < or =  0.    Patient will have improved knee strength >/=   5/5, knee flexion >/=  5/5 to improve joint mechanics with functional tasks.    Knee Range of motion will improve knee Flexion>/=  130 , Extension >/= 0   to improve joint mechanics with functional activities.    Lower Extremity Functional Scale (LEFS) score will improve >/= 9 points to demonstrate overall improved functional abilities.    Patient will demonstrate minimal or no gait deviation with ambulating >/=  30 minutes, all surfaces, up/down stairs reciprocally.    Patient will correctly perform home exercise program Independently, demonstrated through patient teach back upon discharge.

## 2024-12-12 ENCOUNTER — OFFICE VISIT (OUTPATIENT)
Dept: WOUND CARE | Facility: CLINIC | Age: 59
End: 2024-12-12
Payer: COMMERCIAL

## 2024-12-12 PROCEDURE — 99212 OFFICE O/P EST SF 10 MIN: CPT

## 2024-12-17 ENCOUNTER — TREATMENT (OUTPATIENT)
Dept: PHYSICAL THERAPY | Facility: CLINIC | Age: 59
End: 2024-12-17
Payer: COMMERCIAL

## 2024-12-17 DIAGNOSIS — S82.002A: ICD-10-CM

## 2024-12-17 PROCEDURE — 97140 MANUAL THERAPY 1/> REGIONS: CPT | Mod: GP | Performed by: GENERAL ACUTE CARE HOSPITAL

## 2024-12-17 PROCEDURE — 97110 THERAPEUTIC EXERCISES: CPT | Mod: GP | Performed by: GENERAL ACUTE CARE HOSPITAL

## 2024-12-17 NOTE — PROGRESS NOTES
"Patient Name: Lisa Guerrero \"Esthela\"  MRN: [id number]  Today's Date: 12/17/2024  Time Calculation  Start Time: 1409  Stop Time: 1449  Time Calculation (min): 40 min  Current Problem:  1. Closed displaced fracture of left patella  Follow Up In Physical Therapy          Visit 2/20    Subjective:  Change in pain/ pain level: 0/10  Change in function: received dynasplint  Patient comments: love the brace but it feels like my foot falls asleep within 10 minutes    Objective: knee flex ROM: 0-37    Treatment:    Therapeutic exercise (16146):    Nustep stretches  Manual Therapy (99230):  Patella mobs proximal-distal, distal-proximal, lateral diagonals into stiffness  Tibial Femoral A-P flexion and A-P extension multiple knee flexion angles  Stretching into flexion and extension  DTM quadriceps, ITBand/ Hamstring     Assessment:  Patient notes improved pain levels resultant from activities in therapy session. Improved tissue quality noted as well as body mechanics demonstrated after cueing and corrections. Patient continues to require active therapy to progress functional capabilities with decreasing pain levels and improving mechanics with functional activities including progression of Home exercise program. Tolerance to today's treatment was good. Muscle fatigue noted.  Patient appears motivated and compliant this date, demonstrating a working understanding of principals instructed and home program.    Plan:  Progress with functional strength as tolerated with respect to tissue healing. Manual therapy PRN to improve/maintain ROM, prevent adhesion, reduce pain.  "

## 2025-01-03 ENCOUNTER — APPOINTMENT (OUTPATIENT)
Dept: PRIMARY CARE | Facility: CLINIC | Age: 60
End: 2025-01-03
Payer: COMMERCIAL

## 2025-01-03 ENCOUNTER — TELEPHONE (OUTPATIENT)
Dept: PRIMARY CARE | Facility: CLINIC | Age: 60
End: 2025-01-03

## 2025-01-03 VITALS
BODY MASS INDEX: 42.17 KG/M2 | RESPIRATION RATE: 18 BRPM | WEIGHT: 253.4 LBS | SYSTOLIC BLOOD PRESSURE: 125 MMHG | DIASTOLIC BLOOD PRESSURE: 79 MMHG | HEART RATE: 75 BPM | TEMPERATURE: 97.4 F

## 2025-01-03 DIAGNOSIS — Z12.11 SCREENING FOR COLON CANCER: ICD-10-CM

## 2025-01-03 DIAGNOSIS — G47.33 OSA (OBSTRUCTIVE SLEEP APNEA): ICD-10-CM

## 2025-01-03 DIAGNOSIS — E66.01 OBESITY, CLASS III, BMI 40-49.9 (MORBID OBESITY) (MULTI): ICD-10-CM

## 2025-01-03 DIAGNOSIS — D68.51 FACTOR V LEIDEN (MULTI): ICD-10-CM

## 2025-01-03 DIAGNOSIS — F17.210 NICOTINE DEPENDENCE, CIGARETTES, UNCOMPLICATED: ICD-10-CM

## 2025-01-03 DIAGNOSIS — R16.0 LIVER MASS: ICD-10-CM

## 2025-01-03 DIAGNOSIS — Z12.31 ENCOUNTER FOR SCREENING MAMMOGRAM FOR MALIGNANT NEOPLASM OF BREAST: ICD-10-CM

## 2025-01-03 DIAGNOSIS — Z00.00 ROUTINE GENERAL MEDICAL EXAMINATION AT A HEALTH CARE FACILITY: Primary | ICD-10-CM

## 2025-01-03 DIAGNOSIS — F32.A DEPRESSION, UNSPECIFIED DEPRESSION TYPE: ICD-10-CM

## 2025-01-03 DIAGNOSIS — I82.90 VENOUS THROMBOSIS: ICD-10-CM

## 2025-01-03 DIAGNOSIS — I50.22 CHRONIC SYSTOLIC HEART FAILURE: ICD-10-CM

## 2025-01-03 DIAGNOSIS — I42.0 NONISCHEMIC DILATED CARDIOMYOPATHY (MULTI): ICD-10-CM

## 2025-01-03 DIAGNOSIS — J43.9 PULMONARY EMPHYSEMA, UNSPECIFIED EMPHYSEMA TYPE (MULTI): ICD-10-CM

## 2025-01-03 DIAGNOSIS — Z28.20 VACCINE REFUSED BY PATIENT: ICD-10-CM

## 2025-01-03 DIAGNOSIS — K76.0 FATTY LIVER: ICD-10-CM

## 2025-01-03 DIAGNOSIS — R91.1 PULMONARY NODULE: ICD-10-CM

## 2025-01-03 PROBLEM — D50.9 IRON DEFICIENCY ANEMIA: Status: RESOLVED | Noted: 2023-05-14 | Resolved: 2025-01-03

## 2025-01-03 PROBLEM — R13.19 ESOPHAGEAL DYSPHAGIA: Status: RESOLVED | Noted: 2023-05-24 | Resolved: 2025-01-03

## 2025-01-03 PROBLEM — R06.09 DYSPNEA ON MINIMAL EXERTION: Status: RESOLVED | Noted: 2023-10-06 | Resolved: 2025-01-03

## 2025-01-03 PROBLEM — I95.9 HYPOTENSION: Status: RESOLVED | Noted: 2023-10-06 | Resolved: 2025-01-03

## 2025-01-03 PROBLEM — R09.02 HYPOXIA: Status: RESOLVED | Noted: 2023-05-11 | Resolved: 2025-01-03

## 2025-01-03 PROBLEM — I50.43: Status: RESOLVED | Noted: 2023-10-06 | Resolved: 2025-01-03

## 2025-01-03 PROBLEM — J18.9 PNEUMONIA: Status: RESOLVED | Noted: 2022-12-12 | Resolved: 2025-01-03

## 2025-01-03 PROBLEM — M25.569 KNEE PAIN: Status: RESOLVED | Noted: 2024-06-14 | Resolved: 2025-01-03

## 2025-01-03 PROBLEM — R06.00 DYSPNEA: Status: RESOLVED | Noted: 2018-01-26 | Resolved: 2025-01-03

## 2025-01-03 PROBLEM — E66.813 CLASS 3 SEVERE OBESITY IN ADULT: Status: RESOLVED | Noted: 2023-10-06 | Resolved: 2025-01-03

## 2025-01-03 PROBLEM — E87.20 LACTIC ACIDOSIS: Status: RESOLVED | Noted: 2023-10-06 | Resolved: 2025-01-03

## 2025-01-03 PROBLEM — F41.0 PANIC ATTACKS: Status: RESOLVED | Noted: 2023-04-26 | Resolved: 2025-01-03

## 2025-01-03 PROCEDURE — 3078F DIAST BP <80 MM HG: CPT | Performed by: NURSE PRACTITIONER

## 2025-01-03 PROCEDURE — 4004F PT TOBACCO SCREEN RCVD TLK: CPT | Performed by: NURSE PRACTITIONER

## 2025-01-03 PROCEDURE — 99396 PREV VISIT EST AGE 40-64: CPT | Performed by: NURSE PRACTITIONER

## 2025-01-03 PROCEDURE — 3074F SYST BP LT 130 MM HG: CPT | Performed by: NURSE PRACTITIONER

## 2025-01-03 ASSESSMENT — ENCOUNTER SYMPTOMS
ALLERGIC/IMMUNOLOGIC NEGATIVE: 1
PALPITATIONS: 0
HEMATOLOGIC/LYMPHATIC NEGATIVE: 1
PSYCHIATRIC NEGATIVE: 1
RESPIRATORY NEGATIVE: 1
CONSTITUTIONAL NEGATIVE: 1
GASTROINTESTINAL NEGATIVE: 1
CARDIOVASCULAR NEGATIVE: 1
EYES NEGATIVE: 1
ENDOCRINE NEGATIVE: 1
NEUROLOGICAL NEGATIVE: 1
MUSCULOSKELETAL NEGATIVE: 1

## 2025-01-03 NOTE — PROGRESS NOTES
Subjective   Patient ID: Esthela Guerrero is a 59 y.o. female who presents for Establish Care.    Recently fracture left patella- had 2 surgeries, wound vac worked to heal- couldn't get the bone covered so took a long time to heal.   Doing annual physical today. Is feeling pretty good aside from knee stiffness.  Refuses all vaccinations as preventative measure.       Review of Systems   Constitutional: Negative.    HENT: Negative.     Eyes: Negative.    Respiratory: Negative.     Cardiovascular: Negative.  Negative for chest pain, palpitations and leg swelling.   Gastrointestinal: Negative.    Endocrine: Negative.    Genitourinary: Negative.    Musculoskeletal: Negative.    Skin: Negative.    Allergic/Immunologic: Negative.    Neurological: Negative.    Hematological: Negative.    Psychiatric/Behavioral: Negative.         Objective   /79   Pulse 75   Temp 36.3 °C (97.4 °F)   Resp 18   Wt 115 kg (253 lb 6.4 oz)   BMI 42.17 kg/m²     Physical Exam  Vitals reviewed.   HENT:      Head: Normocephalic.      Right Ear: Tympanic membrane, ear canal and external ear normal.      Left Ear: Tympanic membrane, ear canal and external ear normal.      Nose: Nose normal.      Mouth/Throat:      Mouth: Mucous membranes are moist.   Eyes:      General:         Right eye: No discharge.      Extraocular Movements: Extraocular movements intact.      Conjunctiva/sclera: Conjunctivae normal.      Pupils: Pupils are equal, round, and reactive to light.   Cardiovascular:      Rate and Rhythm: Normal rate. Rhythm irregular.      Pulses: Normal pulses.           Carotid pulses are 2+ on the right side and 2+ on the left side.       Radial pulses are 2+ on the right side and 2+ on the left side.        Femoral pulses are 2+ on the right side and 2+ on the left side.       Popliteal pulses are 2+ on the right side and 2+ on the left side.        Dorsalis pedis pulses are 2+ on the right side and 2+ on the left side.        Posterior  tibial pulses are 2+ on the right side and 2+ on the left side.      Heart sounds: Murmur heard.      Decrescendo systolic murmur is present with a grade of 3/6.      No friction rub. No gallop.      Comments: Variscosities in bilateral lower extremities    Pulmonary:      Effort: Pulmonary effort is normal. No respiratory distress.      Breath sounds: No stridor. Examination of the right-upper field reveals decreased breath sounds. Examination of the left-upper field reveals decreased breath sounds. Examination of the right-middle field reveals decreased breath sounds. Examination of the left-middle field reveals decreased breath sounds. Examination of the right-lower field reveals decreased breath sounds. Examination of the left-lower field reveals decreased breath sounds. Decreased breath sounds present. No wheezing (mild), rhonchi or rales.   Chest:      Chest wall: No tenderness.   Abdominal:      General: Abdomen is flat.   Musculoskeletal:         General: Normal range of motion.      Cervical back: Normal range of motion.      Right lower le+ Edema present.      Left lower leg: 3+ Edema present.   Skin:     General: Skin is warm.      Capillary Refill: Capillary refill takes 2 to 3 seconds.   Neurological:      General: No focal deficit present.      Mental Status: She is alert and oriented to person, place, and time. Mental status is at baseline.   Psychiatric:         Mood and Affect: Mood normal.         Behavior: Behavior normal.         Thought Content: Thought content normal.         Judgment: Judgment normal.         Assessment/Plan   Problem List Items Addressed This Visit             ICD-10-CM    Chronic obstructive pulmonary disease (Multi) J44.9     Repeat low dose ct lung scan          Depression F32.A     Continue zoloft and gabapentin           Nicotine dependence, cigarettes, uncomplicated F17.210     Cold turkey has worked in the past  But she has smoked out of habit.            Relevant  Orders    CT lung screening low dose    Nonischemic dilated cardiomyopathy (Multi) I42.0     Activity tolerance is good, no shortness of breath. Continue Entresto, Xarelto and every 6 month follow up with Dr. Feldman.   Advised to take twice a day.              HERNANDO (obstructive sleep apnea) G47.33     Doesn't have a sleep study on file. Was not diagnosed.          Relevant Orders    Home sleep apnea test (HSAT)    Factor V Leiden (Multi) D68.51    Fatty liver K76.0     Avoid sugar sweetened beverages            Liver mass R16.0    Obesity, Class III, BMI 40-49.9 (morbid obesity) (Multi) E66.01    Chronic systolic heart failure I50.22    Venous thrombosis I82.90     Continue current treatment regimen with Xarelto         Vaccine refused by patient Z28.20     Other Visit Diagnoses         Codes    Routine general medical examination at a health care facility    -  Primary Z00.00    Relevant Orders    Comprehensive metabolic panel    Hemoglobin A1C    Protein, urine, 24 hour    Lipid panel    CBC    Uric acid    Pulmonary nodule     R91.1    Relevant Orders    CT lung screening low dose    Encounter for screening mammogram for malignant neoplasm of breast     Z12.31    Relevant Orders    BI mammo bilateral screening tomosynthesis    Screening for colon cancer     Z12.11    Relevant Orders    Cologuard® colon cancer screening

## 2025-01-03 NOTE — TELEPHONE ENCOUNTER
Patient states she needs a letter for the supply company stating that patient no longer needs oxygen, so they can come  their equipment.

## 2025-01-03 NOTE — ASSESSMENT & PLAN NOTE
Activity tolerance is good, no shortness of breath. Continue Entresto, Xarelto and every 6 month follow up with Dr. Feldman.   Advised to take twice a day.       
Avoid sugar sweetened beverages     
Cold turkey has worked in the past  But she has smoked out of habit.     
Continue current treatment regimen with Xarelto  
Continue zoloft and gabapentin    
Doesn't have a sleep study on file. Was not diagnosed.   
Repeat low dose ct lung scan   
138

## 2025-01-17 ENCOUNTER — TELEPHONE (OUTPATIENT)
Dept: PRIMARY CARE | Facility: CLINIC | Age: 60
End: 2025-01-17
Payer: COMMERCIAL

## 2025-01-17 NOTE — TELEPHONE ENCOUNTER
[employer] called the code for colon guard is incorrect on order. Needs corrected for ins to improve.     You can reach a rep at [phone] use reference # [id number]    Please advise       Thank you !

## 2025-02-05 ENCOUNTER — APPOINTMENT (OUTPATIENT)
Dept: PHYSICAL THERAPY | Facility: CLINIC | Age: 60
End: 2025-02-05
Payer: COMMERCIAL

## 2025-02-06 DIAGNOSIS — I42.9 CARDIOMYOPATHY, UNSPECIFIED: ICD-10-CM

## 2025-02-06 RX ORDER — SACUBITRIL AND VALSARTAN 24; 26 MG/1; MG/1
1 TABLET, FILM COATED ORAL 2 TIMES DAILY
Qty: 60 TABLET | Refills: 11 | Status: SHIPPED | OUTPATIENT
Start: 2025-02-06

## 2025-02-07 ENCOUNTER — HOSPITAL ENCOUNTER (OUTPATIENT)
Dept: RADIOLOGY | Facility: HOSPITAL | Age: 60
Discharge: HOME | End: 2025-02-07
Payer: COMMERCIAL

## 2025-02-07 VITALS — WEIGHT: 253.4 LBS | HEIGHT: 65 IN | BODY MASS INDEX: 42.22 KG/M2

## 2025-02-07 DIAGNOSIS — F17.210 NICOTINE DEPENDENCE, CIGARETTES, UNCOMPLICATED: ICD-10-CM

## 2025-02-07 DIAGNOSIS — Z12.31 ENCOUNTER FOR SCREENING MAMMOGRAM FOR MALIGNANT NEOPLASM OF BREAST: ICD-10-CM

## 2025-02-07 DIAGNOSIS — R91.1 PULMONARY NODULE: ICD-10-CM

## 2025-02-07 PROCEDURE — 77067 SCR MAMMO BI INCL CAD: CPT

## 2025-02-07 PROCEDURE — 71271 CT THORAX LUNG CANCER SCR C-: CPT

## 2025-02-07 PROCEDURE — 71271 CT THORAX LUNG CANCER SCR C-: CPT | Performed by: STUDENT IN AN ORGANIZED HEALTH CARE EDUCATION/TRAINING PROGRAM

## 2025-02-25 ENCOUNTER — APPOINTMENT (OUTPATIENT)
Dept: ORTHOPEDIC SURGERY | Facility: CLINIC | Age: 60
End: 2025-02-25
Payer: COMMERCIAL

## 2025-02-27 ENCOUNTER — OFFICE VISIT (OUTPATIENT)
Dept: PRIMARY CARE | Facility: CLINIC | Age: 60
End: 2025-02-27
Payer: COMMERCIAL

## 2025-02-27 VITALS
OXYGEN SATURATION: 95 % | RESPIRATION RATE: 16 BRPM | WEIGHT: 245 LBS | BODY MASS INDEX: 40.82 KG/M2 | TEMPERATURE: 97.2 F | HEART RATE: 79 BPM | DIASTOLIC BLOOD PRESSURE: 70 MMHG | HEIGHT: 65 IN | SYSTOLIC BLOOD PRESSURE: 108 MMHG

## 2025-02-27 DIAGNOSIS — J43.9 PULMONARY EMPHYSEMA, UNSPECIFIED EMPHYSEMA TYPE (MULTI): ICD-10-CM

## 2025-02-27 DIAGNOSIS — J32.9 BACTERIAL SINUSITIS: Primary | ICD-10-CM

## 2025-02-27 DIAGNOSIS — B96.89 BACTERIAL SINUSITIS: Primary | ICD-10-CM

## 2025-02-27 DIAGNOSIS — Q25.79 PULMONARY ARTERY ABNORMALITY (HHS-HCC): ICD-10-CM

## 2025-02-27 DIAGNOSIS — F17.210 NICOTINE DEPENDENCE, CIGARETTES, UNCOMPLICATED: ICD-10-CM

## 2025-02-27 PROCEDURE — 3074F SYST BP LT 130 MM HG: CPT | Performed by: NURSE PRACTITIONER

## 2025-02-27 PROCEDURE — 3078F DIAST BP <80 MM HG: CPT | Performed by: NURSE PRACTITIONER

## 2025-02-27 PROCEDURE — 99213 OFFICE O/P EST LOW 20 MIN: CPT | Performed by: NURSE PRACTITIONER

## 2025-02-27 PROCEDURE — 3008F BODY MASS INDEX DOCD: CPT | Performed by: NURSE PRACTITIONER

## 2025-02-27 RX ORDER — BUDESONIDE AND FORMOTEROL FUMARATE DIHYDRATE 80; 4.5 UG/1; UG/1
2 AEROSOL RESPIRATORY (INHALATION)
Qty: 10.2 G | Refills: 5 | Status: SHIPPED | OUTPATIENT
Start: 2025-02-27 | End: 2026-02-27

## 2025-02-27 RX ORDER — FLUTICASONE PROPIONATE 50 MCG
2 SPRAY, SUSPENSION (ML) NASAL DAILY
Qty: 16 G | Refills: 3 | Status: SHIPPED | OUTPATIENT
Start: 2025-02-27

## 2025-02-27 RX ORDER — DOXYCYCLINE 100 MG/1
100 CAPSULE ORAL 2 TIMES DAILY
Qty: 20 CAPSULE | Refills: 0 | Status: SHIPPED | OUTPATIENT
Start: 2025-02-27 | End: 2025-03-09

## 2025-02-27 ASSESSMENT — ENCOUNTER SYMPTOMS
SINUS PRESSURE: 1
HEADACHES: 1
COUGH: 1
SINUS COMPLAINT: 1
PALPITATIONS: 0

## 2025-02-27 NOTE — PROGRESS NOTES
"Ismael Guerrero \"Esthela\" is a 60 y.o. female who presents for Sinus Problem.  Patient presents for Sinus issues started 2 weeks ago, having a lot of Sinus Pressure, eye pain, a lot of phlegm yellowish, hasn't eaten in 4 day, dizziness went away, and ate last night.    Sinus Problem  This is a recurrent problem. The current episode started 1 to 4 weeks ago. The problem is unchanged. Associated symptoms include congestion, coughing, headaches and sinus pressure. The treatment provided no relief.     Review of Systems   HENT:  Positive for congestion, postnasal drip and sinus pressure.    Respiratory:  Positive for cough.    Cardiovascular:  Negative for chest pain, palpitations and leg swelling.   Neurological:  Positive for headaches.   All other systems reviewed and are negative.      Objective   Physical Exam  Vitals reviewed.   HENT:      Head: Normocephalic.   Cardiovascular:      Rate and Rhythm: Normal rate and regular rhythm.      Pulses: Normal pulses.      Heart sounds: Normal heart sounds. No murmur heard.     No friction rub. No gallop.   Pulmonary:      Effort: Pulmonary effort is normal. No respiratory distress.      Breath sounds: No stridor. Examination of the right-upper field reveals wheezing. Examination of the left-upper field reveals wheezing. Examination of the right-middle field reveals wheezing. Examination of the left-middle field reveals wheezing. Examination of the right-lower field reveals wheezing and rhonchi. Examination of the left-lower field reveals wheezing and rhonchi. Wheezing and rhonchi present. No rales.   Chest:      Chest wall: No tenderness.   Neurological:      General: No focal deficit present.      Mental Status: She is alert and oriented to person, place, and time. Mental status is at baseline.       /70 (BP Location: Left arm, Patient Position: Sitting, BP Cuff Size: Large adult)   Pulse 79   Temp 36.2 °C (97.2 °F) (Temporal)   Resp 16   Ht 1.651 m " "(5' 5\")   Wt 111 kg (245 lb)   SpO2 95%   BMI 40.77 kg/m²       Assessment/Plan   Problem List Items Addressed This Visit       Chronic obstructive pulmonary disease (Multi)    Relevant Medications    budesonide-formoteroL (Symbicort) 80-4.5 mcg/actuation inhaler    Other Relevant Orders    Referral to Pulmonology    Nicotine dependence, cigarettes, uncomplicated     Hasn't smoked in 5 days wants to stay off of it          Bacterial sinusitis - Primary    Relevant Medications    doxycycline (Vibramycin) 100 mg capsule    fluticasone (Flonase) 50 mcg/actuation nasal spray    Pulmonary artery abnormality ([facility])     I want patient to go back and see pulmonology. There is concern for pulm htn and small vessel airway disease- which should be managed by pulm.  She thinks she has sleep apnea has to be tested wants to do  home study.   "

## 2025-02-27 NOTE — PATIENT INSTRUCTIONS
Home sleep test  Echo per Myles arevalo.  Acute treatment for illness right now   Back in with pulm asap

## 2025-03-01 PROBLEM — Q25.79 PULMONARY ARTERY ABNORMALITY (HHS-HCC): Status: ACTIVE | Noted: 2025-03-01

## 2025-03-07 ENCOUNTER — APPOINTMENT (OUTPATIENT)
Dept: PRIMARY CARE | Facility: CLINIC | Age: 60
End: 2025-03-07
Payer: COMMERCIAL

## 2025-03-07 VITALS
WEIGHT: 246 LBS | HEIGHT: 65 IN | TEMPERATURE: 97.2 F | HEART RATE: 62 BPM | DIASTOLIC BLOOD PRESSURE: 78 MMHG | SYSTOLIC BLOOD PRESSURE: 119 MMHG | RESPIRATION RATE: 16 BRPM | BODY MASS INDEX: 40.98 KG/M2 | OXYGEN SATURATION: 97 %

## 2025-03-07 DIAGNOSIS — I27.20 PULMONARY HTN (MULTI): Primary | ICD-10-CM

## 2025-03-07 DIAGNOSIS — R06.83 SNORING: ICD-10-CM

## 2025-03-07 DIAGNOSIS — J43.9 PULMONARY EMPHYSEMA, UNSPECIFIED EMPHYSEMA TYPE (MULTI): ICD-10-CM

## 2025-03-07 DIAGNOSIS — E66.01 OBESITY, CLASS III, BMI 40-49.9 (MORBID OBESITY) (MULTI): ICD-10-CM

## 2025-03-07 PROCEDURE — 3074F SYST BP LT 130 MM HG: CPT | Performed by: NURSE PRACTITIONER

## 2025-03-07 PROCEDURE — 3078F DIAST BP <80 MM HG: CPT | Performed by: NURSE PRACTITIONER

## 2025-03-07 PROCEDURE — 3008F BODY MASS INDEX DOCD: CPT | Performed by: NURSE PRACTITIONER

## 2025-03-07 PROCEDURE — 99214 OFFICE O/P EST MOD 30 MIN: CPT | Performed by: NURSE PRACTITIONER

## 2025-03-07 RX ORDER — ALBUTEROL SULFATE 90 UG/1
1-2 INHALANT RESPIRATORY (INHALATION) EVERY 4 HOURS PRN
Qty: 18 G | Refills: 0 | Status: SHIPPED | OUTPATIENT
Start: 2025-03-07

## 2025-03-07 NOTE — PROGRESS NOTES
"Subjective   Esthela Guerrero is a 60 y.o. female discussion of pulmonary hypertension. Symptoms include  dry cough, morning cough, and wheezing. Symptoms began several years ago, stable since.  She denies chest pain or SOB.. Associated symptoms include  recurrent upper respiratory infections . She has not had recent travel.. Appetite has been  normal . Symptoms are exacerbated by  respiratory infections . Symptoms are alleviated by rest. Work up thus far has included CT chest and Echo.    Patient is suffering from insomnia, she can't sleep she did very well when she was in the hospital on CPAP and felt a lot better.  Home sleep study ordered at this time.     Objective   /78 (BP Location: Left arm, Patient Position: Sitting, BP Cuff Size: Large adult)   Pulse 62   Temp 36.2 °C (97.2 °F) (Temporal)   Resp 16   Ht 1.651 m (5' 5\")   Wt 112 kg (246 lb)   SpO2 97%   BMI 40.94 kg/m²   Physical Exam  Vitals reviewed.   HENT:      Head: Normocephalic.   Cardiovascular:      Rate and Rhythm: Normal rate and regular rhythm.      Pulses: Normal pulses.      Heart sounds: Murmur heard.      Systolic murmur is present with a grade of 3/6.      No friction rub. No gallop.   Pulmonary:      Effort: Pulmonary effort is normal. No respiratory distress.      Breath sounds: No stridor. Wheezing (diffuse) and rhonchi (diffuse) present. No rales.   Chest:      Chest wall: No tenderness.   Neurological:      General: No focal deficit present.      Mental Status: She is alert and oriented to person, place, and time. Mental status is at baseline.          Diagnostic Review  CT of chest performed on 02/07/2025    revealed  .  IMPRESSION:  1.  Few small bilateral noncalcified pulmonary nodules, with solid  nodules measuring up to 3 mm and ground-glass nodules measuring up to  6 mm, likely benign. Continued screening with low-dose noncontrast  chest CT in 12 months (from current date) is recommended.  2. Redemonstrated minimal " biapical predominant emphysema. Large areas  of mosaic attenuation again noted bilaterally and correlate with  concern for underlying small vessel/small airway disease.  3. Redemonstrated mild coronary artery calcifications, indicating the  presence of coronary artery disease. If the patient has associated  symptoms recommend management as per chest pain guidelines (e.g.  https://doi.org/10.1161/CIR.0785901756311338). If the patient is  asymptomatic consider reviewing modifiable cardiovascular risk  factors and managing as per guidelines for primary prevention (e.g.  https://doi.org/10.1161/CIR.7858009140262223)  4. Mildly dilated main pulmonary artery and correlate with concern  for pulmonary hypertension.  5. Additional chronic findings as above.        Assessment/Plan   Pulmonary arterial hypertension secondary to COPD, HERNANDO with evidence of right heart failure. WHO class Class 1 - no symptoms of any kind, and for whom ordinary physical activity does not cause fatigue, palpitations, dyspnea, or anginal pain.. Patient is followed by Dr. Feldman.     1. Pulmonary HTN (Multi) (Primary)    - Transthoracic Echo Complete; Future  - Home sleep apnea test (HSAT); Future  - Home sleep apnea test (HSAT); Future    2. Snoring    - Home sleep apnea test (HSAT); Future  - Home sleep apnea test (HSAT); Future    3. Pulmonary emphysema, unspecified emphysema type (Multi)    - Home sleep apnea test (HSAT); Future  - albuterol 90 mcg/actuation inhaler; Inhale 1-2 puffs every 4 hours if needed for wheezing.  Dispense: 18 g; Refill: 0  - Home sleep apnea test (HSAT); Future    4. Obesity, Class III, BMI 40-49.9 (morbid obesity) (Multi)    - Home sleep apnea test (HSAT); Future  - Home sleep apnea test (HSAT); Future    Please get repeat Echocardigram for measurement of pulmonary pressures. I advised the importance of getting on CPAP     Discussed evaluation, treatment and usual course.  Echocardiogram per orders..

## 2025-03-08 PROBLEM — I27.20 PULMONARY HTN (MULTI): Status: ACTIVE | Noted: 2025-03-08

## 2025-03-10 ENCOUNTER — HOSPITAL ENCOUNTER (OUTPATIENT)
Dept: CARDIOLOGY | Facility: CLINIC | Age: 60
Discharge: HOME | End: 2025-03-10
Payer: COMMERCIAL

## 2025-03-10 DIAGNOSIS — I27.20 PULMONARY HTN (MULTI): ICD-10-CM

## 2025-03-10 DIAGNOSIS — I50.22 CHRONIC SYSTOLIC HEART FAILURE: ICD-10-CM

## 2025-03-10 DIAGNOSIS — I27.0 PRIMARY PULMONARY HYPERTENSION (MULTI): ICD-10-CM

## 2025-03-10 LAB
AORTIC VALVE MEAN GRADIENT: 4 MMHG
AORTIC VALVE PEAK VELOCITY: 1.23 M/S
AV PEAK GRADIENT: 6 MMHG
AVA (PEAK VEL): 2.45 CM2
AVA (VTI): 2.3 CM2
EJECTION FRACTION APICAL 4 CHAMBER: 46.2
EJECTION FRACTION: 43 %
LEFT ATRIUM VOLUME AREA LENGTH INDEX BSA: 56.4 ML/M2
LEFT VENTRICLE INTERNAL DIMENSION DIASTOLE: 6.83 CM (ref 3.5–6)
LEFT VENTRICULAR OUTFLOW TRACT DIAMETER: 2.02 CM
MITRAL VALVE E/A RATIO: 1.58
RIGHT VENTRICLE FREE WALL PEAK S': 11 CM/S
RIGHT VENTRICLE PEAK SYSTOLIC PRESSURE: 30.9 MMHG
TRICUSPID ANNULAR PLANE SYSTOLIC EXCURSION: 2.1 CM

## 2025-03-10 PROCEDURE — 2500000004 HC RX 250 GENERAL PHARMACY W/ HCPCS (ALT 636 FOR OP/ED): Performed by: INTERNAL MEDICINE

## 2025-03-10 PROCEDURE — C8929 TTE W OR WO FOL WCON,DOPPLER: HCPCS

## 2025-03-10 PROCEDURE — 93306 TTE W/DOPPLER COMPLETE: CPT | Performed by: INTERNAL MEDICINE

## 2025-03-10 RX ADMIN — PERFLUTREN 2.5 ML OF DILUTION: 6.52 INJECTION, SUSPENSION INTRAVENOUS at 14:10

## 2025-03-18 ENCOUNTER — OFFICE VISIT (OUTPATIENT)
Dept: CARDIOLOGY | Facility: CLINIC | Age: 60
End: 2025-03-18
Payer: COMMERCIAL

## 2025-03-18 VITALS
HEIGHT: 65 IN | OXYGEN SATURATION: 96 % | SYSTOLIC BLOOD PRESSURE: 110 MMHG | BODY MASS INDEX: 41.32 KG/M2 | HEART RATE: 76 BPM | WEIGHT: 248 LBS | DIASTOLIC BLOOD PRESSURE: 70 MMHG

## 2025-03-18 DIAGNOSIS — G47.33 OSA (OBSTRUCTIVE SLEEP APNEA): ICD-10-CM

## 2025-03-18 DIAGNOSIS — M25.569 KNEE PAIN, UNSPECIFIED CHRONICITY, UNSPECIFIED LATERALITY: ICD-10-CM

## 2025-03-18 DIAGNOSIS — I34.0 NONRHEUMATIC MITRAL VALVE REGURGITATION: ICD-10-CM

## 2025-03-18 DIAGNOSIS — I50.22 CHRONIC SYSTOLIC HEART FAILURE: Primary | ICD-10-CM

## 2025-03-18 DIAGNOSIS — D68.2 FACTOR V DEFICIENCY (MULTI): ICD-10-CM

## 2025-03-18 DIAGNOSIS — G47.30 SLEEP APNEA, UNSPECIFIED: ICD-10-CM

## 2025-03-18 PROCEDURE — 3078F DIAST BP <80 MM HG: CPT | Performed by: INTERNAL MEDICINE

## 2025-03-18 PROCEDURE — 3008F BODY MASS INDEX DOCD: CPT | Performed by: INTERNAL MEDICINE

## 2025-03-18 PROCEDURE — 4004F PT TOBACCO SCREEN RCVD TLK: CPT | Performed by: INTERNAL MEDICINE

## 2025-03-18 PROCEDURE — 99214 OFFICE O/P EST MOD 30 MIN: CPT | Performed by: INTERNAL MEDICINE

## 2025-03-18 PROCEDURE — 3074F SYST BP LT 130 MM HG: CPT | Performed by: INTERNAL MEDICINE

## 2025-03-18 ASSESSMENT — PAIN SCALES - GENERAL: PAINLEVEL_OUTOF10: 0-NO PAIN

## 2025-03-18 NOTE — PROGRESS NOTES
"Chief Complaint:   No chief complaint on file.     History Of Present Illness:    Lisa Guerrero \"Nishant" is a 60 y.o. 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     Left knee wound has healed.  She is still sleeping in a chair because she cannot straighten her leg well.  Gets short of breath but is also been suffering from an upper respiratory infection.  Still producing some sputum but is improving overall.    Denies any chest pain.     She was diagnosed with factor V Leiden deficiency and is remained on Xarelto 20 mg ever since. She has not had a recurrent thromboembolic events since that time. She denies any bleeding issues with Xarelto.     Her daughter, Kash, used to work in our office.       Echocardiogram 3/10/2025: Severely dilated LV (LVIDD 6.83 cm) with EF 43%.  Moderate eccentric LVH.  Severe left atrial enlargement.  Moderate to severe posteriorly directed MR.  RVSP 31 mmHg.     Right heart catheterization 7/5/2023: RA 38, RV 73/35, wedge 52, PA 72/33 (50). CO/CI 3.4/1.5     Echocardiogram 6/22/2023: EF 40 to 45%. Moderately enlarged RV with moderately reduced systolic function. Moderate left atrial enlargement. Moderate MR and TR. RVSP 44 mmHg.     Echocardiogram 12/12/2022: Dilated left ventricle (LVIDD 7.3 cm) with EF 55 to 60%. Grade 1 diastolic dysfunction. Moderate left atrial enlargement. Moderate MR.      Echocardiogram 7/21/2018: EF 30 to 40% with grade 3 diastolic dysfunction. Severe left atrial enlargement. Severe MR. Moderate TR.       Catheterization 2/1/2018: By report, angiographically normal coronary arteries.  Performed at [facility] by Dr. Jerez.     Allergies:  Ceftriaxone, Codeine, Penicillins, Percocet [oxycodone-acetaminophen], " "Sacubitril-valsartan, Sulfamethoxazole-trimethoprim, Hydrogen peroxide, and Latex    Outpatient Medications:  Current Outpatient Medications   Medication Instructions    albuterol 90 mcg/actuation inhaler 1-2 puffs, inhalation, Every 4 hours PRN    ammonium lactate (Lac-Hydrin) 12 % lotion As needed    budesonide-formoteroL (Symbicort) 80-4.5 mcg/actuation inhaler 2 puffs, inhalation, 2 times daily RT, Rinse mouth with water after use to reduce aftertaste and incidence of candidiasis. Do not swallow.    bumetanide (BUMEX) 2 mg, oral, 2 times daily    Entresto 24-26 mg tablet 1 tablet, oral, 2 times daily    fluticasone (Flonase) 50 mcg/actuation nasal spray 2 sprays, Each Nostril, Daily    magnesium oxide (MAG-OX) 400 mg, Daily    metoprolol succinate XL (TOPROL-XL) 100 mg, oral, Daily    potassium chloride CR 10 mEq ER tablet 10 mEq, oral, Daily    potassium gluconate 595 mg (99 mg) tablet extended release 1 tablet, Daily    sertraline (ZOLOFT) 100 mg, oral, Daily    TURMERIC ORAL 1 capsule, Daily    Xarelto 20 mg, oral, Daily       Last Recorded Vitals:  Visit Vitals  /70 (BP Location: Left arm, Patient Position: Sitting, BP Cuff Size: Adult)   Pulse 76   Ht 1.651 m (5' 5\")   Wt 112 kg (248 lb)   SpO2 96%   BMI 41.27 kg/m²   OB Status Postmenopausal   Smoking Status Every Day   BSA 2.27 m²      LASTWT(3):   Wt Readings from Last 3 Encounters:   03/18/25 112 kg (248 lb)   03/07/25 112 kg (246 lb)   02/27/25 111 kg (245 lb)       Physical Exam:  In general: alert and in no acute distress.   HEENT: Carotid upstrokes normal with no bruits. JVP is normal.   Pulmonary: Clear to auscultation bilaterally.  Cardiovascular: S1, S2, regular. No appreciable murmurs, rubs or gallops.   Lower extremities: Warm. 2+ distal pulses. No edema.     Last Labs:  CBC -  Recent Labs     07/15/24  1015 07/08/24  1600 07/04/24  1000   WBC 7.8 7.3 7.9   HGB 15.0 14.4 14.6   HCT 47.3* 45.6 47.2*    222 228   MCV 88 89 89 "       CMP -  Recent Labs     07/15/24  1015 07/08/24  1600 06/26/24  1100 05/04/24  0537 04/22/24  1522 07/31/23  1033 07/18/23  0354    139 143   < > 138 139 CANCELED   K 4.4 4.3 4.7   < > 4.2 4.1 CANCELED    107 104   < > 104 104 CANCELED   CO2 24 23 23   < > 22 24 CANCELED   ANIONGAP 14 13 21*   < > 16 15 CANCELED   BUN 38* 20 20   < > 20 37* CANCELED   CREATININE 1.31* 0.91 0.78   < > 0.87 1.09* CANCELED   EGFR 46* 72 87   < > 76  --   --    MG  --   --   --   --  1.76 1.47* CANCELED    < > = values in this interval not displayed.     Recent Labs     06/14/24  1144 04/22/24  1522 07/18/23  0354 07/12/23  1957 07/12/23  0315   ALBUMIN 3.9 4.2 CANCELED   < > 3.2*   ALKPHOS 69 77  --   --  76   ALT 7 10  --   --  8   AST 5* 6*  --   --  7*   BILITOT 0.3 0.5  --   --  2.1*    < > = values in this interval not displayed.       LIPID PANEL -   Recent Labs     07/03/23 2009 12/12/22  1242   CHOL CANCELED 156   LDLF CANCELED 107*   HDL CANCELED 33.1*   TRIG CANCELED 81       Recent Labs     07/31/23  1033 07/03/23 2009 07/03/23 2008 06/21/23  1437 05/22/23  1714   BNP 1,052* CANCELED 1,382*   < >  --    HGBA1C  --  CANCELED 5.7*  --  7.1*    < > = values in this interval not displayed.           Assessment/Plan   1) chronic systolic heart failure: Severity of mitral regurgitation is worsened despite the fact that her LV diameter has decreased since her earlier echocardiograms.  No obvious signs of volume overload.  Continue with metoprolol and the Entresto.  Start Jardiance 10 mg daily.      Would like to consider up titration of Entresto or addition of MRA as a next step.    Do agree that treatment of her sleep apnea would help with this condition as well.  She spoke to her PCP who is looking into a sleep study.    Ultimately, after 3 months of optimal medical therapy, I would like to repeat the echocardiogram to reassess the severity of the mitral regurgitation and her LV function.  If she still has  severe mitral regurgitation then I would like to perform a left and right heart catheterization and SAM prior to having her seen by the structural heart team to see if a MitraClip or mitral valve repair would be feasible.     2) factor V Leiden deficiency with history of jugular vein thrombosis: Continue Xarelto.     3) HERNANDO: Sleep study ordered in the past.  Her PCP is looking into another sleep study.     4) mitral regurgitation: See the discussion above.  Likely secondary to annular dilatation from LV dysfunction.     5) follow-up: 2 to 4 weeks with NP or sooner if needed.      Nathan Feldman MD

## 2025-03-20 DIAGNOSIS — G47.33 OSA (OBSTRUCTIVE SLEEP APNEA): Primary | ICD-10-CM

## 2025-04-21 DIAGNOSIS — F32.A DEPRESSION, UNSPECIFIED: ICD-10-CM

## 2025-04-21 RX ORDER — SERTRALINE HYDROCHLORIDE 100 MG/1
100 TABLET, FILM COATED ORAL DAILY
Qty: 90 TABLET | Refills: 1 | Status: SHIPPED | OUTPATIENT
Start: 2025-04-21

## 2025-06-05 ENCOUNTER — APPOINTMENT (OUTPATIENT)
Dept: CARDIOLOGY | Facility: CLINIC | Age: 60
End: 2025-06-05
Payer: COMMERCIAL

## 2025-08-07 DIAGNOSIS — I50.22 CHRONIC SYSTOLIC (CONGESTIVE) HEART FAILURE: ICD-10-CM

## 2025-08-07 RX ORDER — BUMETANIDE 2 MG/1
2 TABLET ORAL 2 TIMES DAILY
Qty: 180 TABLET | Refills: 3 | Status: SHIPPED | OUTPATIENT
Start: 2025-08-07

## 2025-08-20 DIAGNOSIS — I82.890 ACUTE EMBOLISM AND THROMBOSIS OF OTHER SPECIFIED VEINS: ICD-10-CM

## 2025-08-26 RX ORDER — RIVAROXABAN 20 MG/1
20 TABLET, FILM COATED ORAL DAILY
Qty: 90 TABLET | Refills: 0 | Status: SHIPPED | OUTPATIENT
Start: 2025-08-26

## 2025-09-02 ENCOUNTER — OFFICE VISIT (OUTPATIENT)
Dept: CARDIOLOGY | Facility: CLINIC | Age: 60
End: 2025-09-02
Payer: COMMERCIAL

## 2025-09-02 DIAGNOSIS — D68.2 FACTOR V DEFICIENCY (MULTI): ICD-10-CM

## 2025-09-02 DIAGNOSIS — I50.22 CHRONIC SYSTOLIC HEART FAILURE: Primary | ICD-10-CM

## 2025-09-02 DIAGNOSIS — G47.33 OSA (OBSTRUCTIVE SLEEP APNEA): ICD-10-CM

## 2025-09-02 DIAGNOSIS — I34.0 NONRHEUMATIC MITRAL VALVE REGURGITATION: ICD-10-CM

## (undated) DEVICE — SHEET,DRAPE,53X77,STERILE: Brand: MEDLINE

## (undated) DEVICE — SUTURE, VICRYL, 1, 36 IN, V-34, VIOLET

## (undated) DEVICE — PADDING, CAST, SPECIALIST, 6 IN X 4 YD, STERILE

## (undated) DEVICE — DRAPE, C-ARM IMAGE

## (undated) DEVICE — TOWEL PACK, STERILE, 4/PACK, BLUE

## (undated) DEVICE — DRILL BIT, 2.0MM, QC, 140MM, W/DEPTH MARK

## (undated) DEVICE — BANDAGE, COFLEX, 6 X 5 YDS, FOAM TAN, STERILE, LF

## (undated) DEVICE — 4-PORT MANIFOLD: Brand: NEPTUNE 2

## (undated) DEVICE — TIP, SUCTION, YANKAUER, FLEXIBLE

## (undated) DEVICE — SUTURE MCRYL + SZ 3-0 L36IN ABSRB UD CT-1 L36MM 1/2 CIR MCP944H

## (undated) DEVICE — DRAPE EQUIP TRNSPRT CONTAINMENT FOR BK TAB

## (undated) DEVICE — SUTURE VCRL SZ 1 L36IN ABSRB UD L36MM CT-1 1/2 CIR J947H

## (undated) DEVICE — SUTURE, VICRYL, 1, 36 IN, CT-1, UNDYED

## (undated) DEVICE — SOLUTION PREP POVIDONE IOD FOR SKIN MUCOUS MEM PRIOR TO

## (undated) DEVICE — WOUND SYSTEM, DEBRIDEMENT & CLEANING, O.R DUOPAK

## (undated) DEVICE — 3M™ STERI-STRIP™ REINFORCED ADHESIVE SKIN CLOSURES, R1547, 1/2 IN X 4 IN (12 MM X 100 MM), 6 STRIPS/ENVELOPE: Brand: 3M™ STERI-STRIP™

## (undated) DEVICE — HIGH FLOW TIP FOR INTERPULSE HANDPIECE SET

## (undated) DEVICE — GLOVE ORANGE PI 8   MSG9080

## (undated) DEVICE — Device

## (undated) DEVICE — DRESSING, ABDOMINAL, WET PRUF, TENDERSORB, 5 X 9 IN, STERILE

## (undated) DEVICE — GOWN, SURGICAL, ROYAL SILK, XL, STERILE

## (undated) DEVICE — STRAP, VELCRO, BODY, 4 X 60IN, NS

## (undated) DEVICE — PILLOW POS W15XH6XL22IN RASPBERRY FOAM ABD W/ STRP DISP FOR

## (undated) DEVICE — STOCKINETTE, IMPERVIOUS, LARGE, 9IN X 48IN

## (undated) DEVICE — TOWEL PACK 10-PK

## (undated) DEVICE — GLOVE, SURGICAL, PROTEXIS PI , 7.5, PF, LF

## (undated) DEVICE — DRESSING, GAUZE, PETROLATUM, STRIP, XEROFORM, 1 X 8 IN, STERILE

## (undated) DEVICE — 3M™ STERI-DRAPE™ U-DRAPE 1015: Brand: STERI-DRAPE™

## (undated) DEVICE — SUTURE, MONOCRYL, 3-0, PS-1 27IN, UNDYED

## (undated) DEVICE — SUTURE ETHBND EXCEL SZ 5 L30IN NONABSORBABLE GRN L48MM V-40 MB46G

## (undated) DEVICE — CHLORAPREP 26ML ORANGE

## (undated) DEVICE — BANDAGE, ELASTIC, 6 X 10YD, BEIGE, LF

## (undated) DEVICE — GLOVE, SURGICAL, PROTEXIS PI BLUE W/NEUTHERA, 7.5, PF, LF

## (undated) DEVICE — DRAPE, SHEET, U, W/ADHESIVE STRIP, IMPERVIOUS, 60 X 70 IN, DISPOSABLE, LF, STERILE

## (undated) DEVICE — PADDING, CAST, SPECIALIST, 6 IN X 4 YD

## (undated) DEVICE — DRAPE,U/ SHT,SPLIT,PLAS,STERIL: Brand: MEDLINE

## (undated) DEVICE — Z DISCONTINUED PER MEDLINE USE 2741943 DRESSING AQUACEL 10 IN ALG W9XL25CM SIL CVR WTRPRF VIR BACT BARR ANTIMIC

## (undated) DEVICE — HOOD: Brand: FLYTE, SURGICOOL

## (undated) DEVICE — Z DISCONTINUED USE 2744636  DRESSING AQUACEL 14 IN ALG W3.5XL14IN POLYUR FLM CVR W/ HYDRCOLL

## (undated) DEVICE — DRAPE, SHEET, EXTREMITY, W/ARM BOARD COVERS, 87 X 106 X 128 IN, DISPOSABLE, LF, STERILE

## (undated) DEVICE — DRESSING, GAUZE, PETROLATUM, PATCH, XEROFORM, 1 X 8 IN, STERILE

## (undated) DEVICE — Z DISCONTINUED PER MEDLINE USE 2741942 DRESSING AQUACEL 6 IN ALG W9XL15CM SIL CVR WTRPRF VIR BACT BARR ANTIMIC

## (undated) DEVICE — STRAP, ARM BOARD, 32 X 1.5

## (undated) DEVICE — INTERPULSE HANDPIECE SET W/ 10FT SUCTION TUBING

## (undated) DEVICE — SIPS DUAL 2 MINUTE TIP

## (undated) DEVICE — GLOVE SURG SZ 8 L12IN FNGR THK13MIL BRN LTX SYN POLYMER W

## (undated) DEVICE — PREP, IODOPHOR, W/ALCOHOL, DURAPREP, W/APPLICATOR, 26 CC

## (undated) DEVICE — SUTURE PDS II SZ 1 L96IN ABSRB VLT XLH L70MM 1/2 CIR Z881G

## (undated) DEVICE — SYRINGE MED 50ML LUERLOCK TIP

## (undated) DEVICE — 400ML COMPACT EVACUATOR KIT, 1/8" PVC WITH TROCAR: Brand: HEMOVAC® WOUND DRAINAGE SYSTEM

## (undated) DEVICE — BANDAGE, ELASTIC, MATRIX, SELF-CLOSURE, 6 IN X 5 YD, LF

## (undated) DEVICE — WOUND VAC KIT, W/CANNISTER, 500ML, 5/PK

## (undated) DEVICE — DRESSING, MEPILEX BORDER, POST-OP AG, 4 X 10 IN

## (undated) DEVICE — GOWN, SURGICAL, ROYAL SILK, LG, STERILE

## (undated) DEVICE — BANDAGE, ESMARK, 6 IN X 9 FT, STERILE

## (undated) DEVICE — DRESSING KIT, V.A.C, W/DRAPE/TUBING, FOAM BLACK, LG

## (undated) DEVICE — SUTURE MCRYL SZ 4-0 L27IN ABSRB UD L19MM PS-2 1/2 CIR PRIM Y426H

## (undated) DEVICE — STAPLER, SKIN, PLUS, WIDE, 35

## (undated) DEVICE — SUTURE RETRIEVER

## (undated) DEVICE — GLOVE, SURGICAL, PROTEXIS PI BLUE W/NEUTHERA, 8.0, PF, LF

## (undated) DEVICE — IMMOBILIZER, KNEE, 19IN, ADJUSTABLE FOAM, W/ ELASTIC STRAPS

## (undated) DEVICE — DRAPE, INCISE, ANTIMICROBIAL, IOBAN 2, LARGE, 17 X 23 IN, DISPOSABLE, STERILE

## (undated) DEVICE — GLOVE, SURGICAL, PROTEXIS PI W/NEU-THERA, 8.0, PF, LF

## (undated) DEVICE — DRESSING KIT, VACUUM ASSISTED CLOSURE, W/DRAPE/TUBING, MEDIUM, FOAM, BLACK

## (undated) DEVICE — SUTURE, ETHIBOND XTRA, 1, 30 IN, CTX, LF

## (undated) DEVICE — DRAIN SURG 10FR 100% SIL RND END PERF W/ TRCR

## (undated) DEVICE — GLOVE SURG SZ 85 L12IN FNGR THK13MIL BRN LTX SYN POLYMER W

## (undated) DEVICE — LABEL MED MINI W/ MARKER

## (undated) DEVICE — PACK PROCEDURE SURG TOT HIP DRP

## (undated) DEVICE — MANIFOLD, 4 PORT NEPTUNE STANDARD

## (undated) DEVICE — GOWN, ASTOUND, XL

## (undated) DEVICE — SOLUTION, IRRIGATION, SODIUM CHLORIDE 0.9%, 1000 ML, POUR BOTTLE

## (undated) DEVICE — PADDING, WEBRIL, UNDERCAST, STERILE, 6 IN

## (undated) DEVICE — GLOVE ORANGE PI 8 1/2   MSG9085

## (undated) DEVICE — BATH BLANKET STERILE

## (undated) DEVICE — DRESSING, GAUZE, SPONGE, 12 PLY, 4 X 4 IN, PLASTIC POUCH, STRL 10PK

## (undated) DEVICE — WOUND VAC KIT, W/CANISTER, 500ML (5/CS)

## (undated) DEVICE — DRAPE, SHEET, U, STERI DRAPE, 47 X 51 IN, DISPOSABLE, STERILE

## (undated) DEVICE — CUFF, TOURNIQUET, DUAL PORT, SNG BLADDER, 30 IN, PLC

## (undated) DEVICE — SOLUTION, IRRIGATION, STERILE WATER, 1000 ML, POUR BOTTLE

## (undated) DEVICE — SOLUTION, SODIUM CHLORIDE 0.9%, 3000ML, BAG

## (undated) DEVICE — SUTURE, ETHILON, 3-0, 18 IN, PS2, BLACK

## (undated) DEVICE — TUBING, IRRIGATION, HIGH FLOW, HAND PIECE SET

## (undated) DEVICE — DRESSING, ABDOMINAL PAD, CURITY, 7.5 X 8 IN

## (undated) DEVICE — ELECTRODE PT RET AD L9FT HI MOIST COND ADH HYDRGEL CORDED

## (undated) DEVICE — DRAPE, SHEET, THREE QUARTER, FAN FOLD, 57 X 77 IN

## (undated) DEVICE — NEEDLE SPNL 18GA L3.5IN W/ QNCKE SHARPER BVL DURA CLICK

## (undated) DEVICE — SUTURE, VICRYL 0, TAPER POINT, CT-1 VIOLET 27 INCH

## (undated) DEVICE — SUTURE, CTD, VICRYL, 2-0, UND, BR, CT-2

## (undated) DEVICE — BLADE SAW W098XL276IN THK0025IN CUT THK0039IN REPL SAG

## (undated) DEVICE — APPLICATOR, CHLORAPREP, W/ORANGE TINT, 26ML

## (undated) DEVICE — DRAPE,HIP,W/POUCHES,STERILE: Brand: MEDLINE